# Patient Record
Sex: MALE | Race: BLACK OR AFRICAN AMERICAN | NOT HISPANIC OR LATINO | Employment: OTHER | URBAN - METROPOLITAN AREA
[De-identification: names, ages, dates, MRNs, and addresses within clinical notes are randomized per-mention and may not be internally consistent; named-entity substitution may affect disease eponyms.]

---

## 2020-10-08 ENCOUNTER — APPOINTMENT (OUTPATIENT)
Dept: GENERAL RADIOLOGY | Facility: CLINIC | Age: 65
End: 2020-10-08
Attending: EMERGENCY MEDICINE
Payer: COMMERCIAL

## 2020-10-08 ENCOUNTER — HOSPITAL ENCOUNTER (EMERGENCY)
Facility: CLINIC | Age: 65
Discharge: HOME | End: 2020-10-08
Attending: EMERGENCY MEDICINE | Admitting: EMERGENCY MEDICINE
Payer: COMMERCIAL

## 2020-10-08 VITALS
HEART RATE: 57 BPM | DIASTOLIC BLOOD PRESSURE: 96 MMHG | TEMPERATURE: 98.1 F | RESPIRATION RATE: 16 BRPM | SYSTOLIC BLOOD PRESSURE: 135 MMHG | OXYGEN SATURATION: 96 %

## 2020-10-08 DIAGNOSIS — R07.89 OTHER CHEST PAIN: ICD-10-CM

## 2020-10-08 DIAGNOSIS — M54.12 CERVICAL RADICULOPATHY: ICD-10-CM

## 2020-10-08 LAB
ALBUMIN SERPL-MCNC: 4.1 G/DL (ref 3.4–5)
ALBUMIN UR-MCNC: NEGATIVE MG/DL
ALP SERPL-CCNC: 91 U/L (ref 40–150)
ALT SERPL W P-5'-P-CCNC: 32 U/L (ref 0–70)
ANION GAP SERPL CALCULATED.3IONS-SCNC: <1 MMOL/L (ref 3–14)
APPEARANCE UR: CLEAR
AST SERPL W P-5'-P-CCNC: 25 U/L (ref 0–45)
BASOPHILS # BLD AUTO: 0 10E9/L (ref 0–0.2)
BASOPHILS NFR BLD AUTO: 0.5 %
BILIRUB SERPL-MCNC: 0.5 MG/DL (ref 0.2–1.3)
BILIRUB UR QL STRIP: NEGATIVE
BUN SERPL-MCNC: 23 MG/DL (ref 7–30)
CALCIUM SERPL-MCNC: 9 MG/DL (ref 8.5–10.1)
CHLORIDE SERPL-SCNC: 107 MMOL/L (ref 94–109)
CO2 SERPL-SCNC: 32 MMOL/L (ref 20–32)
COLOR UR AUTO: YELLOW
CREAT SERPL-MCNC: 1.14 MG/DL (ref 0.66–1.25)
D DIMER PPP FEU-MCNC: 0.4 UG/ML FEU (ref 0–0.5)
DIFFERENTIAL METHOD BLD: NORMAL
EOSINOPHIL # BLD AUTO: 0.2 10E9/L (ref 0–0.7)
EOSINOPHIL NFR BLD AUTO: 2.2 %
ERYTHROCYTE [DISTWIDTH] IN BLOOD BY AUTOMATED COUNT: 14.4 % (ref 10–15)
GFR SERPL CREATININE-BSD FRML MDRD: 68 ML/MIN/{1.73_M2}
GLUCOSE SERPL-MCNC: 98 MG/DL (ref 70–99)
GLUCOSE UR STRIP-MCNC: NEGATIVE MG/DL
HCT VFR BLD AUTO: 50.1 % (ref 40–53)
HGB BLD-MCNC: 16.1 G/DL (ref 13.3–17.7)
HGB UR QL STRIP: NEGATIVE
IMM GRANULOCYTES # BLD: 0 10E9/L (ref 0–0.4)
IMM GRANULOCYTES NFR BLD: 0.2 %
INTERPRETATION ECG - MUSE: NORMAL
KETONES UR STRIP-MCNC: NEGATIVE MG/DL
LEUKOCYTE ESTERASE UR QL STRIP: NEGATIVE
LYMPHOCYTES # BLD AUTO: 2.5 10E9/L (ref 0.8–5.3)
LYMPHOCYTES NFR BLD AUTO: 29.9 %
MCH RBC QN AUTO: 27.6 PG (ref 26.5–33)
MCHC RBC AUTO-ENTMCNC: 32.1 G/DL (ref 31.5–36.5)
MCV RBC AUTO: 86 FL (ref 78–100)
MONOCYTES # BLD AUTO: 0.6 10E9/L (ref 0–1.3)
MONOCYTES NFR BLD AUTO: 7.3 %
NEUTROPHILS # BLD AUTO: 5 10E9/L (ref 1.6–8.3)
NEUTROPHILS NFR BLD AUTO: 59.9 %
NITRATE UR QL: NEGATIVE
NRBC # BLD AUTO: 0 10*3/UL
NRBC BLD AUTO-RTO: 0 /100
PH UR STRIP: 6.5 PH (ref 5–7)
PLATELET # BLD AUTO: 181 10E9/L (ref 150–450)
POTASSIUM SERPL-SCNC: 3.4 MMOL/L (ref 3.4–5.3)
PROT SERPL-MCNC: 7.7 G/DL (ref 6.8–8.8)
RBC # BLD AUTO: 5.84 10E12/L (ref 4.4–5.9)
SODIUM SERPL-SCNC: 139 MMOL/L (ref 133–144)
SOURCE: ABNORMAL
SP GR UR STRIP: 1.03 (ref 1–1.03)
TROPONIN I BLD-MCNC: 0.01 UG/L (ref 0–0.08)
UROBILINOGEN UR STRIP-MCNC: 4 MG/DL (ref 0–2)
WBC # BLD AUTO: 8.4 10E9/L (ref 4–11)

## 2020-10-08 PROCEDURE — 85025 COMPLETE CBC W/AUTO DIFF WBC: CPT | Performed by: EMERGENCY MEDICINE

## 2020-10-08 PROCEDURE — 81003 URINALYSIS AUTO W/O SCOPE: CPT | Performed by: EMERGENCY MEDICINE

## 2020-10-08 PROCEDURE — 80053 COMPREHEN METABOLIC PANEL: CPT | Performed by: EMERGENCY MEDICINE

## 2020-10-08 PROCEDURE — 99285 EMERGENCY DEPT VISIT HI MDM: CPT | Mod: 25 | Performed by: EMERGENCY MEDICINE

## 2020-10-08 PROCEDURE — 93010 ELECTROCARDIOGRAM REPORT: CPT | Performed by: EMERGENCY MEDICINE

## 2020-10-08 PROCEDURE — 85379 FIBRIN DEGRADATION QUANT: CPT | Performed by: EMERGENCY MEDICINE

## 2020-10-08 PROCEDURE — 71046 X-RAY EXAM CHEST 2 VIEWS: CPT

## 2020-10-08 PROCEDURE — 93005 ELECTROCARDIOGRAM TRACING: CPT | Performed by: EMERGENCY MEDICINE

## 2020-10-08 PROCEDURE — 99284 EMERGENCY DEPT VISIT MOD MDM: CPT | Mod: 25 | Performed by: EMERGENCY MEDICINE

## 2020-10-08 PROCEDURE — 84484 ASSAY OF TROPONIN QUANT: CPT

## 2020-10-08 RX ORDER — ATORVASTATIN CALCIUM 20 MG/1
20 TABLET, FILM COATED ORAL DAILY
COMMUNITY
End: 2022-12-06

## 2020-10-08 RX ORDER — METHYLPREDNISOLONE 4 MG
TABLET, DOSE PACK ORAL
Qty: 21 TABLET | Refills: 0 | Status: SHIPPED | OUTPATIENT
Start: 2020-10-08 | End: 2022-08-01

## 2020-10-08 RX ORDER — ASPIRIN 81 MG/1
81 TABLET ORAL PRN
Status: ON HOLD | COMMUNITY
End: 2023-03-08

## 2020-10-08 RX ORDER — NAPROXEN 25 MG/ML
SUSPENSION ORAL 2 TIMES DAILY
Status: ON HOLD | COMMUNITY
End: 2023-03-09

## 2020-10-08 ASSESSMENT — ENCOUNTER SYMPTOMS
SHORTNESS OF BREATH: 0
COLOR CHANGE: 0
NECK STIFFNESS: 0
DIFFICULTY URINATING: 0
ARTHRALGIAS: 0
CONFUSION: 0
HEADACHES: 0
ABDOMINAL PAIN: 0
EYE REDNESS: 0
FEVER: 0

## 2020-10-08 NOTE — ED TRIAGE NOTES
"Patient reports that he has been having pain in the right arm for over a month. Patient reports that he has been trying to get an \"online apt\" with no luck. The pain was finally good enough today so that he could make it in today. Patient reports taking advil daily to help with the pain.  "

## 2020-10-08 NOTE — ED AVS SNAPSHOT
[facility]  [address]  [place] [address]  Phone: [phone]  Fax: [phone]                                    Edin Robles   MRN: [id number]    Department: [facility]   Date of Visit: 10/8/2020           After Visit Summary Signature Page    I have received my discharge instructions, and my questions have been answered. I have discussed any challenges I see with this plan with the nurse or doctor.    ..........................................................................................................................................  Patient/Patient Representative Signature      ..........................................................................................................................................  Patient Representative Print Name and Relationship to Patient    ..................................................               ................................................  Date                                   Time    ..........................................................................................................................................  Reviewed by Signature/Title    ...................................................              ..............................................  Date                                               Time          22EPIC Rev 08/18

## 2022-08-01 ENCOUNTER — OFFICE VISIT (OUTPATIENT)
Dept: FAMILY MEDICINE | Facility: CLINIC | Age: 67
End: 2022-08-01
Payer: COMMERCIAL

## 2022-08-01 VITALS
HEIGHT: 72 IN | RESPIRATION RATE: 20 BRPM | HEART RATE: 65 BPM | OXYGEN SATURATION: 96 % | TEMPERATURE: 97.3 F | WEIGHT: 204.1 LBS | SYSTOLIC BLOOD PRESSURE: 106 MMHG | BODY MASS INDEX: 27.64 KG/M2 | DIASTOLIC BLOOD PRESSURE: 73 MMHG

## 2022-08-01 DIAGNOSIS — F33.1 MAJOR DEPRESSIVE DISORDER, RECURRENT EPISODE, MODERATE (H): ICD-10-CM

## 2022-08-01 DIAGNOSIS — E78.2 MIXED HYPERLIPIDEMIA: ICD-10-CM

## 2022-08-01 DIAGNOSIS — B35.3 TINEA PEDIS OF LEFT FOOT: ICD-10-CM

## 2022-08-01 DIAGNOSIS — I10 PRIMARY HYPERTENSION: ICD-10-CM

## 2022-08-01 DIAGNOSIS — R79.89 LOW TSH LEVEL: ICD-10-CM

## 2022-08-01 DIAGNOSIS — L73.9 ACUTE FOLLICULITIS: Primary | ICD-10-CM

## 2022-08-01 PROCEDURE — 99204 OFFICE O/P NEW MOD 45 MIN: CPT | Performed by: FAMILY MEDICINE

## 2022-08-01 RX ORDER — CITALOPRAM HYDROBROMIDE 20 MG/1
20 TABLET ORAL
COMMUNITY
End: 2022-08-18

## 2022-08-01 RX ORDER — KETOCONAZOLE 20 MG/G
CREAM TOPICAL DAILY
Qty: 30 G | Refills: 1 | Status: ON HOLD | OUTPATIENT
Start: 2022-08-01 | End: 2023-03-08

## 2022-08-01 RX ORDER — HYDROCHLOROTHIAZIDE 25 MG/1
25 TABLET ORAL
COMMUNITY
End: 2022-12-06

## 2022-08-01 RX ORDER — AMLODIPINE BESYLATE 10 MG/1
10 TABLET ORAL DAILY
COMMUNITY
Start: 2021-09-14 | End: 2022-12-06

## 2022-08-01 ASSESSMENT — ANXIETY QUESTIONNAIRES
2. NOT BEING ABLE TO STOP OR CONTROL WORRYING: SEVERAL DAYS
GAD7 TOTAL SCORE: 6
7. FEELING AFRAID AS IF SOMETHING AWFUL MIGHT HAPPEN: NOT AT ALL
1. FEELING NERVOUS, ANXIOUS, OR ON EDGE: NOT AT ALL
5. BEING SO RESTLESS THAT IT IS HARD TO SIT STILL: SEVERAL DAYS
IF YOU CHECKED OFF ANY PROBLEMS ON THIS QUESTIONNAIRE, HOW DIFFICULT HAVE THESE PROBLEMS MADE IT FOR YOU TO DO YOUR WORK, TAKE CARE OF THINGS AT HOME, OR GET ALONG WITH OTHER PEOPLE: SOMEWHAT DIFFICULT
GAD7 TOTAL SCORE: 6
6. BECOMING EASILY ANNOYED OR IRRITABLE: SEVERAL DAYS
3. WORRYING TOO MUCH ABOUT DIFFERENT THINGS: SEVERAL DAYS

## 2022-08-01 ASSESSMENT — PATIENT HEALTH QUESTIONNAIRE - PHQ9
5. POOR APPETITE OR OVEREATING: MORE THAN HALF THE DAYS
SUM OF ALL RESPONSES TO PHQ QUESTIONS 1-9: 12

## 2022-08-01 ASSESSMENT — PAIN SCALES - GENERAL: PAINLEVEL: EXTREME PAIN (9)

## 2022-08-01 NOTE — COMMUNITY RESOURCES LIST (ENGLISH)
08/01/2022   [facility] Outpatient Clinics  N/A  For questions about this resource list or additional care needs, please contact your primary care clinic or care manager.  Phone: [phone]   Email: N/A   Address: [address] [place] [address]   Hours: N/A        Hotlines and Helplines       Hotline - Crisis help  1  National Human Trafficking Hotline Distance: 2.3 miles      COVID-19 Status: Phone/Virtual   [address] [place] [address]  Language: English  Hours: Mon - Sun Open 24 Hours   Phone: (211) 533-8814 Website: https://[employer]/     2  Pregnancy and Postpartum Support [place] - Pregnancy and Postpartum Distance: 2.3 miles      COVID-19 Status: Phone/Virtual   [address] [place] [address]  Language: English  Hours: Mon - Fri 9:00 AM - 5:00 PM   Phone: (629) 369-7997 Email: winston@"[employer].com Website: http://www.[facility] Center[facility]mn.org          Mental Health       Individual counseling  3  [facility] Distance: 0.74 miles      COVID-19 Status: Phone/Virtual   [address] [place] [address]  Language: English, Hong Konger  Hours: Mon 8:00 AM - 5:00 PM , Tue 8:00 AM - 7:00 PM , Wed - Fri 8:00 AM - 5:00 PM  Fees: Insurance, Self Pay, Sliding Fee   Phone: (143) 716-6180 Email: [email or web address] Website: https://www.[facility].org/index.php/[facility] [place] [facility]     4  [facility] Distance: 0.99 miles      COVID-19 Status: Regular Operations   [address] [place] [address]  Language: English  Hours: Mon - Thu 8:00 AM - 5:00 PM , Fri 8:00 AM - 4:00 PM  Fees: Insurance, Self Pay, Sliding Fee   Phone: (502) 443-5413 Email: info@[employer] Website: http://www.[employer]/index.php     Mental health crisis care  5  [place] - Children's Crisis Response Services Distance: 2.53 miles      COVID-19 Status: Regular Operations,  COVID-19 Status: Phone/Virtual   [address] [place] [address]  Language: English, Hmong, Burmese, Lithuanian  Hours: Mon - Sun Open 24 Hours  Fees: Free, Insurance   Phone: (467) 274-3385 Website: http://www.[facility]/health-medical/childrens-mental-health-services#child-crisis-services     6  Community Outreach for Psychiatric Emergencies (COPE) Distance: 2.53 miles      COVID-19 Status: Regular Operations, COVID-19 Status: Phone/Virtual   [address] [place] [address]  Language: English, Burmese, Lithuanian  Hours: Mon - Sun Open 24 Hours  Fees: Free   Phone: (617) 425-4566 Email: [facility].[email or web address] Website: http://www.[facility]/emergencies/mental-health-emergencies     Mental health support group  7  [facility] Distance: 1.84 miles      COVID-19 Status: Regular Operations, COVID-19 Status: Phone/Virtual   [address] [place] [address]  Language: English, Tim, Serbian  Hours: Mon - Fri 9:00 AM - 5:00 PM  Fees: Free   Phone: (937) 928-7594 Email: mecca@[employer] Website: https://www.[employer]/     8  Pregnancy & Postpartum Support [facility] Distance: 2.3 miles      COVID-19 Status: Regular Operations   [address] [place] [address]  Language: English  Hours: Tue 7:30 PM - 9:30 PM  Fees: Free   Phone: (619) 341-7625 Email: kristy@[employer] Website: http://www.[facility] Hospital[facility]ortmn.org/multiples          Important Numbers & Websites       Emergency Services   911  City Services   311  Poison Control   (355) 596-6133  Suicide Prevention Lifeline   (305) 712-5488 (TALK)  Child Abuse Hotline   (989) 334-7724 (4-A-Child)  Sexual Assault Hotline   (390) 201-1835 (HOPE)  National Runaway Safeline   (714) 111-9267 (RUNAWAY)  All-Options Talkline   (696) 333-9542  Substance Abuse Referral   (737) 485-9807 (HELP)

## 2022-08-01 NOTE — PROGRESS NOTES
{PROVIDER CHARTING PREFERENCE:640946}    Subjective   Edin is a 66 year old{ACCOMPANIED BY STATEMENT (Optional):789982}, presenting for the following health issues:  Establish Care      History of Present Illness       Reason for visit:  Lump under throat, chronic fatigue, back pain  Symptom onset:  1-2 weeks ago  Symptom intensity:  Moderate  Symptom progression:  Worsening  Had these symptoms before:  No  What makes it worse:  Heat  What makes it better:  Cool temps    He eats 2-3 servings of fruits and vegetables daily.He consumes 2 sweetened beverage(s) daily.He exercises with enough effort to increase his heart rate 60 or more minutes per day.  He exercises with enough effort to increase his heart rate 7 days per week.   He is taking medications regularly.       {SUPERLIST (Optional):239617}  {additonal problems for provider to add (Optional):054208}    Review of Systems   {ROS COMP (Optional):435069}      Objective    /73   Pulse 65   Temp 97.3  F (36.3  C) (Tympanic)   Resp 20   Ht 1.829 m (6')   Wt 92.6 kg (204 lb 1.6 oz)   SpO2 96%   BMI 27.68 kg/m    Body mass index is 27.68 kg/m .  Physical Exam   {Exam List (Optional):244998}    {Diagnostic Test Results (Optional):652341}    {AMBULATORY ATTESTATION (Optional):496633}            .  ..

## 2022-08-01 NOTE — PATIENT INSTRUCTIONS
For the lump under chin - use warm compresses twice a day to three times a day   Begin Augmentin 875 mg twice a day for 10 days.    For the shoulder/back pain symptoms. I will review the [facility] chart regarding the previous evaluation and the area that you need recheck of the growth in lung.      Begin nizoral cream to the left toe area for fungal infection.  If not effective, notify me.

## 2022-08-01 NOTE — PROGRESS NOTES
Assessment & Plan     Acute folliculitis  Submandibular area - not abscess like at this time.  Warm compresses, antibiotics.  Consider I&D if worsening.  Begin antibiotics as noted.  Follow up if fails to resolve.    - amoxicillin-clavulanate (AUGMENTIN) 875-125 MG tablet; Take 1 tablet by mouth 2 times daily    Tinea pedis of left foot  Topical treatment for athletes foot as noted.    - ketoconazole (NIZORAL) 2 % external cream; Apply topically daily    Major depressive disorder, recurrent episode, moderate (H)  Taking citalopram as previous.      Mixed hyperlipidemia  Taking lipitor as previous.   Labs due.  Orders placed for future labs.    - Lipid panel reflex to direct LDL Non-fasting; Future    Primary hypertension  BP well controlled with combo of amlodipine and hydrochlorothiazide.  Labs planned.    - Comprehensive metabolic panel (BMP + Alb, Alk Phos, ALT, AST, Total. Bili, TP); Future  - Albumin Random Urine Quantitative with Creat Ratio; Future    Low TSH level  On previous testing in October 2020.  Follow up planned   - TSH with free T4 reflex; Future    Review of prior external note(s) from - CareEverywhere information from [facility] [employer]  reviewed  Review of the result(s) of each unique test - TSH, Lipid, CMP,   Ordering of each unique test  Prescription drug management         BMI:   Estimated body mass index is 27.68 kg/m  as calculated from the following:    Height as of this encounter: 1.829 m (6').    Weight as of this encounter: 92.6 kg (204 lb 1.6 oz).   Weight management plan: Discussed healthy diet and exercise guidelines    Depression Screening Follow Up    PHQ 8/1/2022   PHQ-9 Total Score 12   Q9: Thoughts of better off dead/self-harm past 2 weeks Not at all         Follow Up Actions Taken  Crisis resource information provided in After Visit Summary     Patient Instructions   For the lump under chin - use warm compresses twice a day to three times a day   Begin Augmentin 875 mg twice a  day for 10 days.    For the shoulder/back pain symptoms. I will review the [facility] chart regarding the previous evaluation and the area that you need recheck of the growth in lung.      Begin nizoral cream to the left toe area for fungal infection.  If not effective, notify me.            Return in about 3 months (around 11/1/2022).    Jessica Small MD  [facility] KRYSTIAN Jesus is a 66 year old presenting for the following health issues:  Establish Care      History of Present Illness       Reason for visit:  Lump under throat, chronic fatigue, back pain  Symptom onset:  1-2 weeks ago  Symptom intensity:  Moderate  Symptom progression:  Worsening  Had these symptoms before:  No  What makes it worse:  Heat  What makes it better:  Cool temps    He eats 2-3 servings of fruits and vegetables daily.He consumes 2 sweetened beverage(s) daily.He exercises with enough effort to increase his heart rate 60 or more minutes per day.  He exercises with enough effort to increase his heart rate 7 days per week.   He is taking medications regularly.     Taking herbal supplement for pain symptoms.  Aspirin, aleve, ibuprofen without benefit.  Had 3rd booster recently.    Lump under chin  - tender to touch.  No drainage.  No sore throat or diffiuclty swallowing.  No fever or systemic symptoms.    Works 12 hour days.  Back pain - Back pain below shoulder blade - cramping, stabbing pain - pinched nerve like.      Hyperhidrosis face.      Fluid intake ok         Hyperlipidemia Follow-Up      Are you regularly taking any medication or supplement to lower your cholesterol?   Yes- Lipitor    Are you having muscle aches or other side effects that you think could be caused by your cholesterol lowering medication?  No    Hypertension Follow-up      Do you check your blood pressure regularly outside of the clinic? No     Are you following a low salt diet? No    Are your blood pressures ever more than 140 on  the top number (systolic) OR more   than 90 on the bottom number (diastolic), for example 140/90? No      Depression Followup    How are you doing with your depression since your last visit? No change    Are you having other symptoms that might be associated with depression? No    Have you had a significant life event?  No     Are you feeling anxious or having panic attacks?   No    Do you have any concerns with your use of alcohol or other drugs? No    Social History     Tobacco Use     Smoking status: Former Smoker     Quit date: 2021     Years since quittin.0     Smokeless tobacco: Never Used   Vaping Use     Vaping Use: Never used   Substance Use Topics     Alcohol use: Yes     Comment: beer per day, on average     Drug use: Not Currently     PHQ 2022   PHQ-9 Total Score 12   Q9: Thoughts of better off dead/self-harm past 2 weeks Not at all     LUTHER-7 SCORE 2022   Total Score 6         Suicide Assessment Five-step Evaluation and Treatment (SAFE-T)        Review of Systems   Constitutional, HEENT, cardiovascular, pulmonary, gi and gu systems are negative, except as otherwise noted.      Objective    /73   Pulse 65   Temp 97.3  F (36.3  C) (Tympanic)   Resp 20   Ht 1.829 m (6')   Wt 92.6 kg (204 lb 1.6 oz)   SpO2 96%   BMI 27.68 kg/m    Body mass index is 27.68 kg/m .  Physical Exam   GENERAL: alert and no distress  HENT: normal cephalic/atraumatic, ear canals and TM's normal, nose and mouth without ulcers or lesions, oropharynx clear and oral mucous membranes moist.  Submandibular area  in beard with 3 cm raised erythematous indurated area.  No fluctuance, no drainage or open areas noted.  No warmth touch.  Mildly tender.    NECK: no adenopathy, no asymmetry, masses, or scars and thyroid normal to palpation  RESP: lungs clear to auscultation - no rales, rhonchi or wheezes  CV: regular rate and rhythm, normal S1 S2, no S3 or S4, no murmur, click or rub, no peripheral edema and  peripheral pulses strong  MS: tenderness to palpation along the medial aspect of the right scapula.  FROM of BUE.    SKIN:   Left foot between the 4th and 5th toes with macerated area.  Mild erythema noted at site wtihout extension to surrounding tissues.    NEURO: Normal strength and tone, mentation intact and speech normal  PSYCH: mentation appears normal, affect normal/bright                    .  ..

## 2022-08-02 PROBLEM — F33.1 MAJOR DEPRESSIVE DISORDER, RECURRENT EPISODE, MODERATE (H): Status: ACTIVE | Noted: 2022-08-02

## 2022-08-07 ENCOUNTER — TELEPHONE (OUTPATIENT)
Dept: FAMILY MEDICINE | Facility: CLINIC | Age: 67
End: 2022-08-07

## 2022-08-08 NOTE — TELEPHONE ENCOUNTER
Called home phone-unable to LVM. Called pts cell phone and LVM. Let him know that Dr. Small was able to get records from [facility] and that she is requesting he make a f/u visit in the next 2 weeks to discuss.

## 2022-08-18 ENCOUNTER — TELEPHONE (OUTPATIENT)
Dept: FAMILY MEDICINE | Facility: CLINIC | Age: 67
End: 2022-08-18

## 2022-08-18 ENCOUNTER — VIRTUAL VISIT (OUTPATIENT)
Dept: FAMILY MEDICINE | Facility: CLINIC | Age: 67
End: 2022-08-18
Payer: COMMERCIAL

## 2022-08-18 DIAGNOSIS — E78.5 HYPERLIPIDEMIA, UNSPECIFIED HYPERLIPIDEMIA TYPE: ICD-10-CM

## 2022-08-18 DIAGNOSIS — G89.29 CHRONIC THORACIC BACK PAIN, UNSPECIFIED BACK PAIN LATERALITY: Primary | ICD-10-CM

## 2022-08-18 DIAGNOSIS — M54.6 CHRONIC THORACIC BACK PAIN, UNSPECIFIED BACK PAIN LATERALITY: Primary | ICD-10-CM

## 2022-08-18 DIAGNOSIS — F33.1 MAJOR DEPRESSIVE DISORDER, RECURRENT EPISODE, MODERATE (H): ICD-10-CM

## 2022-08-18 DIAGNOSIS — R79.89 LOW TSH LEVEL: ICD-10-CM

## 2022-08-18 DIAGNOSIS — I10 PRIMARY HYPERTENSION: ICD-10-CM

## 2022-08-18 DIAGNOSIS — L73.9 ACUTE FOLLICULITIS: ICD-10-CM

## 2022-08-18 PROCEDURE — 99214 OFFICE O/P EST MOD 30 MIN: CPT | Mod: 95 | Performed by: FAMILY MEDICINE

## 2022-08-18 RX ORDER — LIDOCAINE 50 MG/G
1 PATCH TOPICAL EVERY 24 HOURS
Qty: 30 PATCH | Refills: 0 | Status: ON HOLD | OUTPATIENT
Start: 2022-08-18 | End: 2023-03-08

## 2022-08-18 RX ORDER — CITALOPRAM HYDROBROMIDE 20 MG/1
20 TABLET ORAL DAILY
Qty: 30 TABLET | Refills: 3 | Status: ON HOLD | OUTPATIENT
Start: 2022-08-18 | End: 2023-03-08

## 2022-08-18 NOTE — PROGRESS NOTES
Edin is a 66 year old who is being evaluated via a billable video visit.      How would you like to obtain your AVS? MyChart  If the video visit is dropped, the invitation should be resent by: Text to cell phone: [phone]  Will anyone else be joining your video visit? No        Assessment & Plan     Chronic thoracic back pain, unspecified back pain laterality  Ongoing upper back pain.  He has used topical IcyHot or lidocaine in the past.  He is never used it preventatively before going to work and work is where his symptoms will occur.  I am sending a prescription for lidocaine patches in for him now and he should put it on in the morning prior to going to work it would be good for 12 hours and he can remove it at bedtime.  He will follow-up if this fails to control the symptoms.  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  - lidocaine (LIDODERM) 5 % patch; Place 1 patch onto the skin every 24 hours To prevent lidocaine toxicity, patient should be patch free for 12 hrs daily.    Major depressive disorder, recurrent episode, moderate (H)  Ongoing use of citalopram refills given.  - citalopram (CELEXA) 20 MG tablet; Take 1 tablet (20 mg) by mouth daily    Acute folliculitis  Complete antibiotic course and follow-up if fails to completely resolve    Primary hypertension  Continue amlodipine as previous for blood pressure control    Low TSH level  Return to clinic for lab including TSH evaluation    Hyperlipidemia, unspecified hyperlipidemia type  Return to lipid clinic for lipids and continue atorvastatin in the interim      Review of prior external note(s) from - CareEverywhere information from [facility]  reviewed  Review of the result(s) of each unique test - TSH, lipid  Prescription drug management         Patient Instructions   Return to clinic for labs.    Resume the citalopram - follow up in 4-6 weeks for recheck mood if symptoms fail to resolve.    Begin trial of  Lidocaine  patches once daily prior to work to see if that prevents the back pain symptoms.  Let me know if the medication is not covered by insurance.            Return in about 1 week (around 8/25/2022) for Lab Work.    Jessica Small MD  [facility] KRYSTIAN Jesus is a 66 year old presenting for the following health issues:    Hypertension and Lipids      History of Present Illness       Back Pain:  He presents for follow up of back pain. Patient's back pain is a chronic problem.  Location of back pain:  Left upper back  Description of back pain: sharp  Back pain spreads: left shoulder    Since patient first noticed back pain, pain is: always present, but gets better and worse  Does back pain interfere with his job:  Yes      He eats 2-3 servings of fruits and vegetables daily.He consumes 2 sweetened beverage(s) daily.He exercises with enough effort to increase his heart rate 60 or more minutes per day.  He exercises with enough effort to increase his heart rate 3 or less days per week.   He is taking medications regularly.   thoracic back pain:  Severe pain can happen at times.  Icy hot can help symptoms.    Sharp pain - stabby sensation.      Hyperlipidemia Follow-Up      Are you regularly taking any medication or supplement to lower your cholesterol?   Yes- Atorvastatin    Are you having muscle aches or other side effects that you think could be caused by your cholesterol lowering medication?  No    Hypertension Follow-up      Do you check your blood pressure regularly outside of the clinic? Yes     Are you following a low salt diet? Yes    Are your blood pressures ever more than 140 on the top number (systolic) OR more   than 90 on the bottom number (diastolic), for example 140/90? No      Depression and Anxiety Follow-Up    How are you doing with your depression since your last visit? No change    How are you doing with your anxiety since your last visit?  No change    Are  you having other symptoms that might be associated with depression or anxiety? No    Have you had a significant life event? No     Do you have any concerns with your use of alcohol or other drugs? No    Social History     Tobacco Use     Smoking status: Former Smoker     Quit date: 2021     Years since quittin.0     Smokeless tobacco: Never Used   Vaping Use     Vaping Use: Never used   Substance Use Topics     Alcohol use: Yes     Comment: beer per day, on average     Drug use: Not Currently     PHQ 2022   PHQ-9 Total Score 12   Q9: Thoughts of better off dead/self-harm past 2 weeks Not at all     LUTHER-7 SCORE 2022   Total Score 6         Suicide Assessment Five-step Evaluation and Treatment (SAFE-T)      Low TSH: Review of his past records reveals a low TSH with normal free T4 in 2020.  He does have future lab orders available but has not yet completed those to reassess thyroid function.    Acute folliculitis: The submandibular area is much improved although there continues to be a small apparent cystic or follicular area that is viewable on the video.  Reports he has not yet finished his antibiotics but will in a few days.  He says it is continuing to improve and is no longer causing pain.          Review of Systems   Constitutional, HEENT, cardiovascular, pulmonary, gi and gu systems are negative, except as otherwise noted.      Objective           Vitals:  No vitals were obtained today due to virtual visit.    Physical Exam   GENERAL: alert and no distress  EYES: Eyes grossly normal to inspection.  No discharge or erythema, or obvious scleral/conjunctival abnormalities.  HENT: Normal cephalic/atraumatic.  External ears, nose and mouth without ulcers or lesions.  No nasal drainage visible.  RESP: No audible wheeze, cough, or visible cyanosis.  No visible retractions or increased work of breathing.    SKIN: Visible skin clear. No significant rash, abnormal pigmentation or lesions.  SKIN: no  suspicious lesions or rashes and apparent cystic or follicular area in the submandibular space.  No drainage is noted no significant erythema is appreciated on the video.  Is much improved compared with his previous visit.  NEURO: Cranial nerves grossly intact.  Mentation and speech appropriate for age.  PSYCH: Mentation appears normal, affect normal/bright, judgement and insight intact, normal speech and appearance well-groomed.                Video-Visit Details    Video Start Time: 3:08 PM    Type of service:  Video Visit    Video End Time:3:25 PM    Originating Location (pt. Location): Home    Distant Location (provider location):  [facility]     Platform used for Video Visit: Fyber    .  ..       This chart was documented by provider using a voice activated software called Dragon in addition to manual typing. There may be vocabulary errors or other grammatical errors due to this.

## 2022-08-18 NOTE — PATIENT INSTRUCTIONS
Return to clinic for labs.    Resume the citalopram - follow up in 4-6 weeks for recheck mood if symptoms fail to resolve.    Begin trial of Lidocaine  patches once daily prior to work to see if that prevents the back pain symptoms.  Let me know if the medication is not covered by insurance.

## 2022-08-18 NOTE — TELEPHONE ENCOUNTER
Attempted to call patient to check in prior to appointment this afternoon.     No answer. LVM at 10:43.     Amee Whittington RN  08/18/22

## 2022-08-18 NOTE — TELEPHONE ENCOUNTER
Second attempt to contact patient prior to appointment this afternoon.    No answer. LVM at 2:30.    Amee Whittington RN  08/18/22

## 2022-08-21 PROBLEM — E78.5 HYPERLIPIDEMIA, UNSPECIFIED HYPERLIPIDEMIA TYPE: Status: ACTIVE | Noted: 2022-08-21

## 2022-08-21 PROBLEM — R79.89 LOW TSH LEVEL: Status: ACTIVE | Noted: 2022-08-21

## 2022-08-21 PROBLEM — G89.29 CHRONIC THORACIC BACK PAIN, UNSPECIFIED BACK PAIN LATERALITY: Status: ACTIVE | Noted: 2022-08-21

## 2022-08-21 PROBLEM — I10 PRIMARY HYPERTENSION: Status: ACTIVE | Noted: 2022-08-21

## 2022-08-21 PROBLEM — M54.6 CHRONIC THORACIC BACK PAIN, UNSPECIFIED BACK PAIN LATERALITY: Status: ACTIVE | Noted: 2022-08-21

## 2022-08-25 ENCOUNTER — LAB (OUTPATIENT)
Dept: LAB | Facility: CLINIC | Age: 67
End: 2022-08-25
Payer: COMMERCIAL

## 2022-08-25 DIAGNOSIS — I10 PRIMARY HYPERTENSION: ICD-10-CM

## 2022-08-25 DIAGNOSIS — E78.2 MIXED HYPERLIPIDEMIA: ICD-10-CM

## 2022-08-25 DIAGNOSIS — L73.9 ACUTE FOLLICULITIS: ICD-10-CM

## 2022-08-25 DIAGNOSIS — R79.89 LOW TSH LEVEL: ICD-10-CM

## 2022-08-25 LAB
ALBUMIN SERPL-MCNC: 4 G/DL (ref 3.4–5)
ALP SERPL-CCNC: 72 U/L (ref 40–150)
ALT SERPL W P-5'-P-CCNC: 28 U/L (ref 0–70)
ANION GAP SERPL CALCULATED.3IONS-SCNC: 4 MMOL/L (ref 3–14)
AST SERPL W P-5'-P-CCNC: 21 U/L (ref 0–45)
BILIRUB SERPL-MCNC: 0.8 MG/DL (ref 0.2–1.3)
BUN SERPL-MCNC: 19 MG/DL (ref 7–30)
CALCIUM SERPL-MCNC: 9.5 MG/DL (ref 8.5–10.1)
CHLORIDE BLD-SCNC: 111 MMOL/L (ref 94–109)
CHOLEST SERPL-MCNC: 134 MG/DL
CO2 SERPL-SCNC: 27 MMOL/L (ref 20–32)
CREAT SERPL-MCNC: 1.1 MG/DL (ref 0.66–1.25)
CREAT UR-MCNC: 371 MG/DL
ERYTHROCYTE [DISTWIDTH] IN BLOOD BY AUTOMATED COUNT: 14.3 % (ref 10–15)
FASTING STATUS PATIENT QL REPORTED: YES
GFR SERPL CREATININE-BSD FRML MDRD: 75 ML/MIN/1.73M2
GLUCOSE BLD-MCNC: 101 MG/DL (ref 70–99)
HCT VFR BLD AUTO: 46.6 % (ref 40–53)
HDLC SERPL-MCNC: 66 MG/DL
HGB BLD-MCNC: 15 G/DL (ref 13.3–17.7)
LDLC SERPL CALC-MCNC: 55 MG/DL
MCH RBC QN AUTO: 28.1 PG (ref 26.5–33)
MCHC RBC AUTO-ENTMCNC: 32.2 G/DL (ref 31.5–36.5)
MCV RBC AUTO: 87 FL (ref 78–100)
MICROALBUMIN UR-MCNC: 15 MG/L
MICROALBUMIN/CREAT UR: 4.04 MG/G CR (ref 0–17)
NONHDLC SERPL-MCNC: 68 MG/DL
PLATELET # BLD AUTO: 210 10E3/UL (ref 150–450)
POTASSIUM BLD-SCNC: 3.9 MMOL/L (ref 3.4–5.3)
PROT SERPL-MCNC: 7.3 G/DL (ref 6.8–8.8)
RBC # BLD AUTO: 5.34 10E6/UL (ref 4.4–5.9)
SODIUM SERPL-SCNC: 142 MMOL/L (ref 133–144)
T4 FREE SERPL-MCNC: 1.04 NG/DL (ref 0.76–1.46)
TRIGL SERPL-MCNC: 63 MG/DL
TSH SERPL DL<=0.005 MIU/L-ACNC: 0.36 MU/L (ref 0.4–4)
WBC # BLD AUTO: 7.7 10E3/UL (ref 4–11)

## 2022-08-25 PROCEDURE — 80061 LIPID PANEL: CPT

## 2022-08-25 PROCEDURE — 84439 ASSAY OF FREE THYROXINE: CPT

## 2022-08-25 PROCEDURE — 82043 UR ALBUMIN QUANTITATIVE: CPT

## 2022-08-25 PROCEDURE — 85027 COMPLETE CBC AUTOMATED: CPT

## 2022-08-25 PROCEDURE — 36415 COLL VENOUS BLD VENIPUNCTURE: CPT

## 2022-08-25 PROCEDURE — 80053 COMPREHEN METABOLIC PANEL: CPT

## 2022-08-25 PROCEDURE — 84443 ASSAY THYROID STIM HORMONE: CPT

## 2022-08-28 NOTE — RESULT ENCOUNTER NOTE
"Your urine testing is normal.  There is not elevated protein present.  The TSH is borderline low which could indicate you have too much thyroid hormone but the active thyroid hormone is normal.  This is closer to normal than previous testing.   Monitoring of this is recommended yearly.  Your cholesterol levels are well controlled on the current Lipitor doses.  Your blood sugar is borderline elevated.  This is in the \"prediabetic\" range.  Exercise and limiting carbohydrate and sugars in diet can be helpful at preventing progression to diabetes.  Your liver and kidney testing are normal.  Your blood cell counts are normal.  Please call or MyChart message me if you have any questions.      PSK   "

## 2022-09-01 DIAGNOSIS — G89.29 CHRONIC THORACIC BACK PAIN, UNSPECIFIED BACK PAIN LATERALITY: ICD-10-CM

## 2022-09-01 DIAGNOSIS — M54.6 CHRONIC THORACIC BACK PAIN, UNSPECIFIED BACK PAIN LATERALITY: ICD-10-CM

## 2022-09-10 DIAGNOSIS — F33.1 MAJOR DEPRESSIVE DISORDER, RECURRENT EPISODE, MODERATE (H): ICD-10-CM

## 2022-09-12 RX ORDER — CITALOPRAM HYDROBROMIDE 20 MG/1
TABLET ORAL
Qty: 30 TABLET | Refills: 3 | OUTPATIENT
Start: 2022-09-12

## 2022-10-22 ENCOUNTER — HEALTH MAINTENANCE LETTER (OUTPATIENT)
Age: 67
End: 2022-10-22

## 2022-12-05 ENCOUNTER — OFFICE VISIT (OUTPATIENT)
Dept: FAMILY MEDICINE | Facility: CLINIC | Age: 67
End: 2022-12-05
Payer: COMMERCIAL

## 2022-12-05 VITALS
TEMPERATURE: 97.9 F | HEART RATE: 71 BPM | HEIGHT: 72 IN | DIASTOLIC BLOOD PRESSURE: 81 MMHG | RESPIRATION RATE: 18 BRPM | SYSTOLIC BLOOD PRESSURE: 114 MMHG | BODY MASS INDEX: 29.04 KG/M2 | OXYGEN SATURATION: 97 % | WEIGHT: 214.4 LBS

## 2022-12-05 DIAGNOSIS — F33.1 MAJOR DEPRESSIVE DISORDER, RECURRENT EPISODE, MODERATE (H): ICD-10-CM

## 2022-12-05 DIAGNOSIS — E78.5 HYPERLIPIDEMIA, UNSPECIFIED HYPERLIPIDEMIA TYPE: ICD-10-CM

## 2022-12-05 DIAGNOSIS — F14.11 COCAINE ABUSE IN REMISSION (H): ICD-10-CM

## 2022-12-05 DIAGNOSIS — Z12.11 SCREEN FOR COLON CANCER: ICD-10-CM

## 2022-12-05 DIAGNOSIS — Z12.5 SCREENING FOR PROSTATE CANCER: ICD-10-CM

## 2022-12-05 DIAGNOSIS — R79.89 LOW TSH LEVEL: ICD-10-CM

## 2022-12-05 DIAGNOSIS — Z23 NEED FOR PROPHYLACTIC VACCINATION AND INOCULATION AGAINST INFLUENZA: ICD-10-CM

## 2022-12-05 DIAGNOSIS — R53.82 CHRONIC FATIGUE: ICD-10-CM

## 2022-12-05 DIAGNOSIS — R31.29 MICROSCOPIC HEMATURIA: ICD-10-CM

## 2022-12-05 DIAGNOSIS — M77.11 LATERAL EPICONDYLITIS OF RIGHT ELBOW: ICD-10-CM

## 2022-12-05 DIAGNOSIS — Z23 HIGH PRIORITY FOR 2019-NCOV VACCINE: ICD-10-CM

## 2022-12-05 DIAGNOSIS — Z13.6 ENCOUNTER FOR ABDOMINAL AORTIC ANEURYSM (AAA) SCREENING: ICD-10-CM

## 2022-12-05 DIAGNOSIS — Z00.00 ENCOUNTER FOR MEDICARE ANNUAL WELLNESS EXAM: Primary | ICD-10-CM

## 2022-12-05 DIAGNOSIS — R35.0 URINARY FREQUENCY: ICD-10-CM

## 2022-12-05 DIAGNOSIS — I73.00 RAYNAUD'S DISEASE WITHOUT GANGRENE: ICD-10-CM

## 2022-12-05 DIAGNOSIS — B35.3 TINEA PEDIS OF LEFT FOOT: ICD-10-CM

## 2022-12-05 DIAGNOSIS — I10 PRIMARY HYPERTENSION: ICD-10-CM

## 2022-12-05 LAB
ALBUMIN UR-MCNC: NEGATIVE MG/DL
APPEARANCE UR: CLEAR
BACTERIA #/AREA URNS HPF: ABNORMAL /HPF
BILIRUB UR QL STRIP: NEGATIVE
COLOR UR AUTO: YELLOW
ERYTHROCYTE [DISTWIDTH] IN BLOOD BY AUTOMATED COUNT: 14.5 % (ref 10–15)
FERRITIN SERPL-MCNC: 191 NG/ML (ref 26–388)
GLUCOSE UR STRIP-MCNC: NEGATIVE MG/DL
HCT VFR BLD AUTO: 46.7 % (ref 40–53)
HGB BLD-MCNC: 14.9 G/DL (ref 13.3–17.7)
HGB UR QL STRIP: ABNORMAL
KETONES UR STRIP-MCNC: NEGATIVE MG/DL
LEUKOCYTE ESTERASE UR QL STRIP: NEGATIVE
MCH RBC QN AUTO: 28.3 PG (ref 26.5–33)
MCHC RBC AUTO-ENTMCNC: 31.9 G/DL (ref 31.5–36.5)
MCV RBC AUTO: 89 FL (ref 78–100)
NITRATE UR QL: NEGATIVE
PH UR STRIP: 6.5 [PH] (ref 5–7)
PLATELET # BLD AUTO: 224 10E3/UL (ref 150–450)
PSA SERPL-MCNC: 2.35 UG/L (ref 0–4)
RBC # BLD AUTO: 5.27 10E6/UL (ref 4.4–5.9)
RBC #/AREA URNS AUTO: ABNORMAL /HPF
SP GR UR STRIP: 1.02 (ref 1–1.03)
SQUAMOUS #/AREA URNS AUTO: ABNORMAL /LPF
T4 FREE SERPL-MCNC: 0.85 NG/DL (ref 0.76–1.46)
TSH SERPL DL<=0.005 MIU/L-ACNC: 0.24 MU/L (ref 0.4–4)
UROBILINOGEN UR STRIP-ACNC: 1 E.U./DL
WBC # BLD AUTO: 8.6 10E3/UL (ref 4–11)
WBC #/AREA URNS AUTO: ABNORMAL /HPF

## 2022-12-05 PROCEDURE — 90677 PCV20 VACCINE IM: CPT | Performed by: FAMILY MEDICINE

## 2022-12-05 PROCEDURE — 90472 IMMUNIZATION ADMIN EACH ADD: CPT | Performed by: FAMILY MEDICINE

## 2022-12-05 PROCEDURE — 90471 IMMUNIZATION ADMIN: CPT | Performed by: FAMILY MEDICINE

## 2022-12-05 PROCEDURE — 90662 IIV NO PRSV INCREASED AG IM: CPT | Performed by: FAMILY MEDICINE

## 2022-12-05 PROCEDURE — 82306 VITAMIN D 25 HYDROXY: CPT | Performed by: FAMILY MEDICINE

## 2022-12-05 PROCEDURE — 99214 OFFICE O/P EST MOD 30 MIN: CPT | Mod: 25 | Performed by: FAMILY MEDICINE

## 2022-12-05 PROCEDURE — G0103 PSA SCREENING: HCPCS | Performed by: FAMILY MEDICINE

## 2022-12-05 PROCEDURE — G0402 INITIAL PREVENTIVE EXAM: HCPCS | Performed by: FAMILY MEDICINE

## 2022-12-05 PROCEDURE — 36415 COLL VENOUS BLD VENIPUNCTURE: CPT | Performed by: FAMILY MEDICINE

## 2022-12-05 PROCEDURE — 81001 URINALYSIS AUTO W/SCOPE: CPT | Performed by: FAMILY MEDICINE

## 2022-12-05 PROCEDURE — 85027 COMPLETE CBC AUTOMATED: CPT | Performed by: FAMILY MEDICINE

## 2022-12-05 PROCEDURE — 82728 ASSAY OF FERRITIN: CPT | Performed by: FAMILY MEDICINE

## 2022-12-05 PROCEDURE — 84439 ASSAY OF FREE THYROXINE: CPT | Performed by: FAMILY MEDICINE

## 2022-12-05 PROCEDURE — 84443 ASSAY THYROID STIM HORMONE: CPT | Performed by: FAMILY MEDICINE

## 2022-12-05 PROCEDURE — 91313 COVID-19 VACCINE BIVALENT BOOSTER 18+ (MODERNA): CPT | Performed by: FAMILY MEDICINE

## 2022-12-05 PROCEDURE — 0134A COVID-19 VACCINE BIVALENT BOOSTER 18+ (MODERNA): CPT | Performed by: FAMILY MEDICINE

## 2022-12-05 RX ORDER — NYSTATIN 100000 [USP'U]/G
POWDER TOPICAL 2 TIMES DAILY
Qty: 60 G | Refills: 0 | Status: ON HOLD | OUTPATIENT
Start: 2022-12-05 | End: 2023-03-08

## 2022-12-05 ASSESSMENT — ENCOUNTER SYMPTOMS
EYE PAIN: 0
SORE THROAT: 0
PARESTHESIAS: 0
JOINT SWELLING: 1
ARTHRALGIAS: 1
FREQUENCY: 1
HEADACHES: 0
ABDOMINAL PAIN: 1
DIARRHEA: 0
COUGH: 0
DYSURIA: 0
SHORTNESS OF BREATH: 1
WEAKNESS: 1
MYALGIAS: 1
NAUSEA: 0
PALPITATIONS: 0
CHILLS: 1
FEVER: 0
DIZZINESS: 0
HEMATURIA: 0
HEMATOCHEZIA: 0
NERVOUS/ANXIOUS: 0
CONSTIPATION: 0
HEARTBURN: 0

## 2022-12-05 ASSESSMENT — PATIENT HEALTH QUESTIONNAIRE - PHQ9
SUM OF ALL RESPONSES TO PHQ QUESTIONS 1-9: 8
SUM OF ALL RESPONSES TO PHQ QUESTIONS 1-9: 8
10. IF YOU CHECKED OFF ANY PROBLEMS, HOW DIFFICULT HAVE THESE PROBLEMS MADE IT FOR YOU TO DO YOUR WORK, TAKE CARE OF THINGS AT HOME, OR GET ALONG WITH OTHER PEOPLE: SOMEWHAT DIFFICULT

## 2022-12-05 ASSESSMENT — PAIN SCALES - GENERAL: PAINLEVEL: NO PAIN (0)

## 2022-12-05 ASSESSMENT — ACTIVITIES OF DAILY LIVING (ADL): CURRENT_FUNCTION: NO ASSISTANCE NEEDED

## 2022-12-05 NOTE — NURSING NOTE
Prior to immunization administration, verified patients identity using patient s name and date of birth. Please see Immunization Activity for additional information.     Screening Questionnaire for Adult Immunization    Are you sick today?   No   Do you have allergies to medications, food, a vaccine component or latex?   No   Have you ever had a serious reaction after receiving a vaccination?   No   Do you have a long-term health problem with heart, lung, kidney, or metabolic disease (e.g., diabetes), asthma, a blood disorder, no spleen, complement component deficiency, a cochlear implant, or a spinal fluid leak?  Are you on long-term aspirin therapy?   No   Do you have cancer, leukemia, HIV/AIDS, or any other immune system problem?   No   Do you have a parent, brother, or sister with an immune system problem?   No   In the past 3 months, have you taken medications that affect  your immune system, such as prednisone, other steroids, or anticancer drugs; drugs for the treatment of rheumatoid arthritis, Crohn s disease, or psoriasis; or have you had radiation treatments?   No   Have you had a seizure, or a brain or other nervous system problem?   No   During the past year, have you received a transfusion of blood or blood    products, or been given immune (gamma) globulin or antiviral drug?   No   For women: Are you pregnant or is there a chance you could become       pregnant during the next month?   No   Have you received any vaccinations in the past 4 weeks?   No     Immunization questionnaire answers were all negative.        Per orders of Dr. Small, injection of Pnemo 20, flu and Covid moderna given by Sera Stubbs CMA. Patient instructed to remain in clinic for 15 minutes afterwards, and to report any adverse reaction to me immediately.       Screening performed by Sera Stubbs CMA on 12/5/2022 at 9:37 AM.

## 2022-12-05 NOTE — LETTER
[address] [address]  Phone: [phone]    December 5, 2022        Edin Robles  [address] [place] [id number]          To whom it may concern:    RE: Edin Robles    Patient was seen and treated today at our clinic.    Due to medical condition, patient should avoid prolonged (>10 min) exposure to cold temperatures less than 20 degrees     Please contact me for questions or concerns.      Sincerely,        Jessica Small MD

## 2022-12-05 NOTE — PATIENT INSTRUCTIONS
Labs today.    I will update prescriptions when results return.      Vaccines:  flu, COVID booster and pneumonia vaccine.  Shingles vaccine recommended at the pharmacy.    Ultrasound for aneurysm screening -  You can call [phone] to schedule this at the [facility] building in [place]     Referral for colonoscopy - someone will contact you for scheduling.    Trial of powder for foot recommended.  Script sent to the pharmacy.          Patient Education   Personalized Prevention Plan  You are due for the preventive services outlined below.  Your care team is available to assist you in scheduling these services.  If you have already completed any of these items, please share that information with your care team to update in your medical record.  Health Maintenance Due   Topic Date Due    Depression Action Plan  Never done    Colorectal Cancer Screening  Never done    Pneumococcal Vaccine (2 - PCV) 03/03/2015    COVID-19 Vaccine (5 - Booster for Moderna series) 08/15/2022    Flu Vaccine (1) 09/01/2022    Annual Wellness Visit  Never done    AORTIC ANEURYSM SCREENING (SYSTEM ASSIGNED)  Never done    Zoster (Shingles) Vaccine (2 of 2) 01/17/2022       Depression and Suicide in Older Adults    Nearly 2 million older Americans have some type of depression. Some of them even take their own lives. Yet depression among older adults is often ignored. Learn the warning signs. You may help spare a loved one needless pain. You may also save a life.   What is depression?  Depression is a common and serious illness that affects the way you think and feel. It is not a normal part of aging, nor is it a sign of weakness, a character flaw, or something you can snap out of. Most people with depression need treatment to get better. The most common symptom is a feeling of deep sadness. People who are depressed also may seem tired and listless. And nothing seems to give them pleasure. It s normal to grieve or be sad sometimes. But  "sadness lessens or passes with time. Depression rarely goes away or improves on its own. A person with clinical depression can't \"snap out of it.\" Other symptoms of depression are:   Sleeping more or less than normal  Eating more or less than normal  Having headaches, stomachaches, or other pains that don t go away  Feeling nervous,  empty,  or worthless  Crying a great deal  Thinking or talking about suicide or death  Loss of interest in activities previously enjoyed  Social isolation  Feeling confused or forgetful  What causes it?  The causes of depression aren t fully known. But it is thought to result from a complex blend of these factors:   Biochemistry. Certain chemicals in the brain play a role.  Genes. Depression does run in families.  Life stress. Life stresses can also trigger depression in some people. Older adults often face many stressors, such as death of friends or a spouse, health problems, and financial concerns.  Chronic conditions. This includes conditions such as diabetes, heart disease, or cancer. These can cause symptoms of depression. Medicine side effects can cause changes in thoughts and behaviors.  How you can help  Often, depressed people may not want to ask for help. When they do, they may be ignored. Or, they may receive the wrong treatment. You can help by showing parents and older friends love and support. If they seem depressed, don t lecture the person, ignore the symptoms, or discount the symptoms as a  normal  part of aging -which they are not. Get involved, listen, and show interest and support.   Help them understand that depression is a treatable illness. Tell them you can help them find the right treatment. Offer to go to their healthcare provider's appointment with them for support when the symptoms are discussed. With their approval, contact a local mental health center, social service agency, or hospital about services.   You can be an advocate for him or her at healthcare " appointments. Many older adults have chronic illnesses that can cause symptoms of depression. Medicine side effects can change thoughts and behaviors. You can help make sure that the healthcare provider looks at all of these factors. He or she should refer your family member or friend to a mental healthcare provider when needed. in some cases, untreated depression can lead to a misdiagnosis. A person may be diagnosed with a brain disorder such as dementia. If the healthcare provider does not take the issue of depression seriously, help your family member or friend to find another provider.   Don't be afraid to ask  If you think an older person you care about could be suicidal, ask,  Have you thought about suicide?  Most people will tell you the truth. If they say  yes,  they may already have a plan for how and when they will attempt it. Find out as much as you can. The more detailed the plan, and the easier it is to carry out, the more danger the person is in right now. Tell the person you are there for them and do not want them to harm him or herself. Don't wait to get help for the person. Call the person's healthcare provider, local hospital, or emergency services.   To learn more  National Suicide Prevention Lifeline (crisis hotline) 550-726-RJQK (308-086-3667)  National [place] of Mental Gpjovl374-524-1943dzz.nimh.nih.gov  National [place] on Mental Yavdtly769-010-7819zrv.kike.org  Mental Health Qnjltoa624-418-3904lmf.[facility].org  National Suicide Ygnqqlj944-VWRNCKP (724-500-2417)    Call 911  Never leave the person alone. A person who is actively suicidal needs psychiatric care right away. They will need constant supervision. Never leave the person out of sight. Call 911 or the national 24-hour suicide crisis hotline at 254-483-WUXO (149-405-6223). You can also take the person to the closest emergency room.   [employer] last reviewed this educational content on 5/1/2020 2000-2021 [employer]. All  rights reserved. This information is not intended as a substitute for professional medical care. Always follow your healthcare professional's instructions.

## 2022-12-05 NOTE — PROGRESS NOTES
"Mac    The patient's PHQ-9 score is consistent with mild depression. He was provided with information regarding depression and was advised to schedule a follow up appointment in 24 weeks to further address this issue.  Continue current citalopram  SUBJECTIVE:   Edin is a 67 year old who presents for Preventive Visit.  Patient has been advised of split billing requirements and indicates understanding: Yes  Are you in the first 12 months of your Medicare coverage?  Yes,  Visual Acuity:  Right Eye: unable to read   Left Eye: 20/30  Both Eyes: 20/30    Healthy Habits:     In general, how would you rate your overall health?  Fair    Frequency of exercise:  6-7 days/week    Duration of exercise:  Less than 15 minutes    Do you usually eat at least 4 servings of fruit and vegetables a day, include whole grains    & fiber and avoid regularly eating high fat or \"junk\" foods?  No    Taking medications regularly:  Yes    Medication side effects:  None    Ability to successfully perform activities of daily living:  No assistance needed    Home Safety:  Lighting is poor    Hearing Impairment:  No hearing concerns    In the past 6 months, have you been bothered by leaking of urine? Yes    In general, how would you rate your overall mental or emotional health?  Good      PHQ-2 Total Score: 2    Additional concerns today:  Yes      Have you ever done Advance Care Planning? (For example, a Health Directive, POLST, or a discussion with a medical provider or your loved ones about your wishes): No, advance care planning information given to patient to review.  Patient plans to discuss their wishes with loved ones or provider.        Fall risk  Fallen 2 or more times in the past year?: No  Any fall with injury in the past year?: No  click delete button to remove this line now  Cognitive Screening   1) Repeat 3 items (Leader, Season, Table)    2) Clock draw: NORMAL  3) 3 item recall: Recalls 2 objects   Results: NORMAL clock, 1-2 " items recalled: COGNITIVE IMPAIRMENT LESS LIKELY    Mini-CogTM Copyright SEAMUS Prasad. Licensed by the author for use in [facility]; reprinted with permission (nadine@[facility]). All rights reserved.      Do you have sleep apnea, excessive snoring or daytime drowsiness?: yes    Reviewed and updated as needed this visit by clinical staff   Tobacco  Allergies  Meds   Med Hx  Surg Hx  Fam Hx          Reviewed and updated as needed this visit by Provider   Tobacco  Allergies  Meds   Med Hx  Surg Hx  Fam Hx         Social History     Tobacco Use     Smoking status: Former     Types: Cigarettes     Quit date: 2021     Years since quittin.3     Smokeless tobacco: Never   Substance Use Topics     Alcohol use: Yes     Comment: beer per day, on average     If you drink alcohol do you typically have >3 drinks per day or >7 drinks per week? No    Alcohol Use 2022   Prescreen: >3 drinks/day or >7 drinks/week? No   Prescreen: >3 drinks/day or >7 drinks/week? -   No flowsheet data found.        Hyperlipidemia Follow-Up    Are you regularly taking any medication or supplement to lower your cholesterol?   Yes- Lipitor  Are you having muscle aches or other side effects that you think could be caused by your cholesterol lowering medication?  No    Hypertension Follow-up    Do you check your blood pressure regularly outside of the clinic? No   Are you following a low salt diet? Yes  Are your blood pressures ever more than 140 on the top number (systolic) OR more   than 90 on the bottom number (diastolic), for example 140/90? No    Depression Followup  How are you doing with your depression since your last visit? Improved   Are you having other symptoms that might be associated with depression? No  Have you had a significant life event?  No   Are you feeling anxious or having panic attacks?   No  Do you have any concerns with your use of alcohol or other drugs? 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    Urinary frequency -denies dysuria or visible hematuria.  Denies difficulty emptying his bladder.    Raynaud's syndrome  -painfulness in his fingers when exposed to extreme cold.  After being out in 20 degree below weather for more than 10 minutes even with heated gloves he will experience extreme pain in the fingers which requires half hour to 45 minutes to resolve once he returns anxiety.  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    Tinea pedis of left foot -ongoing itchy irritation at the base of the fifth toe.  Has tried the ketoconazole cream that was given without improvement.    Cocaine abuse in remission (H) -discussed past history regarding this reports it is no longer an issue he stopped using this many years ago and has not had any recurrence.        Current providers sharing in care for this patient include:   Patient Care Team:  No Ref-Primary, Physician as PCP - Jessica Ramos MD as Assigned PCP    The following health maintenance items are reviewed in Epic and correct as of today:  Health Maintenance   Topic Date Due     DEPRESSION ACTION PLAN  Never done     COLORECTAL CANCER SCREENING  Never done      MEDICARE ANNUAL WELLNESS VISIT  Never done     AORTIC ANEURYSM SCREENING (SYSTEM ASSIGNED)  Never done     ZOSTER IMMUNIZATION (2 of 2) 01/17/2022     PHQ-9  06/05/2023     ANNUAL REVIEW OF HM ORDERS  08/18/2023     FALL RISK ASSESSMENT  12/05/2023     LIPID  08/25/2027     ADVANCE CARE PLANNING  12/05/2027     DTAP/TDAP/TD IMMUNIZATION (4 - Td or Tdap) 02/01/2032     HEPATITIS C SCREENING  Completed     HIV SCREENING  Completed     INFLUENZA VACCINE  Completed     Pneumococcal Vaccine: 65+ Years  Completed     COVID-19 Vaccine  Completed     IPV IMMUNIZATION  Aged Out     MENINGITIS IMMUNIZATION  Aged Out     LUNG CANCER SCREENING  Discontinued     BP Readings from Last 3 Encounters:   12/05/22 114/81   08/01/22 106/73   10/08/20 (!) 135/96    Wt Readings from Last 3 Encounters:   12/05/22 97.3 kg (214 lb 6.4 oz)   08/01/22 92.6 kg (204 lb 1.6 oz)             Pneumonia Vaccine:P20 today        Review of Systems   Constitutional: Positive for chills. Negative for fever.   HENT: Negative for congestion, ear pain, hearing loss and sore throat.    Eyes: Negative for pain and visual disturbance.   Respiratory: Positive for shortness of breath. Negative for cough.    Cardiovascular: Negative for chest pain, palpitations and peripheral edema.   Gastrointestinal: Positive for abdominal pain. Negative for constipation, diarrhea, heartburn, hematochezia and nausea.   Genitourinary: Positive for frequency and urgency. Negative for dysuria, genital sores, hematuria, impotence and penile discharge.   Musculoskeletal: Positive for arthralgias, joint swelling and myalgias.   Skin: Negative for rash.   Neurological: Positive for weakness. Negative for dizziness, headaches and paresthesias.   Psychiatric/Behavioral: Positive for mood changes. The patient is not nervous/anxious.    back pain. at work - walking.    Chills:  A couple weeks ago with nasal congestion, cold symptoms.    SOB with stairs only.  No swelling in ankles, no  "PND    Urinary symptoms - taking saw palmento 2 twice a day  - ? If took too many - cramping then resolved after a few hours.    Wore out, fatigued.    Back pain - arm pain with activity - using icyhot.        OBJECTIVE:   /81 (BP Location: Right arm, Patient Position: Sitting, Cuff Size: Adult Large)   Pulse 71   Temp 97.9  F (36.6  C) (Oral)   Resp 18   Ht 1.816 m (5' 11.5\")   Wt 97.3 kg (214 lb 6.4 oz)   SpO2 97%   BMI 29.49 kg/m   Estimated body mass index is 29.49 kg/m  as calculated from the following:    Height as of this encounter: 1.816 m (5' 11.5\").    Weight as of this encounter: 97.3 kg (214 lb 6.4 oz).  Physical Exam  GENERAL: healthy, alert and no distress  EYES: Eyes grossly normal to inspection, PERRL and conjunctivae and sclerae normal  HENT: ear canals and TM's normal, nose and mouth without ulcers or lesions  NECK: no adenopathy, no asymmetry, masses, or scars and thyroid normal to palpation  RESP: lungs clear to auscultation - no rales, rhonchi or wheezes  CV: regular rate and rhythm, normal S1 S2, no S3 or S4, no murmur, click or rub, no peripheral edema and peripheral pulses strong  ABDOMEN: soft, nontender, no hepatosplenomegaly, no masses and bowel sounds normal  MS: Full range of motion all extremities.  Tenderness to palpation over the right lateral epicondyle     SKIN: no suspicious lesions or rashes and plantar surface left foot at the base of the fifth toe is noted to have mild maceration and whitened appearance.  This is tender to maneuver the toe but no erythema or purulent drainage noted.  NEURO: Normal strength and tone, mentation intact and speech normal  PSYCH: mentation appears normal, affect normal/bright    Diagnostic Test Results:  Labs reviewed in Epic  Results for orders placed or performed in visit on 12/05/22   TSH with free T4 reflex     Status: Abnormal   Result Value Ref Range    TSH 0.24 (L) 0.40 - 4.00 mU/L   CBC with platelets     Status: Normal   Result " Value Ref Range    WBC Count 8.6 4.0 - 11.0 10e3/uL    RBC Count 5.27 4.40 - 5.90 10e6/uL    Hemoglobin 14.9 13.3 - 17.7 g/dL    Hematocrit 46.7 40.0 - 53.0 %    MCV 89 78 - 100 fL    MCH 28.3 26.5 - 33.0 pg    MCHC 31.9 31.5 - 36.5 g/dL    RDW 14.5 10.0 - 15.0 %    Platelet Count 224 150 - 450 10e3/uL   Ferritin     Status: Normal   Result Value Ref Range    Ferritin 191 26 - 388 ng/mL   PSA, screen     Status: Normal   Result Value Ref Range    Prostate Specific Antigen Screen 2.35 0.00 - 4.00 ug/L   UA Macro with Reflex to Micro and Culture - lab collect     Status: Abnormal    Specimen: Urine, Midstream   Result Value Ref Range    Color Urine Yellow Colorless, Straw, Light Yellow, Yellow    Appearance Urine Clear Clear    Glucose Urine Negative Negative mg/dL    Bilirubin Urine Negative Negative    Ketones Urine Negative Negative mg/dL    Specific Gravity Urine 1.025 1.003 - 1.035    Blood Urine Trace (A) Negative    pH Urine 6.5 5.0 - 7.0    Protein Albumin Urine Negative Negative mg/dL    Urobilinogen Urine 1.0 0.2, 1.0 E.U./dL    Nitrite Urine Negative Negative    Leukocyte Esterase Urine Negative Negative   Urine Microscopic     Status: Abnormal   Result Value Ref Range    Bacteria Urine Few (A) None Seen /HPF    RBC Urine 5-10 (A) 0-2 /HPF /HPF    WBC Urine 0-5 0-5 /HPF /HPF    Squamous Epithelials Urine None Seen None Seen /LPF    Narrative    Urine Culture not indicated   T4 free     Status: Normal   Result Value Ref Range    Free T4 0.85 0.76 - 1.46 ng/dL       ASSESSMENT / PLAN:   (Z00.00) Encounter for Medicare annual wellness exam  (primary encounter diagnosis)  Comment:   Plan: Screening and preventative care discussed    (R53.82) Chronic fatigue  Comment: Laboratory evaluation to assess for symptoms of fatigue  Plan: Vitamin D Deficiency, CBC with platelets,         Ferritin            (F33.1) Major depressive disorder, recurrent episode, moderate (H)  Comment: Improvement in mood noted  Plan:  Continue current citalopram    (I10) Primary hypertension  Comment: Blood pressure well controlled  Plan: Refills for amlodipine and hydrochlorothiazide sent to pharmacy.    (I73.00) Raynaud's disease without gangrene  Comment: Environmental modifications to avoid prolonged cold exposure.  A letter was given for work to assist with this hopefully.  Plan: Continue amlodipine and activity adjustments as noted above.    (E78.5) Hyperlipidemia, unspecified hyperlipidemia type  Comment: Good control of cholesterol and recent testing  Plan: Updated prescription for Lipitor sent to the pharmacy    (M77.11) Lateral epicondylitis of right elbow  Comment: Repetitive motion of the arm resulting in a tendinitis at the elbow.  Plan: Wrist/Arm/Hand Supplies Order for DME - ONLY         FOR DME        Tennis elbow band was given.  He should wear this while he is doing that activity and follow-up if symptoms are not resolved.    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)  Comment: No current or recent use  Plan: Congratulated him on his sobriety    (Z12.5) Screening for prostate cancer  Comment:   Plan: PSA, screen        Normal level    (Z13.6) Encounter for abdominal aortic aneurysm (AAA) screening  Comment: Discussed he is a candidate for evaluation for AAA  Plan: US Aorta Medicare AAA Screening        Order placed and he will call to schedule appointment    (Z12.11) Screen for colon  cancer  Comment: Due for colon cancer screening  Plan: Colonoscopy Screening  Referral        Someone will call him to set up the appointment    (Z23) High priority for 2019-nCoV vaccine  Comment:   Plan: COVID-19,PF,MODERNA BIVALENT 18+Yrs        By valent booster today    (Z23) Need for prophylactic vaccination and inoculation against influenza  Comment: Seasonal vaccine needed  Plan: INFLUENZA, QUAD, HIGH DOSE, PF, 65YR + (FLUZONE        HD)            (R31.29) Microscopic hematuria  Comment: Laboratory testing indicated microscopic hematuria.  Plan: CT Abdomen Pelvis w/o & w Contrast        Additional evaluation with CT urogram.  Probable urology referral once results return      Patient has been advised of split billing requirements and indicates understanding: Yes      COUNSELING:  Reviewed preventive health counseling, as reflected in patient instructions       Consider AAA screening for ages 65-75 and smoking history       Regular exercise       Healthy diet/nutrition       Vision screening       Hearing screening       Dental care       Bladder control       Fall risk prevention       Immunizations  Vaccinated for: Covid-19 and Influenza and Declined: Zoster due to Other   will consider in the future               Consider lung cancer screening for ages 55-80 years (77 for Medicare) and 20 pack-year smoking history        Colon cancer screening       Prostate cancer screening        He reports that he quit smoking about 16 months ago. His smoking use included cigarettes. He has never used smokeless tobacco.  And adequate nicotine exposure -2 packs/week over 58 years    Appropriate preventive services were discussed with this patient, including applicable screening as appropriate for cardiovascular disease, diabetes, osteopenia/osteoporosis, and glaucoma.  As appropriate for age/gender, discussed screening for colorectal cancer, prostate cancer, breast cancer, and cervical cancer. Checklist  reviewing preventive services available has been given to the patient.    Reviewed patients plan of care and provided an AVS. The Basic Care Plan (routine screening as documented in Health Maintenance) for Edin meets the Care Plan requirement. This Care Plan has been established and reviewed with the Patient.          Jessica Small MD  [facility]    Identified Health Risks:  Answers for HPI/ROS submitted by the patient on 12/5/2022  If you checked off any problems, how difficult have these problems made it for you to do your work, take care of things at home, or get along with other people?: Somewhat difficult  PHQ9 TOTAL SCORE: 8            Patient Instructions     Labs today.    I will update prescriptions when results return.      Vaccines:  flu, COVID booster and pneumonia vaccine.  Shingles vaccine recommended at the pharmacy.    Ultrasound for aneurysm screening -  You can call [phone] to schedule this at the [facility] building in [place]     Referral for colonoscopy - someone will contact you for scheduling.    Trial of powder for foot recommended.  Script sent to the pharmacy.          Patient Education   Personalized Prevention Plan  You are due for the preventive services outlined below.  Your care team is available to assist you in scheduling these services.  If you have already completed any of these items, please share that information with your care team to update in your medical record.  Health Maintenance Due   Topic Date Due     Depression Action Plan  Never done     Colorectal Cancer Screening  Never done     Pneumococcal Vaccine (2 - PCV) 03/03/2015     COVID-19 Vaccine (5 - Booster for Moderna series) 08/15/2022     Flu Vaccine (1) 09/01/2022     Annual Wellness Visit  Never done     AORTIC ANEURYSM SCREENING (SYSTEM ASSIGNED)  Never done     Zoster (Shingles) Vaccine (2 of 2) 01/17/2022                    This chart was documented by provider using a voice  activated software called Dragon in addition to manual typing. There may be vocabulary errors or other grammatical errors due to this.

## 2022-12-06 PROBLEM — F14.11 COCAINE ABUSE IN REMISSION (H): Status: ACTIVE | Noted: 2022-12-06

## 2022-12-06 LAB — DEPRECATED CALCIDIOL+CALCIFEROL SERPL-MC: 24 UG/L (ref 20–75)

## 2022-12-06 RX ORDER — HYDROCHLOROTHIAZIDE 25 MG/1
25 TABLET ORAL DAILY
Qty: 90 TABLET | Refills: 1 | Status: SHIPPED | OUTPATIENT
Start: 2022-12-06 | End: 2023-06-07

## 2022-12-06 RX ORDER — ATORVASTATIN CALCIUM 20 MG/1
20 TABLET, FILM COATED ORAL DAILY
Qty: 90 TABLET | Refills: 2 | Status: SHIPPED | OUTPATIENT
Start: 2022-12-06 | End: 2023-09-05

## 2022-12-06 RX ORDER — AMLODIPINE BESYLATE 10 MG/1
10 TABLET ORAL DAILY
Qty: 90 TABLET | Refills: 1 | Status: SHIPPED | OUTPATIENT
Start: 2022-12-06 | End: 2023-06-07

## 2022-12-06 NOTE — RESULT ENCOUNTER NOTE
Please call patient: See results below.    I would recommend a CT scan for evaluation of the blood in the urine that was noted on his testing.  Orders placed and he can schedule this at [place].      Your urine testing shows blood in the urine.  There is no sign of infection.  There are a variety of reasons why this can occur.  I would recommend some additional evaluation with a CT scan and potential referral to urology.  We will be in contact regarding this.  Your thyroid testing is normal with a normal active thyroid hormone (free T4).  Your blood cell counts and iron levels are normal.  Prostate cancer screening test is normal as well.  Please call or MyChart message me if you have any questions.      ESTRADA

## 2022-12-11 ENCOUNTER — TELEPHONE (OUTPATIENT)
Dept: FAMILY MEDICINE | Facility: CLINIC | Age: 67
End: 2022-12-11

## 2022-12-12 NOTE — TELEPHONE ENCOUNTER
Please call patient: See results below.     I would recommend a CT scan for evaluation of the blood in the urine that was noted on his testing.  Orders placed and he can schedule this at [place].        Your urine testing shows blood in the urine.  There is no sign of infection.  There are a variety of reasons why this can occur.  I would recommend some additional evaluation with a CT scan and potential referral to urology.  We will be in contact regarding this.  Your thyroid testing is normal with a normal active thyroid hormone (free T4).  Your blood cell counts and iron levels are normal.  Prostate cancer screening test is normal as well.  Please call or MyChart message me if you have any questions.        ESTRADA

## 2022-12-12 NOTE — RESULT ENCOUNTER NOTE
Your vitamin D level is normal but on the low side.  This is likely to worsen over the winter months with less sun exposure.   I would recommend you continue your current supplement for the summer months but an additional increase in vitamin D supplement to 1000 IU a day for the fall and winter is recommended.  This can be obtained over the counter or as a prescription.  If you desire a prescription, please notify the office and this will be sent to your pharmacy.    Please call or [employer] message me if you have any questions.      ESTRADA

## 2022-12-12 NOTE — TELEPHONE ENCOUNTER
This writer attempted to contact patient on 12/12/22    Reason for call provider's message and left message.    If patient calls back:   Relay message below, document that pt called, and close encounter.    VIRAJ Connolly  [facility]

## 2022-12-13 NOTE — TELEPHONE ENCOUNTER
RN called and LVM to call clinic at [phone].     If patient calls back please relay provider message below.    Dian CONSTANTINO, RN

## 2022-12-14 ENCOUNTER — ANCILLARY PROCEDURE (OUTPATIENT)
Dept: ULTRASOUND IMAGING | Facility: CLINIC | Age: 67
End: 2022-12-14
Attending: FAMILY MEDICINE
Payer: COMMERCIAL

## 2022-12-14 ENCOUNTER — ANCILLARY PROCEDURE (OUTPATIENT)
Dept: CT IMAGING | Facility: CLINIC | Age: 67
End: 2022-12-14
Attending: FAMILY MEDICINE
Payer: COMMERCIAL

## 2022-12-14 DIAGNOSIS — R31.29 MICROSCOPIC HEMATURIA: ICD-10-CM

## 2022-12-14 DIAGNOSIS — Z13.6 ENCOUNTER FOR ABDOMINAL AORTIC ANEURYSM (AAA) SCREENING: ICD-10-CM

## 2022-12-14 LAB
CREAT BLD-MCNC: 1.1 MG/DL (ref 0.7–1.3)
GFR SERPL CREATININE-BSD FRML MDRD: >60 ML/MIN/1.73M2

## 2022-12-14 PROCEDURE — 82565 ASSAY OF CREATININE: CPT | Performed by: PATHOLOGY

## 2022-12-14 PROCEDURE — 76706 US ABDL AORTA SCREEN AAA: CPT | Mod: GC | Performed by: RADIOLOGY

## 2022-12-14 PROCEDURE — 74178 CT ABD&PLV WO CNTR FLWD CNTR: CPT | Mod: GC | Performed by: RADIOLOGY

## 2022-12-14 RX ORDER — IOPAMIDOL 755 MG/ML
122 INJECTION, SOLUTION INTRAVASCULAR ONCE
Status: COMPLETED | OUTPATIENT
Start: 2022-12-14 | End: 2022-12-14

## 2022-12-14 RX ADMIN — IOPAMIDOL 122 ML: 755 INJECTION, SOLUTION INTRAVASCULAR at 07:55

## 2022-12-14 NOTE — TELEPHONE ENCOUNTER
RN called patient and read provider message below to him. Patient verbalized good understanding and states he completed the CT scan today 12/14/22.     No further questions or concerns at this time.     Ciara Kumar RN    [facility]

## 2022-12-18 PROBLEM — K86.2 PANCREATIC CYST: Status: ACTIVE | Noted: 2022-12-18

## 2022-12-27 ENCOUNTER — TELEPHONE (OUTPATIENT)
Dept: GASTROENTEROLOGY | Facility: CLINIC | Age: 67
End: 2022-12-27

## 2022-12-27 DIAGNOSIS — Z12.11 ENCOUNTER FOR SCREENING COLONOSCOPY: Primary | ICD-10-CM

## 2022-12-27 RX ORDER — BISACODYL 5 MG
TABLET, DELAYED RELEASE (ENTERIC COATED) ORAL
Qty: 4 TABLET | Refills: 0 | Status: ON HOLD | OUTPATIENT
Start: 2022-12-27 | End: 2023-03-08

## 2022-12-27 NOTE — TELEPHONE ENCOUNTER
Patient scheduled for colonoscopy  on 1/6/2023.     Discuss Covid policy.     Arrival time: 0800    [facility]; [address] [place] [address], [address] Floor, [place] [address]    Sedation type: MAC    Anticoagulations? No    Electronic implanted devices? No    Diabetic? No    Indication for procedure: Screening    Bowel prep recommendation: Standard Golytely     Prep instructions sent via [employer] Bowel prep script sent to [id number] - [facility], [place] - [address] [facility]    Pre visit planning completed.    Ladan Horton RN

## 2022-12-27 NOTE — TELEPHONE ENCOUNTER
Pre assessment questions completed for upcoming colonoscopy  procedure scheduled on 1/6/23    COVID policy reviewed.     Reviewed procedural arrival time 0800 and facility location [facility]; [address] [place] [address], [address] Floor, [place] [address]    Designated  policy reviewed. Instructed to have someone stay 24 hours post procedure.     Anticoagulation/blood thinners? No    Electronic implanted devices? No    Reviewed procedure prep instructions.     Patient verbalized understanding and had no questions or concerns at this time.    Ladan Horton RN

## 2022-12-27 NOTE — TELEPHONE ENCOUNTER
Screening Questions  BLUE  KIND OF PREP RED  LOCATION [review exclusion criteria] GREEN  SEDATION TYPE        Yes Are you active on mychart?       [place] Ordering/Referring Provider?        [facility] What type of coverage do you have?      N Have you had a positive covid test in the last 14 days?     27.7 1. BMI  [BMI 40+ - review exclusion criteria]    Yes  2. Are you able to give consent for your medical care? [IF NO,RN REVIEW]        N  3. Are you taking any prescription pain medications on a routine schedule?      NA  3a. EXTENDED PREP What kind of prescription?     Yes-in remission 4. Do you have any chemical dependencies such as alcohol, street drugs, or methadone?    Yes 5. Do you have any history of post-traumatic stress syndrome, severe anxiety or history of psychosis?      **If yes 3- 5 , please schedule with MAC sedation.**          IF YES TO ANY 6 - 10 - HOSPITAL SETTING ONLY.     N 6.   Do you need assistance transferring?     N 7.   Have you had a heart or lung transplant?    N 8.   Are you currently on dialysis?   N 9.   Do you use daily home oxygen?   N 10. Do you take nitroglycerin?   10a. NA If yes, how often?     11. [FEMALES]  NA Are you currently pregnant?    11a. NA If yes, how many weeks? [ Greater than 12 weeks, OR NEEDED]    N 12. Do you have Pulmonary Hypertension? *NEED PAC APPT AT [facility]*     N 13. [review exclusion criteria]  Do you have any implantable devices in your body (pacemaker, defib, LVAD)?    N 14. In the past 6 months, have you had any heart related issues including cardiomyopathy or heart attack?     14a. N If yes, did it require cardiac stenting if so when?     N 15. Have you had a stroke or Transient ischemic attack (TIA - aka  mini stroke ) within 6 months?      N 16. Do you have mod to severe Obstructive Sleep Apnea?  [Hospital only - Ok at [facility]]    N 17. Do you have SEVERE AND UNCONTROLLED asthma? *NEED PAC APPT AT [facility]*     N 18. Are you currently taking any blood  "thinners?     18a. If yes, inform patient to \"follow up w/ ordering provider for bridging instructions.\"    N 19. Do you take the medication Phentermine?    19a. If yes, \"Hold for 7 days before procedure.  Please consult your prescribing provider if you have questions about holding this medication.\"     N  20. Do you have chronic kidney disease?      N  21. Do you have a diagnosis of diabetes?     N  22. On a regular basis do you go 3-5 days between bowel movements?      23. Preferred LOCAL Pharmacy for Pre Prescription    [ LIST ONLY ONE PHARMACY]     [id number] [place] [facility] [address]        - CLOSING REMINDERS -    Informed patient they will need an adult    Cannot take any type of public or medical transportation alone    Conscious Sedation- Needs  for 6 hours after the procedure       MAC/General-Needs  for 24 hours after procedure    Pre-Procedure Covid test to be completed [[facility] PCR Testing Required]    Confirmed Nurse will call to complete assessment       - SCHEDULING DETAILS -  N Hospital Setting Required? If yes, what is the exclusion?: BRYANNA Tucker  Surgeon    1/6/23  Date of Procedure  Lower Endoscopy [Colonoscopy]  Type of Procedure Scheduled  [facility] [place] [facility]   [facility]-If you answer yes to questions #8, #20, #21Which Colonoscopy Prep was Sent?     MAC Sedation Type     NO PAC / Pre-op Required                 "

## 2023-01-05 ENCOUNTER — ANESTHESIA EVENT (OUTPATIENT)
Dept: SURGERY | Facility: AMBULATORY SURGERY CENTER | Age: 68
End: 2023-01-05
Payer: COMMERCIAL

## 2023-01-06 ENCOUNTER — ANESTHESIA (OUTPATIENT)
Dept: SURGERY | Facility: AMBULATORY SURGERY CENTER | Age: 68
End: 2023-01-06
Payer: COMMERCIAL

## 2023-01-06 ENCOUNTER — HOSPITAL ENCOUNTER (OUTPATIENT)
Facility: AMBULATORY SURGERY CENTER | Age: 68
Discharge: HOME | End: 2023-01-06
Attending: INTERNAL MEDICINE
Payer: COMMERCIAL

## 2023-01-06 VITALS
WEIGHT: 214 LBS | SYSTOLIC BLOOD PRESSURE: 128 MMHG | DIASTOLIC BLOOD PRESSURE: 94 MMHG | HEIGHT: 73 IN | OXYGEN SATURATION: 97 % | RESPIRATION RATE: 16 BRPM | TEMPERATURE: 97.4 F | BODY MASS INDEX: 28.36 KG/M2 | HEART RATE: 74 BPM

## 2023-01-06 VITALS — HEART RATE: 73 BPM

## 2023-01-06 DIAGNOSIS — C18.2 MALIGNANT NEOPLASM OF ASCENDING COLON (H): Primary | ICD-10-CM

## 2023-01-06 LAB — COLONOSCOPY: NORMAL

## 2023-01-06 PROCEDURE — 45380 COLONOSCOPY AND BIOPSY: CPT | Mod: 59,33

## 2023-01-06 PROCEDURE — 45385 COLONOSCOPY W/LESION REMOVAL: CPT | Mod: 33

## 2023-01-06 PROCEDURE — 45381 COLONOSCOPY SUBMUCOUS NJX: CPT | Mod: 33

## 2023-01-06 PROCEDURE — 88305 TISSUE EXAM BY PATHOLOGIST: CPT | Mod: TC | Performed by: INTERNAL MEDICINE

## 2023-01-06 PROCEDURE — 88305 TISSUE EXAM BY PATHOLOGIST: CPT | Mod: 26 | Performed by: PATHOLOGY

## 2023-01-06 RX ORDER — LIDOCAINE HYDROCHLORIDE 20 MG/ML
INJECTION, SOLUTION INFILTRATION; PERINEURAL PRN
Status: DISCONTINUED | OUTPATIENT
Start: 2023-01-06 | End: 2023-01-06

## 2023-01-06 RX ORDER — NALOXONE HYDROCHLORIDE 0.4 MG/ML
0.4 INJECTION, SOLUTION INTRAMUSCULAR; INTRAVENOUS; SUBCUTANEOUS
Status: DISCONTINUED | OUTPATIENT
Start: 2023-01-06 | End: 2023-01-07 | Stop reason: HOSPADM

## 2023-01-06 RX ORDER — SODIUM CHLORIDE, SODIUM LACTATE, POTASSIUM CHLORIDE, CALCIUM CHLORIDE 600; 310; 30; 20 MG/100ML; MG/100ML; MG/100ML; MG/100ML
INJECTION, SOLUTION INTRAVENOUS CONTINUOUS PRN
Status: DISCONTINUED | OUTPATIENT
Start: 2023-01-06 | End: 2023-01-06

## 2023-01-06 RX ORDER — NALOXONE HYDROCHLORIDE 0.4 MG/ML
0.2 INJECTION, SOLUTION INTRAMUSCULAR; INTRAVENOUS; SUBCUTANEOUS
Status: DISCONTINUED | OUTPATIENT
Start: 2023-01-06 | End: 2023-01-07 | Stop reason: HOSPADM

## 2023-01-06 RX ORDER — LIDOCAINE 40 MG/G
CREAM TOPICAL
Status: CANCELLED | OUTPATIENT
Start: 2023-01-06

## 2023-01-06 RX ORDER — PROCHLORPERAZINE MALEATE 5 MG
5 TABLET ORAL EVERY 6 HOURS PRN
Status: DISCONTINUED | OUTPATIENT
Start: 2023-01-06 | End: 2023-01-07 | Stop reason: HOSPADM

## 2023-01-06 RX ORDER — FLUMAZENIL 0.1 MG/ML
0.2 INJECTION, SOLUTION INTRAVENOUS
Status: ACTIVE | OUTPATIENT
Start: 2023-01-06 | End: 2023-01-06

## 2023-01-06 RX ORDER — PROPOFOL 10 MG/ML
INJECTION, EMULSION INTRAVENOUS CONTINUOUS PRN
Status: DISCONTINUED | OUTPATIENT
Start: 2023-01-06 | End: 2023-01-06

## 2023-01-06 RX ORDER — ONDANSETRON 2 MG/ML
4 INJECTION INTRAMUSCULAR; INTRAVENOUS
Status: CANCELLED | OUTPATIENT
Start: 2023-01-06

## 2023-01-06 RX ORDER — ONDANSETRON 2 MG/ML
4 INJECTION INTRAMUSCULAR; INTRAVENOUS EVERY 6 HOURS PRN
Status: DISCONTINUED | OUTPATIENT
Start: 2023-01-06 | End: 2023-01-07 | Stop reason: HOSPADM

## 2023-01-06 RX ORDER — PROPOFOL 10 MG/ML
INJECTION, EMULSION INTRAVENOUS PRN
Status: DISCONTINUED | OUTPATIENT
Start: 2023-01-06 | End: 2023-01-06

## 2023-01-06 RX ORDER — ONDANSETRON 4 MG/1
4 TABLET, ORALLY DISINTEGRATING ORAL EVERY 6 HOURS PRN
Status: DISCONTINUED | OUTPATIENT
Start: 2023-01-06 | End: 2023-01-07 | Stop reason: HOSPADM

## 2023-01-06 RX ADMIN — LIDOCAINE HYDROCHLORIDE 50 MG: 20 INJECTION, SOLUTION INFILTRATION; PERINEURAL at 08:49

## 2023-01-06 RX ADMIN — PROPOFOL 150 MCG/KG/MIN: 10 INJECTION, EMULSION INTRAVENOUS at 08:50

## 2023-01-06 RX ADMIN — PROPOFOL 100 MG: 10 INJECTION, EMULSION INTRAVENOUS at 08:50

## 2023-01-06 RX ADMIN — SODIUM CHLORIDE, SODIUM LACTATE, POTASSIUM CHLORIDE, CALCIUM CHLORIDE: 600; 310; 30; 20 INJECTION, SOLUTION INTRAVENOUS at 08:44

## 2023-01-06 NOTE — ANESTHESIA PREPROCEDURE EVALUATION
Anesthesia Pre-Procedure Evaluation    Patient: Edin Robles   MRN: [id number] : 1955        Procedure : Procedure(s):  COLONOSCOPY          Past Medical History:   Diagnosis Date     Depressive disorder      Hypertension       History reviewed. No pertinent surgical history.   Allergies   Allergen Reactions     Other Food Allergy      PN: LW Other1: -lactose intolerant      Social History     Tobacco Use     Smoking status: Former     Types: Cigarettes     Quit date: 2021     Years since quittin.4     Smokeless tobacco: Never   Substance Use Topics     Alcohol use: Yes     Comment: beer per day, on average      Wt Readings from Last 1 Encounters:   23 97.1 kg (214 lb)        Anesthesia Evaluation            ROS/MED HX  ENT/Pulmonary:  - neg pulmonary ROS     Neurologic:  - neg neurologic ROS     Cardiovascular:     (+) hypertension-----    METS/Exercise Tolerance:     Hematologic:  - neg hematologic  ROS     Musculoskeletal:       GI/Hepatic:     (+) bowel prep,     Renal/Genitourinary:  - neg Renal ROS     Endo:  - neg endo ROS     Psychiatric/Substance Use:  - neg psychiatric ROS     Infectious Disease:  - neg infectious disease ROS     Malignancy:  - neg malignancy ROS     Other:               OUTSIDE LABS:  CBC:   Lab Results   Component Value Date    WBC 8.6 2022    WBC 7.7 2022    HGB 14.9 2022    HGB 15.0 2022    HCT 46.7 2022    HCT 46.6 2022     2022     2022     BMP:   Lab Results   Component Value Date     2022     10/08/2020    POTASSIUM 3.9 2022    POTASSIUM 3.4 10/08/2020    CHLORIDE 111 (H) 2022    CHLORIDE 107 10/08/2020    CO2 27 2022    CO2 32 10/08/2020    BUN 19 2022    BUN 23 10/08/2020    CR 1.1 2022    CR 1.10 2022     (H) 2022    GLC 98 10/08/2020     COAGS: No results found for: PTT, INR, FIBR  POC: No results found for: BGM, HCG,  HCGS  HEPATIC:   Lab Results   Component Value Date    ALBUMIN 4.0 08/25/2022    PROTTOTAL 7.3 08/25/2022    ALT 28 08/25/2022    AST 21 08/25/2022    ALKPHOS 72 08/25/2022    BILITOTAL 0.8 08/25/2022     OTHER:   Lab Results   Component Value Date    LEA 9.5 08/25/2022    TSH 0.24 (L) 12/05/2022    T4 0.85 12/05/2022       Anesthesia Plan    ASA Status:  2      Anesthesia Type: MAC.     - Reason for MAC: immobility needed, straight local not clinically adequate              Consents    Anesthesia Plan(s) and associated risks, benefits, and realistic alternatives discussed. Questions answered and patient/representative(s) expressed understanding.     - Discussed: Risks, Benefits and Alternatives for BOTH SEDATION and the PROCEDURE were discussed     - Discussed with:  Patient      - Extended Intubation/Ventilatory Support Discussed: No.      - Patient is DNR/DNI Status: No    Use of blood products discussed: No .     Postoperative Care    Pain management: IV analgesics, Oral pain medications.        Comments:                Keaton Elias MD, MD

## 2023-01-06 NOTE — H&P
Edin Robles  [id number]  male  67 year old      Reason for procedure/surgery: Colon cancer screening    Patient Active Problem List   Diagnosis     Major depressive disorder, recurrent episode, moderate (H)     Chronic thoracic back pain, unspecified back pain laterality     Primary hypertension     Hyperlipidemia, unspecified hyperlipidemia type     Low TSH level     Cocaine abuse in remission (H)     Pancreatic cyst       Past Surgical History:  No past surgical history on file.    Past Medical History:   Past Medical History:   Diagnosis Date     Depressive disorder      Hypertension        Social History:   Social History     Tobacco Use     Smoking status: Former     Types: Cigarettes     Quit date: 2021     Years since quittin.4     Smokeless tobacco: Never   Substance Use Topics     Alcohol use: Yes     Comment: beer per day, on average       Family History:   Family History   Problem Relation Age of Onset     Hypertension Mother      Hypertension Father      Hypertension Maternal Grandmother      Hypertension Maternal Grandfather      Hypertension Paternal Grandmother      Hypertension Paternal Grandfather        Allergies:   Allergies   Allergen Reactions     Other Food Allergy      PN: LW Other1: -lactose intolerant       Active Medications:   Current Outpatient Medications   Medication Sig Dispense Refill     amLODIPine (NORVASC) 10 MG tablet Take 1 tablet (10 mg) by mouth daily 90 tablet 1     aspirin 81 MG EC tablet Take 81 mg by mouth daily       atorvastatin (LIPITOR) 20 MG tablet Take 1 tablet (20 mg) by mouth daily 90 tablet 2     bisacodyl (DULCOLAX) 5 MG EC tablet Take 2 tablets at 3 pm the day before your procedure. If your procedure is before 11 am, take 2 additional tablets at 11 pm. If your procedure is after 11 am, take 2 additional tablets at 6 am. For additional instructions refer to your colonoscopy prep instructions. 4 tablet 0     citalopram (CELEXA) 20 MG tablet Take 1  tablet (20 mg) by mouth daily 30 tablet 3     hydrochlorothiazide (HYDRODIURIL) 25 MG tablet Take 1 tablet (25 mg) by mouth daily 90 tablet 1     ibuprofen (ADVIL/MOTRIN) 100 MG tablet Take 100 mg by mouth every 4 hours as needed       ketoconazole (NIZORAL) 2 % external cream Apply topically daily 30 g 1     lidocaine (LIDODERM) 5 % patch Place 1 patch onto the skin every 24 hours To prevent lidocaine toxicity, patient should be patch free for 12 hrs daily. 30 patch 0     naproxen (NAPROSYN) 125 MG/5ML suspension Take by mouth 2 times daily       nystatin (MYCOSTATIN) 872730 UNIT/GM external powder Apply topically 2 times daily 60 g 0     polyethylene glycol (GOLYTELY) 236 g suspension The night before the exam at 6 pm drink an 8-ounce glass every 15 minutes until the jug is half empty. If you arrive before 11 AM: Drink the other half of the Golytely jug at 11 PM night before procedure. If you arrive after 11 AM: Drink the other half of the Golytely jug at 6 AM day of procedure. For additional instructions refer to your colonoscopy prep instructions. 4000 mL 0       Systemic Review:   ENT/MOUTH: NEGATIVE for ear, mouth and throat problems  RESP: NEGATIVE for significant cough or SOB  CV: NEGATIVE for chest pain, palpitations or peripheral edema    Physical Examination:   Vital Signs: There were no vitals taken for this visit.  NECK: no adenopathy, no asymmetry, masses, or scars  RESP: lungs clear to auscultation - no rales, rhonchi or wheezes  CV: regular rate and rhythm, normal S1 S2, no S3 or S4, no murmur, click or rub, no peripheral edema and peripheral pulses strong  ABDOMEN: soft, nontender, no hepatosplenomegaly, no masses and bowel sounds normal  MS: no gross musculoskeletal defects noted, no edema    Plan: Appropriate to proceed as scheduled.      Liborio Tucker MD  1/6/2023    PCP:  No Ref-Primary, Physician

## 2023-01-06 NOTE — ANESTHESIA POSTPROCEDURE EVALUATION
Patient: Edin Robles    Procedure: Procedure(s):  COLONOSCOPY, WITH POLYPECTOMY AND BIOPSY       Anesthesia Type:  MAC    Note:  Disposition: Outpatient   Postop Pain Control: Uneventful            Sign Out: Well controlled pain   PONV: No   Neuro/Psych: Uneventful            Sign Out: Acceptable/Baseline neuro status   Airway/Respiratory: Uneventful            Sign Out: Acceptable/Baseline resp. status   CV/Hemodynamics: Uneventful            Sign Out: Acceptable CV status; No obvious hypovolemia; No obvious fluid overload   Other NRE: NONE   DID A NON-ROUTINE EVENT OCCUR? No           Last vitals:  Vitals Value Taken Time   /94 01/06/23 0943   Temp 36.3  C (97.4  F) 01/06/23 0943   Pulse     Resp 16 01/06/23 0943   SpO2         Electronically Signed By: Keaton Elias MD, MD  January 6, 2023  3:39 PM

## 2023-01-06 NOTE — INTERVAL H&P NOTE
"I have reviewed the surgical (or preoperative) H&P that is linked to this encounter, and examined the patient. There are no significant changes    Clinical Conditions Present on Arrival:  Clinically Significant Risk Factors Present on Admission                    # Overweight: Estimated body mass index is 28.23 kg/m  as calculated from the following:    Height as of this encounter: 1.854 m (6' 1\").    Weight as of this encounter: 97.1 kg (214 lb).       "

## 2023-01-06 NOTE — ANESTHESIA CARE TRANSFER NOTE
Patient: Edin Robles    Procedure: Procedure(s):  COLONOSCOPY, WITH POLYPECTOMY AND BIOPSY       Diagnosis: Screen for colon cancer [Z12.11]  Diagnosis Additional Information: No value filed.    Anesthesia Type:   MAC     Note:    Oropharynx: oropharynx clear of all foreign objects  Level of Consciousness: awake  Oxygen Supplementation: nasal cannula  Level of Supplemental Oxygen (L/min / FiO2): 2  Independent Airway: airway patency satisfactory and stable  Dentition: dentition unchanged  Vital Signs Stable: post-procedure vital signs reviewed and stable  Report to RN Given: handoff report given  Patient transferred to: Phase II  Comments: VSS and WNL, comfortable, no PONV, report to Abimbola RN  Handoff Report: Identifed the Patient, Identified the Reponsible Provider, Reviewed the pertinent medical history, Discussed the surgical course, Reviewed Intra-OP anesthesia mangement and issues during anesthesia, Set expectations for post-procedure period and Allowed opportunity for questions and acknowledgement of understanding      Vitals:  Vitals Value Taken Time   BP     Temp     Pulse     Resp     SpO2         Electronically Signed By: JOSSELINE Romo CRNA  January 6, 2023  9:25 AM

## 2023-01-09 LAB
PATH REPORT.COMMENTS IMP SPEC: NORMAL
PATH REPORT.FINAL DX SPEC: NORMAL
PATH REPORT.GROSS SPEC: NORMAL
PATH REPORT.MICROSCOPIC SPEC OTHER STN: NORMAL
PATH REPORT.RELEVANT HX SPEC: NORMAL
PHOTO IMAGE: NORMAL

## 2023-01-10 ENCOUNTER — PATIENT OUTREACH (OUTPATIENT)
Dept: SURGERY | Facility: CLINIC | Age: 68
End: 2023-01-10

## 2023-01-10 NOTE — TELEPHONE ENCOUNTER
Discussed appointment on 1/19 with Dr. Escobar to discuss results from his colonoscopy and further polyp resection    He will have labs and CT scan on 1/14. Patient denies any further questions .

## 2023-01-10 NOTE — TELEPHONE ENCOUNTER
Diagnosis, Referred by & from: Colon Cancer   Appt date: 1/19/2023   NOTES STATUS DETAILS   OFFICE NOTE from referring provider N/A    OFFICE NOTE from other specialist Care Everywhere / Internal [facility]:  12/5/22 - PCC OV with Dr. Small    [facility]:  8/24/20, 8/18/20 - PCC OV with Dr. Bone  12/31/15 - PCC OV with Dr. Gutenkauf [facility]:  1/25/16 - URO OV with Dr. Rivera   DISCHARGE SUMMARY from hospital N/A    DISCHARGE REPORT from the ER N/A    OPERATIVE REPORT N/A    MEDICATION LIST Internal    LABS     ANAL PAP/CEA Internal [facility]:  ([facility]) 1/14/23 - CEA   BIOPSIES/PATHOLOGY RELATED TO DIAGNOSIS Internal [facility]:  1/6/23 - Colon Biopsy (Case: [id number])   DIAGNOSTIC PROCEDURES     COLONOSCOPY Internal [facility]:  1/6/23 - Colonoscopy   IMAGING (DISC & REPORT)      CT Received / Internal [facility]:  ([facility]) 1/14/23 - CT Chest/Abd/Pelvis  12/14/22 - CT Abd/Pelvis    [facility]:  12/31/15 - CT Renal  12/29/15 - CT Abd/Pelvis   MRI Received [facility]:  9/4/20 - MRI Abdomen     Records Requested  01/10/23    Facility  [facility]  Fax: [phone]  [facility]  Fax: [phone]   Outcome * 1/10/23 1:06 PM Faxed urg req to  and  for images to be pushed to [place] PACs. - Lana    * 1/11/23 11:10 AM Images received from PN and attached to the patient in PACs. - Lana    * 1/12/23 10:43 AM Images received from  and attached to the patient in PACs. - Lana

## 2023-01-10 NOTE — PROGRESS NOTES
Colon and Rectal Surgery Clinic Note    RE: Edin Robles.  : 1955.  PAT: 2023.    Reason for visit: Discuss removal of likely malignant colon polyp.    HPI: Edin Layne is a 67 year old male who had his first screening colonoscopy with Dr. Tucker on 23. Biopsy results of polyp showed high grade dysplasia, but the polyp had central indentation and had indistinct borders so he was referred to discuss resection that is unlikely to be able to be done endoscopically. Other medical history includes hypertension, hyperlipidemia, chronic back pain, and cocaine abuse in remission.     Colonoscopy 23  Findings:        A 7 mm polyp was found in the ascending colon. The polyp was sessile. The polyp was removed with a cold snare. Resection and retrieval were        complete. A sessile non-obstructing small mass was found in the ascending colon.        The mass was non-circumferential. The mass measured one cm in length. In addition, its diameter measured twenty mm. No bleeding was present. This was biopsied with a cold jumbo forceps for histology. Area was successfully injected with 1 mL Spot (carbon black) for tattooing (proximal and adjacent to the lesion).                                                                                     Impression:     - Preparation of the colon was fair.                          - One 7 mm polyp in the ascending colon, removed with a cold snare. Resected and retrieved.                          - Likely malignant tumor in the ascending colon.                          Biopsied. Injected.           Cea (23): 3.1      Medical history:  Past Medical History:   Diagnosis Date     Depressive disorder      Hypertension        Surgical history:  Past Surgical History:   Procedure Laterality Date     COLONOSCOPY N/A 2023    Procedure: COLONOSCOPY, WITH POLYPECTOMY AND BIOPSY;  Surgeon: Liborio Tucker MD;  Location: [facility] OR       Family history:  Family  History   Problem Relation Age of Onset     Hypertension Mother      Hypertension Father      Hypertension Maternal Grandmother      Hypertension Maternal Grandfather      Hypertension Paternal Grandmother      Hypertension Paternal Grandfather      No Hx of IBD or GI malignancy    Medications:  Current Outpatient Medications   Medication Sig Dispense Refill     amLODIPine (NORVASC) 10 MG tablet Take 1 tablet (10 mg) by mouth daily 90 tablet 1     aspirin 81 MG EC tablet Take 81 mg by mouth daily       atorvastatin (LIPITOR) 20 MG tablet Take 1 tablet (20 mg) by mouth daily 90 tablet 2     bisacodyl (DULCOLAX) 5 MG EC tablet Take 2 tablets at 3 pm the day before your procedure. If your procedure is before 11 am, take 2 additional tablets at 11 pm. If your procedure is after 11 am, take 2 additional tablets at 6 am. For additional instructions refer to your colonoscopy prep instructions. 4 tablet 0     citalopram (CELEXA) 20 MG tablet Take 1 tablet (20 mg) by mouth daily 30 tablet 3     hydrochlorothiazide (HYDRODIURIL) 25 MG tablet Take 1 tablet (25 mg) by mouth daily 90 tablet 1     ibuprofen (ADVIL/MOTRIN) 100 MG tablet Take 100 mg by mouth every 4 hours as needed       ketoconazole (NIZORAL) 2 % external cream Apply topically daily 30 g 1     lidocaine (LIDODERM) 5 % patch Place 1 patch onto the skin every 24 hours To prevent lidocaine toxicity, patient should be patch free for 12 hrs daily. 30 patch 0     naproxen (NAPROSYN) 125 MG/5ML suspension Take by mouth 2 times daily       nystatin (MYCOSTATIN) 589355 UNIT/GM external powder Apply topically 2 times daily 60 g 0     polyethylene glycol (GOLYTELY) 236 g suspension The night before the exam at 6 pm drink an 8-ounce glass every 15 minutes until the jug is half empty. If you arrive before 11 AM: Drink the other half of the Golytely jug at 11 PM night before procedure. If you arrive after 11 AM: Drink the other half of the Golytely jug at 6 AM day of procedure.  For additional instructions refer to your colonoscopy prep instructions. 4000 mL 0       Allergies:  Allergies   Allergen Reactions     Other Food Allergy      PN: LW Other1: -lactose intolerant       Social history:   Social History     Tobacco Use     Smoking status: Former     Types: Cigarettes     Quit date: 2021     Years since quittin.4     Smokeless tobacco: Never   Substance Use Topics     Alcohol use: Yes     Comment: beer per day, on average     Marital status: single.    ROS:  A complete review of systems was performed with the patient and all systems negative except as per HPI.    Physical Examination:  Exam was chaperoned by Karen Fletcher CMA  /80 (BP Location: Left arm, Patient Position: Sitting, Cuff Size: Adult Regular)   Pulse 59   Ht 1.829 m (6')   Wt 96.6 kg (213 lb)   SpO2 97%   BMI 28.89 kg/m    General: Well hydrated. No acute distress.  Abdomen: Soft, NT, nondistended. No inguinal adenopathy palpated. Small umbilical hernia.   Perianal external examination:  deferred    Laboratory values reviewed:  Recent Labs   Lab Test 22  0742 22  0958 22  0756   WBC  --  8.6 7.7   HGB  --  14.9 15.0   PLT  --  224 210   CR 1.1  --  1.10   ALBUMIN  --   --  4.0   BILITOTAL  --   --  0.8   ALKPHOS  --   --  72   ALT  --   --  28   AST  --   --  21   Hgb 15/7    Imaging personally reviewed by me:  CT CAP 23:   IMPRESSION: In this patient with history of mass of the ascending colon:  1. No appreciable colonic mass. No evidence of metastatic disease in the chest, abdomen, or pelvis.  2. Dilation of the ascending aorta measuring up to 4.5 cm.  3. Other chronic and incidental findings as detailed in the body of the report:      ASSESSMENT  1. Colon polyp, likely malignant based on colonoscopy report, at least unresectable endoscopically.  2. HTN (hydrochlorothiazide, norvasc)  3. HLD (lipitor)  4. History of smoking, quit 16 months ago  5. Cocaine abuse in remission  6.  81mg aspirin daily    PLAN  1.  Laparoscopic right hemicolectomy    1. Preoperative labs: CBC, CMP, PTT/INR, Prealbumin.  2. Mechanical bowel prep with oral antibiotics.  3. Ostomy marking with WOCN.  4. Hold these medications prior to surgery: none  5. Advised patient to begin protein shakes: Premier or Pure protein given high protein, low carb ratio for pre-operative rehab.  6. Prehabilitation referral to Dr. Vizcaino.    Risks, benefits, and alternatives of operative treatment were thoroughly discussed with the patient, he understands these well and agrees to proceed.    Time spent: 45 minutes, >50% spent in discussing, counseling and coordinating care.    Nir Escobar M.D    Division of Colon and Rectal Surgery  [facility]    Referring Provider:  No referring provider defined for this encounter.     Primary Care Provider:  No Ref-Primary, Physician

## 2023-01-16 ENCOUNTER — ANCILLARY PROCEDURE (OUTPATIENT)
Dept: CT IMAGING | Facility: CLINIC | Age: 68
End: 2023-01-16
Attending: INTERNAL MEDICINE
Payer: COMMERCIAL

## 2023-01-16 PROCEDURE — 74177 CT ABD & PELVIS W/CONTRAST: CPT | Mod: GC | Performed by: RADIOLOGY

## 2023-01-16 PROCEDURE — 71260 CT THORAX DX C+: CPT | Mod: GC | Performed by: RADIOLOGY

## 2023-01-16 RX ORDER — IOPAMIDOL 755 MG/ML
125 INJECTION, SOLUTION INTRAVASCULAR ONCE
Status: COMPLETED | OUTPATIENT
Start: 2023-01-16 | End: 2023-01-16

## 2023-01-16 RX ADMIN — IOPAMIDOL 125 ML: 755 INJECTION, SOLUTION INTRAVASCULAR at 07:45

## 2023-01-19 ENCOUNTER — PRE VISIT (OUTPATIENT)
Dept: SURGERY | Facility: CLINIC | Age: 68
End: 2023-01-19

## 2023-01-19 ENCOUNTER — OFFICE VISIT (OUTPATIENT)
Dept: SURGERY | Facility: CLINIC | Age: 68
End: 2023-01-19
Payer: COMMERCIAL

## 2023-01-19 VITALS
SYSTOLIC BLOOD PRESSURE: 111 MMHG | WEIGHT: 213 LBS | HEART RATE: 59 BPM | DIASTOLIC BLOOD PRESSURE: 80 MMHG | BODY MASS INDEX: 28.85 KG/M2 | OXYGEN SATURATION: 97 % | HEIGHT: 72 IN

## 2023-01-19 DIAGNOSIS — C18.2 MALIGNANT NEOPLASM OF ASCENDING COLON (H): Primary | ICD-10-CM

## 2023-01-19 DIAGNOSIS — C18.2 MALIGNANT NEOPLASM OF ASCENDING COLON (H): ICD-10-CM

## 2023-01-19 PROCEDURE — 99204 OFFICE O/P NEW MOD 45 MIN: CPT | Performed by: SURGERY

## 2023-01-19 RX ORDER — POLYETHYLENE GLYCOL 3350 17 G/17G
238 POWDER, FOR SOLUTION ORAL SEE ADMIN INSTRUCTIONS
Qty: 21 PACKET | Refills: 0 | Status: ON HOLD | OUTPATIENT
Start: 2023-01-19 | End: 2023-03-09

## 2023-01-19 RX ORDER — METRONIDAZOLE 500 MG/1
500 TABLET ORAL EVERY 6 HOURS
Qty: 3 TABLET | Refills: 0 | Status: ON HOLD | OUTPATIENT
Start: 2023-01-19 | End: 2023-03-09

## 2023-01-19 RX ORDER — ONDANSETRON 4 MG/1
4 TABLET, FILM COATED ORAL EVERY 6 HOURS
Qty: 3 TABLET | Refills: 0 | Status: ON HOLD | OUTPATIENT
Start: 2023-01-19 | End: 2023-03-09

## 2023-01-19 RX ORDER — NEOMYCIN SULFATE 500 MG/1
1000 TABLET ORAL EVERY 6 HOURS
Qty: 6 TABLET | Refills: 0 | Status: ON HOLD | OUTPATIENT
Start: 2023-01-19 | End: 2023-03-09

## 2023-01-19 ASSESSMENT — PAIN SCALES - GENERAL: PAINLEVEL: NO PAIN (0)

## 2023-01-19 NOTE — LETTER
Date:January 19, 2023      Provider requested that no letter be sent. Do not send.       [facility]

## 2023-01-19 NOTE — NURSING NOTE
Chief Complaint   Patient presents with     Cancer     New colon cancer       Vitals:    01/19/23 0816   BP: 111/80   BP Location: Left arm   Patient Position: Sitting   Cuff Size: Adult Regular   Pulse: 59   SpO2: 97%   Weight: 213 lb   Height: 6'       Body mass index is 28.89 kg/m .    Karen Fletcher CMA

## 2023-01-19 NOTE — LETTER
2023       RE: Edin Robles  [address] [facility] [address]     Dear Colleague,    Thank you for referring your patient, Edin Robles, to the [facility] COLON AND RECTAL SURGERY CLINIC [place] at [facility]. Please see a copy of my visit note below.    Colon and Rectal Surgery Clinic Note    RE: Edin Robles.  : 1955.  PAT: 2023.    Reason for visit: Discuss removal of likely malignant colon polyp.    HPI: Edin Layne is a 67 year old male who had his first screening colonoscopy with Dr. Tucker on 23. Biopsy results of polyp showed high grade dysplasia, but the polyp had central indentation and had indistinct borders so he was referred to discuss resection that is unlikely to be able to be done endoscopically. Other medical history includes hypertension, hyperlipidemia, chronic back pain, and cocaine abuse in remission.     Colonoscopy 23  Findings:        A 7 mm polyp was found in the ascending colon. The polyp was sessile. The polyp was removed with a cold snare. Resection and retrieval were        complete. A sessile non-obstructing small mass was found in the ascending colon.        The mass was non-circumferential. The mass measured one cm in length. In addition, its diameter measured twenty mm. No bleeding was present. This was biopsied with a cold jumbo forceps for histology. Area was successfully injected with 1 mL Spot (carbon black) for tattooing (proximal and adjacent to the lesion).                                                                                     Impression:     - Preparation of the colon was fair.                          - One 7 mm polyp in the ascending colon, removed with a cold snare. Resected and retrieved.                          - Likely malignant tumor in the ascending colon.                          Biopsied. Injected.           Cea (23): 3.1      Medical  history:  Past Medical History:   Diagnosis Date     Depressive disorder 1995     Hypertension        Surgical history:  Past Surgical History:   Procedure Laterality Date     COLONOSCOPY N/A 1/6/2023    Procedure: COLONOSCOPY, WITH POLYPECTOMY AND BIOPSY;  Surgeon: Liborio Tucker MD;  Location: [facility] OR       Family history:  Family History   Problem Relation Age of Onset     Hypertension Mother      Hypertension Father      Hypertension Maternal Grandmother      Hypertension Maternal Grandfather      Hypertension Paternal Grandmother      Hypertension Paternal Grandfather      No Hx of IBD or GI malignancy    Medications:  Current Outpatient Medications   Medication Sig Dispense Refill     amLODIPine (NORVASC) 10 MG tablet Take 1 tablet (10 mg) by mouth daily 90 tablet 1     aspirin 81 MG EC tablet Take 81 mg by mouth daily       atorvastatin (LIPITOR) 20 MG tablet Take 1 tablet (20 mg) by mouth daily 90 tablet 2     bisacodyl (DULCOLAX) 5 MG EC tablet Take 2 tablets at 3 pm the day before your procedure. If your procedure is before 11 am, take 2 additional tablets at 11 pm. If your procedure is after 11 am, take 2 additional tablets at 6 am. For additional instructions refer to your colonoscopy prep instructions. 4 tablet 0     citalopram (CELEXA) 20 MG tablet Take 1 tablet (20 mg) by mouth daily 30 tablet 3     hydrochlorothiazide (HYDRODIURIL) 25 MG tablet Take 1 tablet (25 mg) by mouth daily 90 tablet 1     ibuprofen (ADVIL/MOTRIN) 100 MG tablet Take 100 mg by mouth every 4 hours as needed       ketoconazole (NIZORAL) 2 % external cream Apply topically daily 30 g 1     lidocaine (LIDODERM) 5 % patch Place 1 patch onto the skin every 24 hours To prevent lidocaine toxicity, patient should be patch free for 12 hrs daily. 30 patch 0     naproxen (NAPROSYN) 125 MG/5ML suspension Take by mouth 2 times daily       nystatin (MYCOSTATIN) 025786 UNIT/GM external powder Apply topically 2 times daily 60 g 0      polyethylene glycol (GOLYTELY) 236 g suspension The night before the exam at 6 pm drink an 8-ounce glass every 15 minutes until the jug is half empty. If you arrive before 11 AM: Drink the other half of the Golytely jug at 11 PM night before procedure. If you arrive after 11 AM: Drink the other half of the Golytely jug at 6 AM day of procedure. For additional instructions refer to your colonoscopy prep instructions. 4000 mL 0       Allergies:  Allergies   Allergen Reactions     Other Food Allergy      PN: LW Other1: -lactose intolerant       Social history:   Social History     Tobacco Use     Smoking status: Former     Types: Cigarettes     Quit date: 2021     Years since quittin.4     Smokeless tobacco: Never   Substance Use Topics     Alcohol use: Yes     Comment: beer per day, on average     Marital status: single.    ROS:  A complete review of systems was performed with the patient and all systems negative except as per HPI.    Physical Examination:  Exam was chaperoned by Karen Fletcher CMA  /80 (BP Location: Left arm, Patient Position: Sitting, Cuff Size: Adult Regular)   Pulse 59   Ht 1.829 m (6')   Wt 96.6 kg (213 lb)   SpO2 97%   BMI 28.89 kg/m    General: Well hydrated. No acute distress.  Abdomen: Soft, NT, nondistended. No inguinal adenopathy palpated. Small umbilical hernia.   Perianal external examination:  deferred    Laboratory values reviewed:  Recent Labs   Lab Test 22  0742 22  0958 22  0756   WBC  --  8.6 7.7   HGB  --  14.9 15.0   PLT  --  224 210   CR 1.1  --  1.10   ALBUMIN  --   --  4.0   BILITOTAL  --   --  0.8   ALKPHOS  --   --  72   ALT  --   --  28   AST  --   --  21   Hgb 15/7    Imaging personally reviewed by me:  CT CAP 23:   IMPRESSION: In this patient with history of mass of the ascending colon:  1. No appreciable colonic mass. No evidence of metastatic disease in the chest, abdomen, or pelvis.  2. Dilation of the ascending aorta measuring  up to 4.5 cm.  3. Other chronic and incidental findings as detailed in the body of the report:      ASSESSMENT  1. Colon polyp, likely malignant based on colonoscopy report, at least unresectable endoscopically.  2. HTN (hydrochlorothiazide, norvasc)  3. HLD (lipitor)  4. History of smoking, quit 16 months ago  5. Cocaine abuse in remission  6. 81mg aspirin daily    PLAN  1.  Laparoscopic right hemicolectomy    1. Preoperative labs: CBC, CMP, PTT/INR, Prealbumin.  2. Mechanical bowel prep with oral antibiotics.  3. Ostomy marking with WOCN.  4. Hold these medications prior to surgery: none  5. Advised patient to begin protein shakes: Premier or Pure protein given high protein, low carb ratio for pre-operative rehab.  6. Prehabilitation referral to Dr. Vizcaino.    Risks, benefits, and alternatives of operative treatment were thoroughly discussed with the patient, he understands these well and agrees to proceed.    Time spent: 45 minutes, >50% spent in discussing, counseling and coordinating care.    Nir Escobar M.D    Division of Colon and Rectal Surgery  [facility]    Referring Provider:  No referring provider defined for this encounter.     Primary Care Provider:  No Ref-Primary, Physician      Again, thank you for allowing me to participate in the care of your patient.      Sincerely,    Nir Escobar MD, MD

## 2023-01-19 NOTE — PATIENT INSTRUCTIONS
"Follow up:  Schedule surgery with Jazmin at [phone]  Pre op physical, full bowel prep with antibiotics, wound ostomy nurse visit for marking  \"Pre-hab\" with Dr. Vizcaino      Please call with any questions or concerns regarding your clinic visit today.    Schedule Clinic Appointments [phone] # 1   M-F 7:30 - 5 pm    VIRAJ Salgado [phone]    Clinic Fax Number [phone]    Surgery Scheduling [phone]      My Chart is available 24 hours a day and is a secure way to access your records and communicate with your care team.  I strongly recommend signing up if you haven't already done so, if you are comfortable with computers.  If you would like to inquire about this or are having problems with My Chart access, you may call [phone] or go online at [email or web address].  Please allow at least 24 hours for a response and extra time on weekends and Holidays.    "

## 2023-01-26 ENCOUNTER — TELEPHONE (OUTPATIENT)
Dept: SURGERY | Facility: CLINIC | Age: 68
End: 2023-01-26
Payer: COMMERCIAL

## 2023-02-23 ENCOUNTER — TELEPHONE (OUTPATIENT)
Dept: FAMILY MEDICINE | Facility: CLINIC | Age: 68
End: 2023-02-23

## 2023-02-23 NOTE — TELEPHONE ENCOUNTER
Pt called into clinic asking to schedule an appt with Dr. Small for a Pre-Op. His DOS is 3/8 and pt is having HEMICOLECTOMY, RIGHT, LAPAROSCOPY at  [facility]    Since it was for a pre-op and pt surgery is soon I scheduled pt on 3/2 for 40 minutes in a same day slot. Please let me know if this is ok or if you would prefer I schedule patient on a diff day/time.    Josette David, CMA

## 2023-02-24 ENCOUNTER — TEAM CONFERENCE (OUTPATIENT)
Dept: SURGERY | Facility: CLINIC | Age: 68
End: 2023-02-24

## 2023-02-24 NOTE — TELEPHONE ENCOUNTER
FUTURE VISIT INFORMATION      SURGERY INFORMATION:    Date: 3/8/23    Location: [facility] or    Surgeon:  Nir Escobar MD    Anesthesia Type:  general    Procedure: HEMICOLECTOMY, RIGHT, LAPAROSCOPY-ASSISTED    RECORDS REQUESTED FROM:       Primary Care Provider:    Jessica Small MD- [facility]       Pertinent Medical History: hypertension    Most recent EKG+ Tracing: 10/8/20    Most recent Cardiac Stress Test: 10/6/14- [facility]

## 2023-02-24 NOTE — CONFIDENTIAL NOTE
COLON AND RECTAL SURGERY HUDDLE:    Patient was reviewed in preporation for their surgery the following was reviewed and has been completed:    Surgeon: Dr. Nir Escobar    Surgery & Date: 3/8 HEMICOLECTOMY, RIGHT, LAPAROSCOPY-ASSISTED     Last MD Note: reviewed    Anesthesia Type: General    Other Providers: No    PAC: Yes 3/3    WOC:NEED    Labs: Yes -NEED    Bowel Prep: Yes MiraLAX / Gatorade  and Antibiotic    Packet: Yes NEED    Imaging: No    Post-Op Appointments: Yes    COVID: N/A    Pre op soap: Yes Questions about shower: no    Is patient on TPN?: No   If yes, I contacted the TPN pharmacist by paging the  pharmacy at [phone] or calling [phone]. I also contacted Rena SANDOVAL with inpatient colon and rectal team.     Pre op call complete: Yes

## 2023-02-24 NOTE — TELEPHONE ENCOUNTER
"Spoke with patient via phone, completed/ confirmed the following scheduling, then sent Surgery Packet to patient via MyChart and Mail including related scheduling information and prep instructions:    \"ALEXA Atkinson- Dr. Escobar said that you do not need to see the Ostomy Nurse, so I have canceled that appointment.     Thank-you,    Jazmin   Justyna-op Coordinator  [place]  Direct Phone: [phone]  ---------------------------------------    BEFORE YOUR SURGERY     Required: Yes, you will need a  18 years or older to drive you home from your procedure as well as stay with you for 24 hours after your procedure, if you are discharged the same day as your procedure.    Surgery: HEMICOLECTOMY, RIGHT, LAPAROSCOPY-ASSISTED     Date: Wednesday March 8, 2023  Surgeon: Dr. Nir Escobar    Time: You will receive a call 1-3 days prior to your surgery with your finalized surgery and arrival time.     Location:     [address])      [place] [address]      [phone]      [email or web address]     Upcoming Related Appointments:     Mar 01, 2023  4:45 PM  (Arrive by 4:30 PM)  PAC EVALUATION with Bren Nicole PA-C  [facility] )   [phone]    VIDEO VISIT     Mar 07, 2023 11:45 AM  LAB with [facility]  [facility] [place] ([facility] )   [phone]    [address] [facility] [address]     Mar 29, 2023 10:30 AM  (Arrive by 10:15 AM)  Post Op with Kari Cordova PA-C  [facility] )   [phone]    [address] [facility] [address]         May 04, 2023  3:30 PM  (Arrive by 3:15 PM)  Post Op with Nir Escobar, " "MD  [facility] Colon and Rectal Surgery Clinic [place] ([facility] ) [phone]    [address] [place] [address]     Pre-surgical Preparation:    MiraLAX/Gatorade, Antibiotics, Zofran  - see \"Day Before Surgery\"   - obtain medications and ingredients in advance    Please go to your local pharmacy or store and purchase:   - TWO 8.3oz (238g) bottles of Miralax (Powder)   - 96 oz of Gatorade (no red or purple)\"    Jazmin Galvan  Justyna-op Coordinator  [place]  Direct Phone: [phone]        "

## 2023-03-01 ENCOUNTER — PRE VISIT (OUTPATIENT)
Dept: SURGERY | Facility: CLINIC | Age: 68
End: 2023-03-01

## 2023-03-01 ENCOUNTER — ANESTHESIA EVENT (OUTPATIENT)
Dept: SURGERY | Facility: CLINIC | Age: 68
DRG: 331 | End: 2023-03-01
Payer: COMMERCIAL

## 2023-03-01 ENCOUNTER — VIRTUAL VISIT (OUTPATIENT)
Dept: SURGERY | Facility: CLINIC | Age: 68
End: 2023-03-01
Payer: COMMERCIAL

## 2023-03-01 DIAGNOSIS — Z01.818 PRE-OP EVALUATION: Primary | ICD-10-CM

## 2023-03-01 PROCEDURE — 99203 OFFICE O/P NEW LOW 30 MIN: CPT | Mod: VID | Performed by: PHYSICIAN ASSISTANT

## 2023-03-01 RX ORDER — AMOXICILLIN 500 MG
1200 CAPSULE ORAL EVERY MORNING
COMMUNITY

## 2023-03-01 ASSESSMENT — LIFESTYLE VARIABLES: TOBACCO_USE: 1

## 2023-03-01 ASSESSMENT — ENCOUNTER SYMPTOMS: SEIZURES: 0

## 2023-03-01 NOTE — H&P (VIEW-ONLY)
Pre-Operative H & P     CC:  Preoperative exam to assess for increased cardiopulmonary risk while undergoing surgery and anesthesia.    Date of Encounter: 3/1/2023  Primary Care Physician:  No Ref-Primary, Physician     Reason for visit:   Encounter Diagnosis   Name Primary?     Pre-op evaluation Yes       HPI  Edin Robles is a 67 year old male who presents for pre-operative H & P in preparation for  Procedure Information     Case: [id number] Date/Time: 03/08/23 0730    Procedure: HEMICOLECTOMY, RIGHT, LAPAROSCOPY-ASSISTED (Abdomen)    Anesthesia type: General    Diagnosis: Malignant neoplasm of ascending colon (H) [C18.2]    Pre-op diagnosis: Malignant neoplasm of ascending colon (H) [C18.2]    Location:  OR  /  OR    Providers: Nir Escobar MD          Patient is being evaluated for comorbid conditions of hypertension, dyslipidemia, former tobacco use, depression    Mr. Robles recently underwent colonoscopy and was found to have a likely malignant colon polyp. He was seen by Dr. Escobar and is now scheduled for the above procedure.     History is obtained from the patient and chart review    Hx of abnormal bleeding or anti-platelet use: denies      Past Medical History  Past Medical History:   Diagnosis Date     Depressive disorder 1995     Hypertension        Past Surgical History  Past Surgical History:   Procedure Laterality Date     COLONOSCOPY N/A 01/06/2023    Procedure: COLONOSCOPY, WITH POLYPECTOMY AND BIOPSY;  Surgeon: Liborio Tucker MD;  [place] [facility] [place]       EYE SURGERY         Prior to Admission Medications  Current Outpatient Medications   Medication Sig Dispense Refill     amLODIPine (NORVASC) 10 MG tablet Take 1 tablet (10 mg) by mouth daily (Patient taking differently: Take 10 mg by mouth every morning) 90 tablet 1     atorvastatin (LIPITOR) 20 MG tablet Take 1 tablet (20 mg) by mouth daily (Patient taking differently: Take 20 mg by mouth every morning) 90  tablet 2     Bioflavonoid Products (VITAMIN C) CHEW Take by mouth every morning       calcium carbonate-vitamin D (OSCAL) 250-3.125 MG-MCG TABS per tablet Take 1 tablet by mouth every morning       citalopram (CELEXA) 20 MG tablet Take 1 tablet (20 mg) by mouth daily (Patient taking differently: Take 20 mg by mouth every morning) 30 tablet 3     hydrochlorothiazide (HYDRODIURIL) 25 MG tablet Take 1 tablet (25 mg) by mouth daily (Patient taking differently: Take 25 mg by mouth every morning) 90 tablet 1     ibuprofen (ADVIL/MOTRIN) 100 MG tablet Take 100 mg by mouth every 4 hours as needed       lidocaine (LIDODERM) 5 % patch Place 1 patch onto the skin every 24 hours To prevent lidocaine toxicity, patient should be patch free for 12 hrs daily. (Patient taking differently: Place 1 patch onto the skin as needed To prevent lidocaine toxicity, patient should be patch free for 12 hrs daily.) 30 patch 0     Moringa Oleifera (MORINGA PO) Take by mouth every morning       Omega-3 Fatty Acids (FISH OIL) 1200 MG capsule Take 1,200 mg by mouth every morning       TURMERIC PO Take by mouth every morning       UNABLE TO FIND every morning MEDICATION NAME: Neem       vitamin B complex with vitamin C (VITAMIN  B COMPLEX) tablet Take 1 tablet by mouth every morning       aspirin 81 MG EC tablet Take 81 mg by mouth as needed       bisacodyl (DULCOLAX) 5 MG EC tablet Take 2 tablets at 3 pm the day before your procedure. If your procedure is before 11 am, take 2 additional tablets at 11 pm. If your procedure is after 11 am, take 2 additional tablets at 6 am. For additional instructions refer to your colonoscopy prep instructions. 4 tablet 0     ketoconazole (NIZORAL) 2 % external cream Apply topically daily 30 g 1     metroNIDAZOLE (FLAGYL) 500 MG tablet Take 1 tablet (500 mg) by mouth every 6 hours At 8:00 am, 2:00 pm, 8:00 pm the day prior to your surgery with neomycin and zofran. 3 tablet 0     naproxen (NAPROSYN) 125 MG/5ML  "suspension Take by mouth 2 times daily (Patient not taking: Reported on 3/1/2023)       neomycin (MYCIFRADIN) 500 MG tablet Take 2 tablets (1,000 mg) by mouth every 6 hours At 8:00 am, 2:00 pm, 8:00 pm the day prior to your surgery with flagyl and zofran. 6 tablet 0     nystatin (MYCOSTATIN) 118743 UNIT/GM external powder Apply topically 2 times daily 60 g 0     ondansetron (ZOFRAN) 4 MG tablet Take 1 tablet (4 mg) by mouth every 6 hours At 8:00 am, 2:00 pm, 8:00 pm the day prior to your surgery with neomycin and flagyl. 3 tablet 0     polyethylene glycol (GOLYTELY) 236 g suspension The night before the exam at 6 pm drink an 8-ounce glass every 15 minutes until the jug is half empty. If you arrive before 11 AM: Drink the other half of the Golytely jug at 11 PM night before procedure. If you arrive after 11 AM: Drink the other half of the Golytely jug at 6 AM day of procedure. For additional instructions refer to your colonoscopy prep instructions. 4000 mL 0     polyethylene glycol (MIRALAX) 17 g packet Take 238 g by mouth See Admin Instructions Starting at 4 pm night prior to surgery. Refer to \"Getting Ready for Surgery\" instructions. 21 packet 0       Allergies  Allergies   Allergen Reactions     Other Food Allergy      PN: LW Other1: -lactose intolerant       Social History  Social History     Socioeconomic History     Marital status: Single     Spouse name: Not on file     Number of children: Not on file     Years of education: Not on file     Highest education level: Not on file   Occupational History     Not on file   Tobacco Use     Smoking status: Former     Types: Cigarettes     Quit date: 2021     Years since quittin.5     Smokeless tobacco: Never   Vaping Use     Vaping Use: Never used   Substance and Sexual Activity     Alcohol use: Yes     Comment: beer per day, on average     Drug use: Yes     Types: Marijuana     Comment: smoking prn     Sexual activity: Not Currently     Partners: Female "   Other Topics Concern     Parent/sibling w/ CABG, MI or angioplasty before 65F 55M? No   Social History Narrative     Not on file     Social Determinants of Health     Financial Resource Strain: Not on file   Food Insecurity: Not on file   Transportation Needs: Not on file   Physical Activity: Not on file   Stress: Not on file   Social Connections: Not on file   Intimate Partner Violence: Not on file   Housing Stability: Not on file       Family History  Family History   Problem Relation Age of Onset     Hypertension Mother      Hypertension Father      Hypertension Maternal Grandmother      Hypertension Maternal Grandfather      Hypertension Paternal Grandmother      Hypertension Paternal Grandfather      Anesthesia Reaction No family hx of      Deep Vein Thrombosis (DVT) No family hx of        Review of Systems  The complete review of systems is negative other than noted in the HPI or here.   Anesthesia Evaluation   Pt has had prior anesthetic.     No history of anesthetic complications       ROS/MED HX  ENT/Pulmonary:     (+) sleep apnea, doesn't use CPAP, tobacco use, Past use,     Neurologic:  - neg neurologic ROS  (-) no seizures and no CVA   Cardiovascular:     (+) Dyslipidemia hypertension-----Previous cardiac testing   Echo: Date: Results:    Stress Test: Date: 2014 Results:  Normal exercise stress test  ECG Reviewed: Date: 10/2020 Results:  Sinus bradycardia, non specific T abn  Cath: Date: Results:   (-) taking anticoagulants/antiplatelets   METS/Exercise Tolerance: >4 METS Comment: Reports 15,000-16,000 steps daily. He states he does have longstanding history of SOLOMON with ascending stairs.    Hematologic:  - neg hematologic  ROS  (-) history of blood clots and history of blood transfusion   Musculoskeletal:  - neg musculoskeletal ROS     GI/Hepatic: Comment: Likely malignant colon polyp   (-) GERD   Renal/Genitourinary:  - neg Renal ROS     Endo:  - neg endo ROS  (-) chronic steroid usage    Psychiatric/Substance Use:     (+) psychiatric history depression     Infectious Disease:  - neg infectious disease ROS     Malignancy:       Other:            Virtual visit -  No vitals were obtained    Physical Exam  Constitutional: Awake, alert, cooperative, no apparent distress, and appears stated age.  HENT: Normocephalic  Respiratory: non labored breathing   Neurologic: Awake, alert, oriented to name, place and time.   Neuropsychiatric: Calm, cooperative. Normal affect.      Prior Labs/Diagnostic Studies   All labs and imaging personally reviewed     EKG/ stress test - if available please see in ROS above   No results found.  No flowsheet data found.      The patient's records and results personally reviewed by this provider.     Outside records reviewed from: Care Everywhere      Assessment      Edin Robles is a 67 year old male seen as a PAC referral for risk assessment and optimization for anesthesia.    Plan/Recommendations  Pt will be optimized for the proposed procedure.  See below for details on the assessment, risk, and preoperative recommendations    NEUROLOGY  - No history of TIA, CVA or seizure  -Post Op delirium risk factors:  No risk identified    ENT  - No current airway concerns.  Will need to be reassessed day of surgery.  Mallampati: Unable to assess  TM: Unable to assess    CARDIAC  -hypertension using norvasc and hctz  -dyslipidemia using lipitor  -Patient reports he is active and walks 16,000 steps most days. He endorses longstanding SOLOMON with ascending stairs.   - METS (Metabolic Equivalents)  Patient performs 4 or more METS exercise without symptoms            Total Score: 0      RCRI-Low risk: Class 2 0.9% complication rate            Total Score: 1    RCRI: High Risk Surgery        PULMONARY  - Obstructive Sleep Apnea  CHELITA without home CPAP use.  - Denies asthma or inhaler use   - Tobacco History      History   Smoking Status     Former     Types: Cigarettes     Quit date:  8/1/2021   Smokeless Tobacco     Never       GI  -possible colon malignancy with the above procedure planned   PONV Medium Risk  Total Score: 2           1 AN PONV: Patient is not a current smoker    1 AN PONV: Intended Post Op Opioids        /RENAL  - Baseline Creatinine WNL    ENDOCRINE    - BMI: Estimated body mass index is 28.89 kg/m  as calculated from the following:    Height as of 1/19/23: 1.829 m (6').    Weight as of 1/19/23: 96.6 kg (213 lb).  Overweight (BMI 25.0-29.9)  - No history of Diabetes Mellitus    HEME  VTE Low Risk 0.5%            Total Score: 3    VTE: Greater than 59 yrs old    VTE: Male      - Platelet disfunction second to Aspirin (Jose De Jesus, many others)  -will update T&S prior to surgery    Different anesthesia methods/types have been discussed with the patient, but they are aware that the final plan will be decided by the assigned anesthesia provider on the date of service.  Patient was discussed with Dr. Schumacher    The patient is optimized for their procedure. AVS with information on surgery time/arrival time, meds and NPO status given by nursing staff. No further diagnostic testing indicated.    Please refer to the physical examination documented by the anesthesiologist in the anesthesia record on the day of surgery.    Video-Visit Details    Type of service:  Video Visit    Provider received verbal consent for a Video Visit from the patient? Yes     Originating Location (pt. Location): Home    Distant Location (provider location):  Off-site  Mode of Communication:  Video Conference via [employer]  On the day of service:     Prep time: 7 minutes  Visit time: 10 minutes  Documentation time: 9 minutes  ------------------------------------------  Total time: 26 minutes      Bren Nicole PA-C  Preoperative Assessment Center  [facility]  Cl[facility] and [facility] Center  Phone: [phone]  Fax: [phone]

## 2023-03-01 NOTE — PROGRESS NOTES
Edin is a 67 year old who is being evaluated via a billable video visit.      How would you like to obtain your AVS? Stephaniehart            Subjective   Edin is a 67 year old; presenting for the following health issues:  Pre-Op Exam      HPI         Review of Systems       Physical Exam       JARROD Santiago LPN

## 2023-03-01 NOTE — PATIENT INSTRUCTIONS
Preparing for Your Surgery      Name:  Edin Robles   MRN:  [id number]   :  1955   Today's Date:  3/1/2023       Arriving for surgery:  Surgery date:  3/8/23  Arrival time:  05:30 am     Surgeries and procedures: Adult patients can have 2 visitors all through the surgery process.     Visiting hours: 8 a.m. to 8:30 p.m.     Hospital: Adult patients and children under age 18 can have 4 visitor at a time     No visitors under the age of 5 are allowed for hospital patients.  Double occupancy rooms: Patients can have only two visitors at a time.     Patients with disabilities: Can have a support person with them (family member, service provider     Or someone well informed about their needs) plus the allowed number of visitors     Patients confirmed or suspected to have symptoms of COVID 19 or flu:     No visitors allowed for adult patients.   Children (under age 18) can have 1 named visitor.     People who are sick or showing symptoms of COVID 19 or flu:    Are not allowed to visit patients--we can only make exceptions in special situations.       Please follow these guidelines for your visit:   Arrive wearing a mask over your mouth and nose; we will give you a medical mask to wear    If you arrive wearing a cloth mask.   Keep it on during your entire visit, even when in patient's room.   If you don't wear a mask we'll ask you to leave.     Clean your hands with alcohol hand . Do this when you arrive at and leave the building and patient room,    And again after you touch your mask or anything in the room.     You can t visit if you have a fever, cough, shortness of breath, muscle aches, headaches, sore throat    Or diarrhea      Stay 6 feet away from others during your visit and between visits     Go directly to and from the room you are visiting.     Stay in the patient s room during your visit. Limit going to other places in the hospital as much as possible     Leave bags and jackets at home or  in the car.     For everyone s health, please don t come and go during your visit. That includes for smoking   during your visit.     Please come to:     [facility] [address]  [address] [place]  [address]    - ? parking is available in front of the hospital      -    Please proceed to [address] on the 3rd floor. [phone]?     - ?If you are in need of directions, wheelchair or escort please stop at the Information Desk in the lobby.  Inform the information person that you are here for surgery; a wheelchair and escort to [address] will be provided.?     What can I eat or drink?  -  You may eat per bowel prep instructions from the surgeon.  -  You may have clear liquids until 2 hours prior to arrival time.     Examples of clear liquids:  Water  Clear broth  Juices (apple, white grape, white cranberry  and cider) without pulp  Noncarbonated, powder based beverages  (lemonade and Juvenal-Aid)  Sodas (Sprite, 7-Up, ginger ale and seltzer)  Coffee or tea (without milk or cream)  Gatorade    -  No Alcohol for at least 24 hours before surgery.     Which medicines can I take?  Hold Aspirin for 7 days before surgery.   Hold Multivitamins for 7 days before surgery.  Hold Supplements for 7 days before surgery.  Hold Ibuprofen (Advil, Motrin) for 1 day(s) before surgery--unless otherwise directed by surgeon.  Hold Naproxen (Aleve) for 4 days before surgery.    -  DO NOT take these medications the day of surgery:  Hydrodiuril (hydrochlorothiazide).  -  PLEASE TAKE these medications the day of surgery:  Tylenol if needed; take other morning medications.    How do I prepare myself?  - Please take 2 showers (one the night prior to surgery and one the morning of surgery) using Scrubcare or Hibiclens soap.    Use this soap only from the neck to your toes.     Leave the soap on your skin for one minute--then rinse thoroughly.      You may use your own shampoo and  conditioner. No other hair products.   - Please remove all jewelry and body piercings.  - No lotions, deodorants or fragrance.  - No makeup or fingernail polish.   - Bring your ID and insurance card.    -If you have a Deep Brain Stimulator, Spinal Cord Stimulator, or any Neuro Stimulator device---you must bring the remote control to the hospital.      ALL PATIENTS GOING HOME THE SAME DAY OF SURGERY ARE REQUIRED TO HAVE A RESPONSIBLE ADULT TO DRIVE AND BE IN ATTENDANCE WITH THEM FOR 24 HOURS FOLLOWING SURGERY.    Covid testing policy as of 12/06/2022  Your surgeon will notify and schedule you for a COVID test if one is needed before surgery--please direct any questions or COVID symptoms to your surgeon      Questions or Concerns:    - For any questions regarding the day of surgery or your hospital stay, please contact the Pre Admission Nursing Office at [phone].       - If you have health changes between today and your surgery, please call your surgeon.       - For questions after surgery, please call your surgeons office.     Enhanced Recovery After Surgery     This is a team effort, including you, to get you back on your feet, eating and drinking      normally and out of the hospital as quickly as possible.  The goals are: 1) NO INFECTIONS and   2) RETURN TO NORMAL DIET    How can we achieve these goals?  1) STAY ACTIVE: Walk every day before your surgery; try to increase the amount every day.  Walk after surgery as much as you can-the nurses will help you.  Walking speeds healing and gets you home quicker, you heal better at home and have less risk of infection.   2) STAY HYDRATED: Drink clear liquids up until 2 hours prior to arrival. We would like you to purchase a drink such as Gatorade or Ensure Clear (not the milkshake type).  Drink this before bedtime and the morning of surgery, drink between 8-10 ounces or until you feel hydrated.  Keeping well hydrated leads to your veins being plump, you wake up  faster, and you are less likely to be nauseated. Start drinking water as soon as you can after surgery and advance to clear liquids and food as tolerated.  IV fluids contain salt, drinking fluids will minimize the amount of IV fluids you need and decrease the amount of salt you get.    The most common reason for the patient to be readmitted is dehydration. Staying hydrated after you go home from the hospital is very important.  Ensure or Ensure Clear are good options to keep you hydrated.     3) PAIN MANAGEMENT: If we minimize the amount of opioids and narcotics, and use regional blocks (which numb the area where your surgery is) along with oral pain medications; you will have less side effects of nausea and constipation. Narcotics can slow down your bowels and cause you to stay in the hospital longer.     Our goal is to keep you comfortable; eating and drinking normally and back home safely.

## 2023-03-01 NOTE — H&P
Pre-Operative H & P     CC:  Preoperative exam to assess for increased cardiopulmonary risk while undergoing surgery and anesthesia.    Date of Encounter: 3/1/2023  Primary Care Physician:  No Ref-Primary, Physician     Reason for visit:   Encounter Diagnosis   Name Primary?     Pre-op evaluation Yes       HPI  Edin Robles is a 67 year old male who presents for pre-operative H & P in preparation for  Procedure Information     Case: [id number] Date/Time: 03/08/23 0730    Procedure: HEMICOLECTOMY, RIGHT, LAPAROSCOPY-ASSISTED (Abdomen)    Anesthesia type: General    Diagnosis: Malignant neoplasm of ascending colon (H) [C18.2]    Pre-op diagnosis: Malignant neoplasm of ascending colon (H) [C18.2]    Location:  OR  /  OR    Providers: Nir Escobar MD          Patient is being evaluated for comorbid conditions of hypertension, dyslipidemia, former tobacco use, depression    Mr. Robles recently underwent colonoscopy and was found to have a likely malignant colon polyp. He was seen by Dr. Escobar and is now scheduled for the above procedure.     History is obtained from the patient and chart review    Hx of abnormal bleeding or anti-platelet use: denies      Past Medical History  Past Medical History:   Diagnosis Date     Depressive disorder 1995     Hypertension        Past Surgical History  Past Surgical History:   Procedure Laterality Date     COLONOSCOPY N/A 01/06/2023    Procedure: COLONOSCOPY, WITH POLYPECTOMY AND BIOPSY;  Surgeon: Liborio Tukcer MD;  Location: [facility] [place]     COLONOSCOPY       EYE SURGERY         Prior to Admission Medications  Current Outpatient Medications   Medication Sig Dispense Refill     amLODIPine (NORVASC) 10 MG tablet Take 1 tablet (10 mg) by mouth daily (Patient taking differently: Take 10 mg by mouth every morning) 90 tablet 1     atorvastatin (LIPITOR) 20 MG tablet Take 1 tablet (20 mg) by mouth daily (Patient taking differently: Take 20 mg by mouth every morning) 90  tablet 2     Bioflavonoid Products (VITAMIN C) CHEW Take by mouth every morning       calcium carbonate-vitamin D (OSCAL) 250-3.125 MG-MCG TABS per tablet Take 1 tablet by mouth every morning       citalopram (CELEXA) 20 MG tablet Take 1 tablet (20 mg) by mouth daily (Patient taking differently: Take 20 mg by mouth every morning) 30 tablet 3     hydrochlorothiazide (HYDRODIURIL) 25 MG tablet Take 1 tablet (25 mg) by mouth daily (Patient taking differently: Take 25 mg by mouth every morning) 90 tablet 1     ibuprofen (ADVIL/MOTRIN) 100 MG tablet Take 100 mg by mouth every 4 hours as needed       lidocaine (LIDODERM) 5 % patch Place 1 patch onto the skin every 24 hours To prevent lidocaine toxicity, patient should be patch free for 12 hrs daily. (Patient taking differently: Place 1 patch onto the skin as needed To prevent lidocaine toxicity, patient should be patch free for 12 hrs daily.) 30 patch 0     Moringa Oleifera (MORINGA PO) Take by mouth every morning       Omega-3 Fatty Acids (FISH OIL) 1200 MG capsule Take 1,200 mg by mouth every morning       TURMERIC PO Take by mouth every morning       UNABLE TO FIND every morning MEDICATION NAME: Neem       vitamin B complex with vitamin C (VITAMIN  B COMPLEX) tablet Take 1 tablet by mouth every morning       aspirin 81 MG EC tablet Take 81 mg by mouth as needed       bisacodyl (DULCOLAX) 5 MG EC tablet Take 2 tablets at 3 pm the day before your procedure. If your procedure is before 11 am, take 2 additional tablets at 11 pm. If your procedure is after 11 am, take 2 additional tablets at 6 am. For additional instructions refer to your colonoscopy prep instructions. 4 tablet 0     ketoconazole (NIZORAL) 2 % external cream Apply topically daily 30 g 1     metroNIDAZOLE (FLAGYL) 500 MG tablet Take 1 tablet (500 mg) by mouth every 6 hours At 8:00 am, 2:00 pm, 8:00 pm the day prior to your surgery with neomycin and zofran. 3 tablet 0     naproxen (NAPROSYN) 125 MG/5ML  "suspension Take by mouth 2 times daily (Patient not taking: Reported on 3/1/2023)       neomycin (MYCIFRADIN) 500 MG tablet Take 2 tablets (1,000 mg) by mouth every 6 hours At 8:00 am, 2:00 pm, 8:00 pm the day prior to your surgery with flagyl and zofran. 6 tablet 0     nystatin (MYCOSTATIN) 444460 UNIT/GM external powder Apply topically 2 times daily 60 g 0     ondansetron (ZOFRAN) 4 MG tablet Take 1 tablet (4 mg) by mouth every 6 hours At 8:00 am, 2:00 pm, 8:00 pm the day prior to your surgery with neomycin and flagyl. 3 tablet 0     polyethylene glycol (GOLYTELY) 236 g suspension The night before the exam at 6 pm drink an 8-ounce glass every 15 minutes until the jug is half empty. If you arrive before 11 AM: Drink the other half of the Golytely jug at 11 PM night before procedure. If you arrive after 11 AM: Drink the other half of the Golytely jug at 6 AM day of procedure. For additional instructions refer to your colonoscopy prep instructions. 4000 mL 0     polyethylene glycol (MIRALAX) 17 g packet Take 238 g by mouth See Admin Instructions Starting at 4 pm night prior to surgery. Refer to \"Getting Ready for Surgery\" instructions. 21 packet 0       Allergies  Allergies   Allergen Reactions     Other Food Allergy      PN: LW Other1: -lactose intolerant       Social History  Social History     Socioeconomic History     Marital status: Single     Spouse name: Not on file     Number of children: Not on file     Years of education: Not on file     Highest education level: Not on file   Occupational History     Not on file   Tobacco Use     Smoking status: Former     Types: Cigarettes     Quit date: 2021     Years since quittin.5     Smokeless tobacco: Never   Vaping Use     Vaping Use: Never used   Substance and Sexual Activity     Alcohol use: Yes     Comment: beer per day, on average     Drug use: Yes     Types: Marijuana     Comment: smoking prn     Sexual activity: Not Currently     Partners: Female "   Other Topics Concern     Parent/sibling w/ CABG, MI or angioplasty before 65F 55M? No   Social History Narrative     Not on file     Social Determinants of Health     Financial Resource Strain: Not on file   Food Insecurity: Not on file   Transportation Needs: Not on file   Physical Activity: Not on file   Stress: Not on file   Social Connections: Not on file   Intimate Partner Violence: Not on file   Housing Stability: Not on file       Family History  Family History   Problem Relation Age of Onset     Hypertension Mother      Hypertension Father      Hypertension Maternal Grandmother      Hypertension Maternal Grandfather      Hypertension Paternal Grandmother      Hypertension Paternal Grandfather      Anesthesia Reaction No family hx of      Deep Vein Thrombosis (DVT) No family hx of        Review of Systems  The complete review of systems is negative other than noted in the HPI or here.   Anesthesia Evaluation   Pt has had prior anesthetic.     No history of anesthetic complications       ROS/MED HX  ENT/Pulmonary:     (+) sleep apnea, doesn't use CPAP, tobacco use, Past use,     Neurologic:  - neg neurologic ROS  (-) no seizures and no CVA   Cardiovascular:     (+) Dyslipidemia hypertension-----Previous cardiac testing   Echo: Date: Results:    Stress Test: Date: 2014 Results:  Normal exercise stress test  ECG Reviewed: Date: 10/2020 Results:  Sinus bradycardia, non specific T abn  Cath: Date: Results:   (-) taking anticoagulants/antiplatelets   METS/Exercise Tolerance: >4 METS Comment: Reports 15,000-16,000 steps daily. He states he does have longstanding history of SOLOMON with ascending stairs.    Hematologic:  - neg hematologic  ROS  (-) history of blood clots and history of blood transfusion   Musculoskeletal:  - neg musculoskeletal ROS     GI/Hepatic: Comment: Likely malignant colon polyp   (-) GERD   Renal/Genitourinary:  - neg Renal ROS     Endo:  - neg endo ROS  (-) chronic steroid usage    Psychiatric/Substance Use:     (+) psychiatric history depression     Infectious Disease:  - neg infectious disease ROS     Malignancy:       Other:            Virtual visit -  No vitals were obtained    Physical Exam  Constitutional: Awake, alert, cooperative, no apparent distress, and appears stated age.  HENT: Normocephalic  Respiratory: non labored breathing   Neurologic: Awake, alert, oriented to name, place and time.   Neuropsychiatric: Calm, cooperative. Normal affect.      Prior Labs/Diagnostic Studies   All labs and imaging personally reviewed     EKG/ stress test - if available please see in ROS above   No results found.  No flowsheet data found.      The patient's records and results personally reviewed by this provider.     Outside records reviewed from: Care Everywhere      Assessment      Edin Robles is a 67 year old male seen as a PAC referral for risk assessment and optimization for anesthesia.    Plan/Recommendations  Pt will be optimized for the proposed procedure.  See below for details on the assessment, risk, and preoperative recommendations    NEUROLOGY  - No history of TIA, CVA or seizure  -Post Op delirium risk factors:  No risk identified    ENT  - No current airway concerns.  Will need to be reassessed day of surgery.  Mallampati: Unable to assess  TM: Unable to assess    CARDIAC  -hypertension using norvasc and hctz  -dyslipidemia using lipitor  -Patient reports he is active and walks 16,000 steps most days. He endorses longstanding SOLOMON with ascending stairs.   - METS (Metabolic Equivalents)  Patient performs 4 or more METS exercise without symptoms            Total Score: 0      RCRI-Low risk: Class 2 0.9% complication rate            Total Score: 1    RCRI: High Risk Surgery        PULMONARY  - Obstructive Sleep Apnea  CHELITA without home CPAP use.  - Denies asthma or inhaler use   - Tobacco History      History   Smoking Status     Former     Types: Cigarettes     Quit date:  8/1/2021   Smokeless Tobacco     Never       GI  -possible colon malignancy with the above procedure planned   PONV Medium Risk  Total Score: 2           1 AN PONV: Patient is not a current smoker    1 AN PONV: Intended Post Op Opioids        /RENAL  - Baseline Creatinine WNL    ENDOCRINE    - BMI: Estimated body mass index is 28.89 kg/m  as calculated from the following:    Height as of 1/19/23: 1.829 m (6').    Weight as of 1/19/23: 96.6 kg (213 lb).  Overweight (BMI 25.0-29.9)  - No history of Diabetes Mellitus    HEME  VTE Low Risk 0.5%            Total Score: 3    VTE: Greater than 59 yrs old    VTE: Male      - Platelet disfunction second to Aspirin (Jose De Jesus, many others)  -will update T&S prior to surgery    Different anesthesia methods/types have been discussed with the patient, but they are aware that the final plan will be decided by the assigned anesthesia provider on the date of service.  Patient was discussed with Dr. Schumacher    The patient is optimized for their procedure. AVS with information on surgery time/arrival time, meds and NPO status given by nursing staff. No further diagnostic testing indicated.    Please refer to the physical examination documented by the anesthesiologist in the anesthesia record on the day of surgery.    Video-Visit Details    Type of service:  Video Visit    Provider received verbal consent for a Video Visit from the patient? Yes     Originating Location (pt. Location): Home    Distant Location (provider location):  Off-site  Mode of Communication:  Video Conference via [employer]  On the day of service:     Prep time: 7 minutes  Visit time: 10 minutes  Documentation time: 9 minutes  ------------------------------------------  Total time: 26 minutes      Bren Nicole PA-C  Preoperative Assessment Center  [facility]  [facility]  Phone: [phone]  Fax: [phone]

## 2023-03-06 LAB
ABO/RH(D): NORMAL
ANTIBODY SCREEN: NEGATIVE
SPECIMEN EXPIRATION DATE: NORMAL

## 2023-03-07 ENCOUNTER — LAB (OUTPATIENT)
Dept: LAB | Facility: CLINIC | Age: 68
End: 2023-03-07
Payer: COMMERCIAL

## 2023-03-07 DIAGNOSIS — C18.2 MALIGNANT NEOPLASM OF ASCENDING COLON (H): ICD-10-CM

## 2023-03-07 DIAGNOSIS — Z01.818 PRE-OP EVALUATION: ICD-10-CM

## 2023-03-07 LAB
ALBUMIN SERPL BCG-MCNC: 4 G/DL (ref 3.5–5.2)
ALP SERPL-CCNC: 67 U/L (ref 40–129)
ALT SERPL W P-5'-P-CCNC: 24 U/L (ref 10–50)
ANION GAP SERPL CALCULATED.3IONS-SCNC: 10 MMOL/L (ref 7–15)
APTT PPP: 27 SECONDS (ref 22–38)
AST SERPL W P-5'-P-CCNC: 25 U/L (ref 10–50)
BASOPHILS # BLD AUTO: 0.1 10E3/UL (ref 0–0.2)
BASOPHILS NFR BLD AUTO: 1 %
BILIRUB SERPL-MCNC: 0.4 MG/DL
BUN SERPL-MCNC: 18.9 MG/DL (ref 8–23)
CALCIUM SERPL-MCNC: 9.3 MG/DL (ref 8.8–10.2)
CHLORIDE SERPL-SCNC: 104 MMOL/L (ref 98–107)
CREAT SERPL-MCNC: 0.94 MG/DL (ref 0.67–1.17)
DEPRECATED HCO3 PLAS-SCNC: 25 MMOL/L (ref 22–29)
EOSINOPHIL # BLD AUTO: 0.2 10E3/UL (ref 0–0.7)
EOSINOPHIL NFR BLD AUTO: 2 %
ERYTHROCYTE [DISTWIDTH] IN BLOOD BY AUTOMATED COUNT: 14.2 % (ref 10–15)
GFR SERPL CREATININE-BSD FRML MDRD: 90 ML/MIN/1.73M2
GLUCOSE SERPL-MCNC: 86 MG/DL (ref 70–99)
HCT VFR BLD AUTO: 44.7 % (ref 40–53)
HGB BLD-MCNC: 14.7 G/DL (ref 13.3–17.7)
IMM GRANULOCYTES # BLD: 0.1 10E3/UL
IMM GRANULOCYTES NFR BLD: 1 %
INR PPP: 1.06 (ref 0.85–1.15)
LYMPHOCYTES # BLD AUTO: 2.4 10E3/UL (ref 0.8–5.3)
LYMPHOCYTES NFR BLD AUTO: 25 %
MCH RBC QN AUTO: 28.3 PG (ref 26.5–33)
MCHC RBC AUTO-ENTMCNC: 32.9 G/DL (ref 31.5–36.5)
MCV RBC AUTO: 86 FL (ref 78–100)
MONOCYTES # BLD AUTO: 1 10E3/UL (ref 0–1.3)
MONOCYTES NFR BLD AUTO: 10 %
NEUTROPHILS # BLD AUTO: 6 10E3/UL (ref 1.6–8.3)
NEUTROPHILS NFR BLD AUTO: 61 %
NRBC # BLD AUTO: 0 10E3/UL
NRBC BLD AUTO-RTO: 0 /100
PLATELET # BLD AUTO: 209 10E3/UL (ref 150–450)
POTASSIUM SERPL-SCNC: 4.2 MMOL/L (ref 3.4–5.3)
PROT SERPL-MCNC: 6.8 G/DL (ref 6.4–8.3)
RBC # BLD AUTO: 5.19 10E6/UL (ref 4.4–5.9)
SODIUM SERPL-SCNC: 139 MMOL/L (ref 136–145)
WBC # BLD AUTO: 9.7 10E3/UL (ref 4–11)

## 2023-03-07 PROCEDURE — 84134 ASSAY OF PREALBUMIN: CPT | Performed by: SURGERY

## 2023-03-07 PROCEDURE — 85025 COMPLETE CBC W/AUTO DIFF WBC: CPT | Performed by: PATHOLOGY

## 2023-03-07 PROCEDURE — 85610 PROTHROMBIN TIME: CPT | Mod: GA | Performed by: PATHOLOGY

## 2023-03-07 PROCEDURE — 80053 COMPREHEN METABOLIC PANEL: CPT | Performed by: PATHOLOGY

## 2023-03-07 PROCEDURE — 86850 RBC ANTIBODY SCREEN: CPT | Performed by: PHYSICIAN ASSISTANT

## 2023-03-07 PROCEDURE — 36415 COLL VENOUS BLD VENIPUNCTURE: CPT | Performed by: PATHOLOGY

## 2023-03-07 PROCEDURE — 85730 THROMBOPLASTIN TIME PARTIAL: CPT | Mod: GA | Performed by: PATHOLOGY

## 2023-03-07 ASSESSMENT — ENCOUNTER SYMPTOMS: SEIZURES: 0

## 2023-03-07 ASSESSMENT — LIFESTYLE VARIABLES: TOBACCO_USE: 1

## 2023-03-07 NOTE — ANESTHESIA PREPROCEDURE EVALUATION
Anesthesia Pre-Procedure Evaluation    Patient: Edin Robles   MRN: [id number] : 1955        Procedure : Procedure(s):  HEMICOLECTOMY, RIGHT, LAPAROSCOPY-ASSISTED          Past Medical History:   Diagnosis Date     Depressive disorder      Hypertension       Past Surgical History:   Procedure Laterality Date     COLONOSCOPY N/A 2023    Procedure: COLONOSCOPY, WITH POLYPECTOMY AND BIOPSY;  Surgeon: Liborio Tucker MD;  Location: [facility] OR     COLONOSCOPY       EYE SURGERY        Allergies   Allergen Reactions     Other Food Allergy      PN: LW Other1: -lactose intolerant      Social History     Tobacco Use     Smoking status: Former     Types: Cigarettes     Quit date: 2021     Years since quittin.5     Smokeless tobacco: Never   Substance Use Topics     Alcohol use: Yes     Comment: beer per day, on average      Wt Readings from Last 1 Encounters:   23 96.6 kg (213 lb)        Anesthesia Evaluation   Pt has had prior anesthetic.     No history of anesthetic complications       ROS/MED HX  ENT/Pulmonary:     (+) sleep apnea, doesn't use CPAP, tobacco use, Past use,     Neurologic:  - neg neurologic ROS  (-) no seizures and no CVA   Cardiovascular:     (+) Dyslipidemia hypertension-----Previous cardiac testing   Echo: Date: Results:    Stress Test: Date:  Results:  Normal exercise stress test  ECG Reviewed: Date: 10/2020 Results:  Sinus bradycardia, non specific T abn  Cath: Date: Results:   (-) taking anticoagulants/antiplatelets   METS/Exercise Tolerance: >4 METS Comment: Reports 15,000-16,000 steps daily. He states he does have longstanding history of SOLOMON with ascending stairs.    Hematologic:  - neg hematologic  ROS  (-) history of blood clots and history of blood transfusion   Musculoskeletal:  - neg musculoskeletal ROS     GI/Hepatic: Comment: Likely malignant colon polyp   (-) GERD   Renal/Genitourinary:  - neg Renal ROS     Endo:  - neg endo ROS  (-) chronic steroid  usage   Psychiatric/Substance Use:     (+) psychiatric history depression     Infectious Disease:  - neg infectious disease ROS     Malignancy:       Other:               OUTSIDE LABS:  CBC:   Lab Results   Component Value Date    WBC 8.8 01/14/2023    WBC 8.6 12/05/2022    HGB 15.7 01/14/2023    HGB 14.9 12/05/2022    HCT 48.6 01/14/2023    HCT 46.7 12/05/2022     01/14/2023     12/05/2022     BMP:   Lab Results   Component Value Date     01/14/2023     08/25/2022    POTASSIUM 4.0 01/14/2023    POTASSIUM 3.9 08/25/2022    CHLORIDE 102 01/14/2023    CHLORIDE 111 (H) 08/25/2022    CO2 29 01/14/2023    CO2 27 08/25/2022    BUN 18.0 01/14/2023    BUN 19 08/25/2022    CR 1.14 01/14/2023    CR 1.1 12/14/2022     (H) 01/14/2023     (H) 08/25/2022     COAGS: No results found for: PTT, INR, FIBR  POC: No results found for: BGM, HCG, HCGS  HEPATIC:   Lab Results   Component Value Date    ALBUMIN 4.5 01/14/2023    PROTTOTAL 7.6 01/14/2023    ALT 18 01/14/2023    AST 24 01/14/2023    ALKPHOS 83 01/14/2023    BILITOTAL 0.7 01/14/2023     OTHER:   Lab Results   Component Value Date    LEA 9.8 01/14/2023    TSH 0.24 (L) 12/05/2022    T4 0.85 12/05/2022       Anesthesia Plan    ASA Status:  3      Anesthesia Type: General.     - Airway: ETT   Induction: Intravenous, Propofol.   Maintenance: Balanced.   Techniques and Equipment:     - Lines/Monitors: 2nd IV     Consents            Postoperative Care            Comments:                Tarun Santos MD

## 2023-03-08 ENCOUNTER — ANESTHESIA (OUTPATIENT)
Dept: SURGERY | Facility: CLINIC | Age: 68
DRG: 331 | End: 2023-03-08
Payer: COMMERCIAL

## 2023-03-08 ENCOUNTER — HOSPITAL ENCOUNTER (INPATIENT)
Facility: CLINIC | Age: 68
LOS: 3 days | Discharge: HOME | DRG: 331 | End: 2023-03-11
Attending: SURGERY | Admitting: SURGERY
Payer: COMMERCIAL

## 2023-03-08 DIAGNOSIS — G89.18 ACUTE POST-OPERATIVE PAIN: Primary | ICD-10-CM

## 2023-03-08 PROBLEM — C18.9 COLON CANCER (H): Status: ACTIVE | Noted: 2023-03-08

## 2023-03-08 LAB
CREAT SERPL-MCNC: 0.92 MG/DL (ref 0.67–1.17)
GFR SERPL CREATININE-BSD FRML MDRD: >90 ML/MIN/1.73M2
GLUCOSE BLDC GLUCOMTR-MCNC: 113 MG/DL (ref 70–99)
PREALB SERPL IA-MCNC: 27 MG/DL (ref 15–45)
SARS-COV-2 RNA RESP QL NAA+PROBE: NEGATIVE

## 2023-03-08 PROCEDURE — 88309 TISSUE EXAM BY PATHOLOGIST: CPT | Mod: 26 | Performed by: PATHOLOGY

## 2023-03-08 PROCEDURE — 88329 PATH CONSLTJ DRG SURG: CPT | Performed by: PATHOLOGY

## 2023-03-08 PROCEDURE — 88305 TISSUE EXAM BY PATHOLOGIST: CPT | Mod: 26 | Performed by: PATHOLOGY

## 2023-03-08 PROCEDURE — 250N000011 HC RX IP 250 OP 636: Performed by: SURGERY

## 2023-03-08 PROCEDURE — 0DTF4ZZ RESECTION OF RIGHT LARGE INTESTINE, PERCUTANEOUS ENDOSCOPIC APPROACH: ICD-10-PCS | Performed by: SURGERY

## 2023-03-08 PROCEDURE — 258N000003 HC RX IP 258 OP 636: Performed by: SURGERY

## 2023-03-08 PROCEDURE — 250N000011 HC RX IP 250 OP 636: Performed by: NURSE ANESTHETIST, CERTIFIED REGISTERED

## 2023-03-08 PROCEDURE — 88342 IMHCHEM/IMCYTCHM 1ST ANTB: CPT | Mod: 26 | Performed by: PATHOLOGY

## 2023-03-08 PROCEDURE — 250N000025 HC SEVOFLURANE, PER MIN: Performed by: SURGERY

## 2023-03-08 PROCEDURE — 370N000017 HC ANESTHESIA TECHNICAL FEE, PER MIN: Performed by: SURGERY

## 2023-03-08 PROCEDURE — 999N000141 HC STATISTIC PRE-PROCEDURE NURSING ASSESSMENT: Performed by: SURGERY

## 2023-03-08 PROCEDURE — 710N000010 HC RECOVERY PHASE 1, LEVEL 2, PER MIN: Performed by: SURGERY

## 2023-03-08 PROCEDURE — 250N000013 HC RX MED GY IP 250 OP 250 PS 637: Performed by: SURGERY

## 2023-03-08 PROCEDURE — 88341 IMHCHEM/IMCYTCHM EA ADD ANTB: CPT | Mod: 26 | Performed by: PATHOLOGY

## 2023-03-08 PROCEDURE — 272N000001 HC OR GENERAL SUPPLY STERILE: Performed by: SURGERY

## 2023-03-08 PROCEDURE — 82565 ASSAY OF CREATININE: CPT | Performed by: SURGERY

## 2023-03-08 PROCEDURE — 120N000002 HC R&B MED SURG/OB UMMC

## 2023-03-08 PROCEDURE — 44205 LAP COLECTOMY PART W/ILEUM: CPT | Mod: GC | Performed by: SURGERY

## 2023-03-08 PROCEDURE — U0005 INFEC AGEN DETEC AMPLI PROBE: HCPCS | Performed by: ANESTHESIOLOGY

## 2023-03-08 PROCEDURE — 258N000003 HC RX IP 258 OP 636: Performed by: NURSE ANESTHETIST, CERTIFIED REGISTERED

## 2023-03-08 PROCEDURE — 88305 TISSUE EXAM BY PATHOLOGIST: CPT | Mod: TC | Performed by: SURGERY

## 2023-03-08 PROCEDURE — C9290 INJ, BUPIVACAINE LIPOSOME: HCPCS

## 2023-03-08 PROCEDURE — 250N000011 HC RX IP 250 OP 636: Performed by: ANESTHESIOLOGY

## 2023-03-08 PROCEDURE — 250N000011 HC RX IP 250 OP 636

## 2023-03-08 PROCEDURE — 0WQF4ZZ REPAIR ABDOMINAL WALL, PERCUTANEOUS ENDOSCOPIC APPROACH: ICD-10-PCS | Performed by: SURGERY

## 2023-03-08 PROCEDURE — 360N000077 HC SURGERY LEVEL 4, PER MIN: Performed by: SURGERY

## 2023-03-08 PROCEDURE — 36415 COLL VENOUS BLD VENIPUNCTURE: CPT | Performed by: SURGERY

## 2023-03-08 PROCEDURE — 250N000009 HC RX 250: Performed by: NURSE ANESTHETIST, CERTIFIED REGISTERED

## 2023-03-08 RX ORDER — GABAPENTIN 100 MG/1
100 CAPSULE ORAL AT BEDTIME
Status: DISCONTINUED | OUTPATIENT
Start: 2023-03-08 | End: 2023-03-11 | Stop reason: HOSPADM

## 2023-03-08 RX ORDER — ONDANSETRON 4 MG/1
4 TABLET, ORALLY DISINTEGRATING ORAL EVERY 6 HOURS PRN
Status: DISCONTINUED | OUTPATIENT
Start: 2023-03-08 | End: 2023-03-11 | Stop reason: HOSPADM

## 2023-03-08 RX ORDER — LIDOCAINE HYDROCHLORIDE 20 MG/ML
INJECTION, SOLUTION INFILTRATION; PERINEURAL PRN
Status: DISCONTINUED | OUTPATIENT
Start: 2023-03-08 | End: 2023-03-08

## 2023-03-08 RX ORDER — MEPERIDINE HYDROCHLORIDE 25 MG/ML
12.5 INJECTION INTRAMUSCULAR; INTRAVENOUS; SUBCUTANEOUS EVERY 5 MIN PRN
Status: DISCONTINUED | OUTPATIENT
Start: 2023-03-08 | End: 2023-03-08 | Stop reason: HOSPADM

## 2023-03-08 RX ORDER — BUPIVACAINE HYDROCHLORIDE 2.5 MG/ML
INJECTION, SOLUTION EPIDURAL; INFILTRATION; INTRACAUDAL
Status: COMPLETED | OUTPATIENT
Start: 2023-03-08 | End: 2023-03-08

## 2023-03-08 RX ORDER — HYDROMORPHONE HYDROCHLORIDE 1 MG/ML
0.4 INJECTION, SOLUTION INTRAMUSCULAR; INTRAVENOUS; SUBCUTANEOUS EVERY 5 MIN PRN
Status: DISCONTINUED | OUTPATIENT
Start: 2023-03-08 | End: 2023-03-08 | Stop reason: HOSPADM

## 2023-03-08 RX ORDER — ENOXAPARIN SODIUM 100 MG/ML
40 INJECTION SUBCUTANEOUS EVERY 24 HOURS
Status: DISCONTINUED | OUTPATIENT
Start: 2023-03-09 | End: 2023-03-11 | Stop reason: HOSPADM

## 2023-03-08 RX ORDER — METRONIDAZOLE 500 MG/100ML
500 INJECTION, SOLUTION INTRAVENOUS
Status: COMPLETED | OUTPATIENT
Start: 2023-03-08 | End: 2023-03-08

## 2023-03-08 RX ORDER — ONDANSETRON 2 MG/ML
4 INJECTION INTRAMUSCULAR; INTRAVENOUS EVERY 6 HOURS PRN
Status: DISCONTINUED | OUTPATIENT
Start: 2023-03-08 | End: 2023-03-11 | Stop reason: HOSPADM

## 2023-03-08 RX ORDER — HYDROMORPHONE HCL IN WATER/PF 6 MG/30 ML
0.2 PATIENT CONTROLLED ANALGESIA SYRINGE INTRAVENOUS
Status: DISCONTINUED | OUTPATIENT
Start: 2023-03-08 | End: 2023-03-11 | Stop reason: HOSPADM

## 2023-03-08 RX ORDER — FENTANYL CITRATE 50 UG/ML
25-50 INJECTION, SOLUTION INTRAMUSCULAR; INTRAVENOUS
Status: DISCONTINUED | OUTPATIENT
Start: 2023-03-08 | End: 2023-03-08

## 2023-03-08 RX ORDER — SODIUM CHLORIDE, SODIUM LACTATE, POTASSIUM CHLORIDE, CALCIUM CHLORIDE 600; 310; 30; 20 MG/100ML; MG/100ML; MG/100ML; MG/100ML
INJECTION, SOLUTION INTRAVENOUS CONTINUOUS
Status: DISCONTINUED | OUTPATIENT
Start: 2023-03-08 | End: 2023-03-09

## 2023-03-08 RX ORDER — ONDANSETRON 2 MG/ML
4 INJECTION INTRAMUSCULAR; INTRAVENOUS ONCE
Status: DISCONTINUED | OUTPATIENT
Start: 2023-03-08 | End: 2023-03-08

## 2023-03-08 RX ORDER — SODIUM CHLORIDE, SODIUM LACTATE, POTASSIUM CHLORIDE, CALCIUM CHLORIDE 600; 310; 30; 20 MG/100ML; MG/100ML; MG/100ML; MG/100ML
INJECTION, SOLUTION INTRAVENOUS CONTINUOUS
Status: DISCONTINUED | OUTPATIENT
Start: 2023-03-08 | End: 2023-03-08 | Stop reason: HOSPADM

## 2023-03-08 RX ORDER — ENOXAPARIN SODIUM 100 MG/ML
40 INJECTION SUBCUTANEOUS
Status: COMPLETED | OUTPATIENT
Start: 2023-03-08 | End: 2023-03-08

## 2023-03-08 RX ORDER — OXYCODONE HYDROCHLORIDE 10 MG/1
10 TABLET ORAL EVERY 4 HOURS PRN
Status: DISCONTINUED | OUTPATIENT
Start: 2023-03-08 | End: 2023-03-11 | Stop reason: HOSPADM

## 2023-03-08 RX ORDER — OXYCODONE HYDROCHLORIDE 5 MG/1
5 TABLET ORAL EVERY 4 HOURS PRN
Status: DISCONTINUED | OUTPATIENT
Start: 2023-03-08 | End: 2023-03-11 | Stop reason: HOSPADM

## 2023-03-08 RX ORDER — LABETALOL 20 MG/4 ML (5 MG/ML) INTRAVENOUS SYRINGE
PRN
Status: DISCONTINUED | OUTPATIENT
Start: 2023-03-08 | End: 2023-03-08

## 2023-03-08 RX ORDER — ATORVASTATIN CALCIUM 20 MG/1
20 TABLET, FILM COATED ORAL EVERY EVENING
Status: DISCONTINUED | OUTPATIENT
Start: 2023-03-08 | End: 2023-03-11 | Stop reason: HOSPADM

## 2023-03-08 RX ORDER — FLUMAZENIL 0.1 MG/ML
0.2 INJECTION, SOLUTION INTRAVENOUS
Status: DISCONTINUED | OUTPATIENT
Start: 2023-03-08 | End: 2023-03-08

## 2023-03-08 RX ORDER — NALOXONE HYDROCHLORIDE 0.4 MG/ML
0.2 INJECTION, SOLUTION INTRAMUSCULAR; INTRAVENOUS; SUBCUTANEOUS
Status: DISCONTINUED | OUTPATIENT
Start: 2023-03-08 | End: 2023-03-11 | Stop reason: HOSPADM

## 2023-03-08 RX ORDER — FENTANYL CITRATE 50 UG/ML
INJECTION, SOLUTION INTRAMUSCULAR; INTRAVENOUS PRN
Status: DISCONTINUED | OUTPATIENT
Start: 2023-03-08 | End: 2023-03-08

## 2023-03-08 RX ORDER — CEFAZOLIN SODIUM/WATER 2 G/20 ML
2 SYRINGE (ML) INTRAVENOUS SEE ADMIN INSTRUCTIONS
Status: DISCONTINUED | OUTPATIENT
Start: 2023-03-08 | End: 2023-03-10

## 2023-03-08 RX ORDER — FENTANYL CITRATE 50 UG/ML
25 INJECTION, SOLUTION INTRAMUSCULAR; INTRAVENOUS EVERY 5 MIN PRN
Status: DISCONTINUED | OUTPATIENT
Start: 2023-03-08 | End: 2023-03-08 | Stop reason: HOSPADM

## 2023-03-08 RX ORDER — HYDROMORPHONE HYDROCHLORIDE 1 MG/ML
0.2 INJECTION, SOLUTION INTRAMUSCULAR; INTRAVENOUS; SUBCUTANEOUS EVERY 5 MIN PRN
Status: DISCONTINUED | OUTPATIENT
Start: 2023-03-08 | End: 2023-03-08 | Stop reason: HOSPADM

## 2023-03-08 RX ORDER — HALOPERIDOL 5 MG/ML
1 INJECTION INTRAMUSCULAR
Status: DISCONTINUED | OUTPATIENT
Start: 2023-03-08 | End: 2023-03-08 | Stop reason: HOSPADM

## 2023-03-08 RX ORDER — DIAZEPAM 10 MG/2ML
2.5 INJECTION, SOLUTION INTRAMUSCULAR; INTRAVENOUS
Status: DISCONTINUED | OUTPATIENT
Start: 2023-03-08 | End: 2023-03-08 | Stop reason: HOSPADM

## 2023-03-08 RX ORDER — GABAPENTIN 300 MG/1
300 CAPSULE ORAL
Status: COMPLETED | OUTPATIENT
Start: 2023-03-08 | End: 2023-03-08

## 2023-03-08 RX ORDER — CEFAZOLIN SODIUM/WATER 2 G/20 ML
2 SYRINGE (ML) INTRAVENOUS
Status: COMPLETED | OUTPATIENT
Start: 2023-03-08 | End: 2023-03-08

## 2023-03-08 RX ORDER — FENTANYL CITRATE 50 UG/ML
50 INJECTION, SOLUTION INTRAMUSCULAR; INTRAVENOUS EVERY 5 MIN PRN
Status: DISCONTINUED | OUTPATIENT
Start: 2023-03-08 | End: 2023-03-08 | Stop reason: HOSPADM

## 2023-03-08 RX ORDER — ONDANSETRON 2 MG/ML
INJECTION INTRAMUSCULAR; INTRAVENOUS PRN
Status: DISCONTINUED | OUTPATIENT
Start: 2023-03-08 | End: 2023-03-08

## 2023-03-08 RX ORDER — ACETAMINOPHEN 500 MG
1000 TABLET ORAL EVERY 6 HOURS
Status: DISCONTINUED | OUTPATIENT
Start: 2023-03-08 | End: 2023-03-11 | Stop reason: HOSPADM

## 2023-03-08 RX ORDER — NALOXONE HYDROCHLORIDE 0.4 MG/ML
0.4 INJECTION, SOLUTION INTRAMUSCULAR; INTRAVENOUS; SUBCUTANEOUS
Status: DISCONTINUED | OUTPATIENT
Start: 2023-03-08 | End: 2023-03-11 | Stop reason: HOSPADM

## 2023-03-08 RX ORDER — LIDOCAINE 40 MG/G
CREAM TOPICAL
Status: DISCONTINUED | OUTPATIENT
Start: 2023-03-08 | End: 2023-03-11 | Stop reason: HOSPADM

## 2023-03-08 RX ORDER — ACETAMINOPHEN 325 MG/1
975 TABLET ORAL ONCE
Status: COMPLETED | OUTPATIENT
Start: 2023-03-08 | End: 2023-03-08

## 2023-03-08 RX ORDER — PROPOFOL 10 MG/ML
INJECTION, EMULSION INTRAVENOUS PRN
Status: DISCONTINUED | OUTPATIENT
Start: 2023-03-08 | End: 2023-03-08

## 2023-03-08 RX ORDER — METHOCARBAMOL 500 MG/1
500 TABLET, FILM COATED ORAL 4 TIMES DAILY
Status: DISCONTINUED | OUTPATIENT
Start: 2023-03-08 | End: 2023-03-11 | Stop reason: HOSPADM

## 2023-03-08 RX ORDER — HYDRALAZINE HYDROCHLORIDE 20 MG/ML
2.5-5 INJECTION INTRAMUSCULAR; INTRAVENOUS EVERY 10 MIN PRN
Status: DISCONTINUED | OUTPATIENT
Start: 2023-03-08 | End: 2023-03-08 | Stop reason: HOSPADM

## 2023-03-08 RX ORDER — SODIUM CHLORIDE, SODIUM LACTATE, POTASSIUM CHLORIDE, CALCIUM CHLORIDE 600; 310; 30; 20 MG/100ML; MG/100ML; MG/100ML; MG/100ML
INJECTION, SOLUTION INTRAVENOUS CONTINUOUS PRN
Status: DISCONTINUED | OUTPATIENT
Start: 2023-03-08 | End: 2023-03-08

## 2023-03-08 RX ORDER — ONDANSETRON 4 MG/1
4 TABLET, ORALLY DISINTEGRATING ORAL EVERY 30 MIN PRN
Status: DISCONTINUED | OUTPATIENT
Start: 2023-03-08 | End: 2023-03-08 | Stop reason: HOSPADM

## 2023-03-08 RX ORDER — LABETALOL HYDROCHLORIDE 5 MG/ML
10 INJECTION, SOLUTION INTRAVENOUS
Status: DISCONTINUED | OUTPATIENT
Start: 2023-03-08 | End: 2023-03-08 | Stop reason: HOSPADM

## 2023-03-08 RX ORDER — HYDROMORPHONE HCL IN WATER/PF 6 MG/30 ML
0.4 PATIENT CONTROLLED ANALGESIA SYRINGE INTRAVENOUS
Status: DISCONTINUED | OUTPATIENT
Start: 2023-03-08 | End: 2023-03-11 | Stop reason: HOSPADM

## 2023-03-08 RX ORDER — ONDANSETRON 2 MG/ML
4 INJECTION INTRAMUSCULAR; INTRAVENOUS EVERY 30 MIN PRN
Status: DISCONTINUED | OUTPATIENT
Start: 2023-03-08 | End: 2023-03-08 | Stop reason: HOSPADM

## 2023-03-08 RX ADMIN — LIDOCAINE HYDROCHLORIDE 100 MG: 20 INJECTION, SOLUTION INFILTRATION; PERINEURAL at 07:42

## 2023-03-08 RX ADMIN — SODIUM CHLORIDE, POTASSIUM CHLORIDE, SODIUM LACTATE AND CALCIUM CHLORIDE: 600; 310; 30; 20 INJECTION, SOLUTION INTRAVENOUS at 11:56

## 2023-03-08 RX ADMIN — PROPOFOL 100 MG: 10 INJECTION, EMULSION INTRAVENOUS at 07:42

## 2023-03-08 RX ADMIN — Medication 2 G: at 08:10

## 2023-03-08 RX ADMIN — ACETAMINOPHEN 1000 MG: 500 TABLET ORAL at 15:59

## 2023-03-08 RX ADMIN — FENTANYL CITRATE 50 MCG: 50 INJECTION, SOLUTION INTRAMUSCULAR; INTRAVENOUS at 12:58

## 2023-03-08 RX ADMIN — ACETAMINOPHEN 1000 MG: 500 TABLET ORAL at 21:16

## 2023-03-08 RX ADMIN — ATORVASTATIN CALCIUM 20 MG: 20 TABLET, FILM COATED ORAL at 21:16

## 2023-03-08 RX ADMIN — DEXMEDETOMIDINE HYDROCHLORIDE 50 MCG: 100 INJECTION, SOLUTION INTRAVENOUS at 08:47

## 2023-03-08 RX ADMIN — METRONIDAZOLE 500 MG: 5 INJECTION, SOLUTION INTRAVENOUS at 07:18

## 2023-03-08 RX ADMIN — FENTANYL CITRATE 100 MCG: 50 INJECTION, SOLUTION INTRAMUSCULAR; INTRAVENOUS at 07:41

## 2023-03-08 RX ADMIN — Medication 10 MG: at 10:22

## 2023-03-08 RX ADMIN — SODIUM CHLORIDE, POTASSIUM CHLORIDE, SODIUM LACTATE AND CALCIUM CHLORIDE: 600; 310; 30; 20 INJECTION, SOLUTION INTRAVENOUS at 08:38

## 2023-03-08 RX ADMIN — FENTANYL CITRATE 50 MCG: 50 INJECTION, SOLUTION INTRAMUSCULAR; INTRAVENOUS at 07:16

## 2023-03-08 RX ADMIN — Medication 50 MG: at 07:44

## 2023-03-08 RX ADMIN — GABAPENTIN 100 MG: 100 CAPSULE ORAL at 21:17

## 2023-03-08 RX ADMIN — FENTANYL CITRATE 150 MCG: 50 INJECTION, SOLUTION INTRAMUSCULAR; INTRAVENOUS at 08:42

## 2023-03-08 RX ADMIN — MIDAZOLAM 1 MG: 1 INJECTION INTRAMUSCULAR; INTRAVENOUS at 07:16

## 2023-03-08 RX ADMIN — FENTANYL CITRATE 50 MCG: 50 INJECTION, SOLUTION INTRAMUSCULAR; INTRAVENOUS at 10:22

## 2023-03-08 RX ADMIN — FENTANYL CITRATE 50 MCG: 50 INJECTION, SOLUTION INTRAMUSCULAR; INTRAVENOUS at 12:33

## 2023-03-08 RX ADMIN — SUGAMMADEX 200 MG: 100 INJECTION, SOLUTION INTRAVENOUS at 11:30

## 2023-03-08 RX ADMIN — FENTANYL CITRATE 150 MCG: 50 INJECTION, SOLUTION INTRAMUSCULAR; INTRAVENOUS at 08:25

## 2023-03-08 RX ADMIN — ONDANSETRON 4 MG: 2 INJECTION INTRAMUSCULAR; INTRAVENOUS at 11:20

## 2023-03-08 RX ADMIN — Medication 20 MG: at 08:25

## 2023-03-08 RX ADMIN — SODIUM CHLORIDE, POTASSIUM CHLORIDE, SODIUM LACTATE AND CALCIUM CHLORIDE: 600; 310; 30; 20 INJECTION, SOLUTION INTRAVENOUS at 15:58

## 2023-03-08 RX ADMIN — METHOCARBAMOL 500 MG: 500 TABLET ORAL at 15:59

## 2023-03-08 RX ADMIN — BUPIVACAINE HYDROCHLORIDE 20 ML: 2.5 INJECTION, SOLUTION EPIDURAL; INFILTRATION; INTRACAUDAL; PERINEURAL at 07:10

## 2023-03-08 RX ADMIN — LABETALOL 20 MG/4 ML (5 MG/ML) INTRAVENOUS SYRINGE 10 MG: at 08:44

## 2023-03-08 RX ADMIN — ENOXAPARIN SODIUM 40 MG: 40 INJECTION SUBCUTANEOUS at 07:22

## 2023-03-08 RX ADMIN — GABAPENTIN 300 MG: 300 CAPSULE ORAL at 06:43

## 2023-03-08 RX ADMIN — SODIUM CHLORIDE, POTASSIUM CHLORIDE, SODIUM LACTATE AND CALCIUM CHLORIDE: 600; 310; 30; 20 INJECTION, SOLUTION INTRAVENOUS at 07:27

## 2023-03-08 RX ADMIN — BUPIVACAINE 20 ML: 13.3 INJECTION, SUSPENSION, LIPOSOMAL INFILTRATION at 07:10

## 2023-03-08 RX ADMIN — Medication 20 MG: at 09:10

## 2023-03-08 RX ADMIN — METHOCARBAMOL 500 MG: 500 TABLET ORAL at 21:16

## 2023-03-08 RX ADMIN — ONDANSETRON 4 MG: 2 INJECTION INTRAMUSCULAR; INTRAVENOUS at 21:43

## 2023-03-08 RX ADMIN — ACETAMINOPHEN 975 MG: 325 TABLET ORAL at 06:43

## 2023-03-08 ASSESSMENT — ACTIVITIES OF DAILY LIVING (ADL)
ADLS_ACUITY_SCORE: 22
ADLS_ACUITY_SCORE: 18
ADLS_ACUITY_SCORE: 22
ADLS_ACUITY_SCORE: 22
ADLS_ACUITY_SCORE: 18
ADLS_ACUITY_SCORE: 18

## 2023-03-08 NOTE — ANESTHESIA PROCEDURE NOTES
Airway       Patient location during procedure: OR       Procedure Start/Stop Times: 3/8/2023 7:47 AM  Staff -        CRNA: Chaz Roa APRN CRNA       Performed By: CRNA  Consent for Airway        Urgency: elective  Indications and Patient Condition       Indications for airway management: hu-procedural       Induction type:intravenous       Mask difficulty assessment: 1 - vent by mask    Final Airway Details       Final airway type: endotracheal airway       Successful airway: ETT - single  Endotracheal Airway Details        ETT size (mm): 8.0       Cuffed: yes       Cuff volume (mL): 8       Successful intubation technique: direct laryngoscopy       DL Blade Type: [place] 2       Grade View of Cords: 1       Position: Right       Measured from: gums/teeth       Secured at (cm): 23       Bite block used: None    Post intubation assessment        Placement verified by: capnometry and equal breath sounds        Number of attempts at approach: 1       Number of other approaches attempted: 0       Secured with: plastic tape       Ease of procedure: easy       Dentition: Intact and Unchanged    Medication(s) Administered   Medication Administration Time: 3/8/2023 7:47 AM

## 2023-03-08 NOTE — PROGRESS NOTES
Admitted/transferred from: PACU  2 RN   skin assessment completed by Joanna Allred, RN and Geno WAY RN (7C)  Skin assessment finding: none.  Interventions/actions: none.    Will continue to monitor.

## 2023-03-08 NOTE — ANESTHESIA PROCEDURE NOTES
TAP Procedure Note    Pre-Procedure   Staff -        Anesthesiologist:  Yossi Bassett MD       Resident/Fellow: Doni Rachel       Performed By: resident       Location: pre-op       Procedure Start/Stop Times: 3/8/2023 7:10 AM and 3/8/2023 7:20 AM       Pre-Anesthestic Checklist: patient identified, IV checked, site marked, risks and benefits discussed, informed consent, monitors and equipment checked, pre-op evaluation, at physician/surgeon's request and post-op pain management  Timeout:       Correct Patient: Yes        Correct Procedure: Yes        Correct Site: Yes        Correct Position: Yes        Correct Laterality: Yes        Site Marked: Yes  Procedure Documentation  Procedure: TAP       Laterality: bilateral       Patient Position: supine       Patient Prep/Sterile Barriers: sterile gloves, mask       Skin prep: Chloraprep       Needle Type: short bevel       Needle Gauge: 21.        Needle Length (millimeters): 100        Ultrasound guided       1. Ultrasound was used to identify targeted nerve, plexus, vascular marker, or fascial plane and place a needle adjacent to it in real-time.       2. Ultrasound was used to visualize the spread of anesthetic in close proximity to the above referenced structure.       3. A permanent image is entered into the patient's record.    Assessment/Narrative         The placement was negative for: blood aspirated, painful injection and site bleeding       Paresthesias: No.       Bolus given via needle..        Secured via.        Insertion/Infusion Method: Single Shot       Complications: none    Medication(s) Administered   Bupivacaine 0.25% PF (Infiltration) - Infiltration   20 mL - 3/8/2023 7:10:00 AM  Bupivacaine liposome (Exparel) 1.3% LA inj susp (Infiltration) - Infiltration   20 mL - 3/8/2023 7:10:00 AM  Medication Administration Time: 3/8/2023 7:10 AM      FOR [facility] ([facility]) ONLY:   Pain Team Contact information: please page the Pain Team Via  "[employer]. Search \"Pain\". During daytime hours, please page the attending first. At night please page the resident first.    "

## 2023-03-08 NOTE — PHARMACY-ADMISSION MEDICATION HISTORY
"  Admission Medication History Completed by Pharmacy    See [facility] Admission Navigator for allergy information, preferred outpatient pharmacy, prior to admission medications and immunization status.     Medication History Sources:     Pre-op RN assessment, Dispense history      Prior to Admission medications    Medication Sig Last Dose Taking? Auth Provider Long Term End Date   ibuprofen (ADVIL/MOTRIN) 100 MG tablet Take 100 mg by mouth every 4 hours as needed Past Week Yes Reported, Patient     metroNIDAZOLE (FLAGYL) 500 MG tablet Take 1 tablet (500 mg) by mouth every 6 hours At 8:00 am, 2:00 pm, 8:00 pm the day prior to your surgery with neomycin and zofran. 3/7/2023 at 2000 Yes Nir Escobar MD     Moringa Oleifera (MORINGA PO) Take by mouth every morning Unknown Yes Reported, Patient     neomycin (MYCIFRADIN) 500 MG tablet Take 2 tablets (1,000 mg) by mouth every 6 hours At 8:00 am, 2:00 pm, 8:00 pm the day prior to your surgery with flagyl and zofran. 3/7/2023 at 2000 Yes Nir Escobar MD     ondansetron (ZOFRAN) 4 MG tablet Take 1 tablet (4 mg) by mouth every 6 hours At 8:00 am, 2:00 pm, 8:00 pm the day prior to your surgery with neomycin and flagyl. 3/7/2023 at 0800 Yes Nir Escobar MD     polyethylene glycol (MIRALAX) 17 g packet Take 238 g by mouth See Admin Instructions Starting at 4 pm night prior to surgery. Refer to \"Getting Ready for Surgery\" instructions. 3/7/2023 at 2300 Yes Nir Escobar MD     amLODIPine (NORVASC) 10 MG tablet Take 1 tablet (10 mg) by mouth daily  Patient taking differently: Take 10 mg by mouth every morning 3/6/2023 at 0800  Jessica Small MD Yes    atorvastatin (LIPITOR) 20 MG tablet Take 1 tablet (20 mg) by mouth daily  Patient taking differently: Take 20 mg by mouth every morning 3/6/2023 at 2100  Jessica Small MD Yes    Bioflavonoid Products (VITAMIN C) CHEW Take by mouth every morning 3/6/2023 at 0800  Reported, Patient     calcium carbonate-vitamin D " (OSCAL) 250-3.125 MG-MCG TABS per tablet Take 1 tablet by mouth every morning 3/5/2023 at 0800  Reported, Patient     hydrochlorothiazide (HYDRODIURIL) 25 MG tablet Take 1 tablet (25 mg) by mouth daily  Patient taking differently: Take 25 mg by mouth every morning 3/6/2023 at 0800  Jessica Small MD Yes    naproxen (NAPROSYN) 125 MG/5ML suspension Take by mouth 2 times daily  Patient not taking: Reported on 3/1/2023 3/6/2023 at 0800  Reported, Patient     Omega-3 Fatty Acids (FISH OIL) 1200 MG capsule Take 1,200 mg by mouth every morning 3/6/2023 at 0800  Reported, Patient     TURMERIC PO Take by mouth every morning 3/6/2023 at 0800  Reported, Patient     UNABLE TO FIND every morning MEDICATION NAME: Brian 3/6/2023 at 0800  Reported, Patient     vitamin B complex with vitamin C (STRESS TAB) tablet Take 1 tablet by mouth every morning 3/6/2023 at 0800  Reported, Patient         Date completed: 03/08/23    Medication history completed by: Nighat De La Torre [facility]

## 2023-03-08 NOTE — BRIEF OP NOTE
[facility]    Brief Operative Note    Pre-operative diagnosis: Malignant neoplasm of ascending colon (H) [C18.2]  Post-operative diagnosis Ascending colon cancer    Procedure:   1. Laparoscopic right hemicolectomy  2. Umbilical hernia repair    Surgeon: Surgeon(s) and Role:     * Nir Escobar MD - Primary     * Regina Vazquez MD - Resident - Assisting  Anesthesia: General with Block   Estimated Blood Loss: 30 mL  IVF 2000 mL  UOP: 175 mL    Drains: None  Specimens:   ID Type Source Tests Collected by Time Destination   1 : Umbilical Hernia Contents Tissue Other SURGICAL PATHOLOGY EXAM Nir Escobar MD 3/8/2023 10:21 AM    2 : Terminal Ilieum, Appendix, Ascending Colon, Proximal Transverse Colon Tissue Other SURGICAL PATHOLOGY EXAM Nir Escobar MD 3/8/2023 10:46 AM      Findings:   No evidence of metastatic disease. Margins well clear of tumor. Side to side functional end to end anastomosis. Right ureter, duodenum identified and protected the entire case. 1 cm umbilical hernia repaired primarily at closure.  Complications: None.  Implants:  None      Nir Escobar MD  Division of Colon and Rectal Surgery  [facility]  [phone]

## 2023-03-08 NOTE — PROGRESS NOTES
Transversus Abdominal Plane block performed without complications.  VSS.  Pt tolerated well.  Will continue to monitor.

## 2023-03-08 NOTE — OP NOTE
[facility] Colorectal Surgery Operative Report  March 8, 2023    PREOPERATIVE DIAGNOSIS:  1. Colon polyp, likely malignant based on colonoscopy report, at least unresectable endoscopically.  2. HTN (hydrochlorothiazide, norvasc)  3. HLD (lipitor)  4. History of smoking, quit 16 months ago  5. Cocaine abuse in remission    POSTOPERATIVE DIAGNOSIS:   1. Colon polyp, likely malignant based on colonoscopy report, at least unresectable endoscopically.  2. HTN (hydrochlorothiazide, norvasc)  3. HLD (lipitor)  4. History of smoking, quit 16 months ago  5. Cocaine abuse in remission    PROCEDURE:  1. Laparoscopic right colectomy.  2. Umbilical hernia    ANESTHESIA: General endotracheal anesthesia plus local anesthesia.    SURGEON:  Nir Escobar M.D.    ASSISTANT(S): Regina Vazquez, PGY3    INDICATIONS FOR PROCEDURE  Edin Robles is a 67 year old male who presented after colonoscopy revealed an unresectable 2 x 1 cm likely malignant lesion at the ascending colon. I recommended laparoscopic right hemicolectomy. I thoroughly discussed the risks, benefits, and alternatives of operative treatment with the patient and he agreed to proceed.    General risks related to abdominal surgery were reviewed with the patient. These include, but are not limited to, death, myocardial infarction, pneumonia, urinary tract infection, deep venous thrombosis with or without pulmonary embolus, abdominal infection from bowel injury or abscess, fistula, anastomotic leak that may require reoperation and a stoma, ureteral injury, bowel obstruction, wound infection, and bleeding.    OPERATIVE PROCEDURE: After obtaining informed consent, the patient was brought to the operating room and placed in the supine position. Appropriate preoperative mechanical and chemical deep venous thrombosis prophylaxis, as well as preoperative prophylactic parenteral antibiotics were given. General endotracheal anesthesia was gently induced. Bilateral lower extremity  "pneumatic compression devices were applied and all pressure points were cushioned. A Edwards catheter was inserted without difficulty. The abdomen was then preped and draped in the standard sterile fashion. After a \"time-out\" was performed, a 12mm infraumbilical incision was made and a 12mm trocar was inserted in an open fashion. Pneumoperitoneum was established with CO2 without difficulty. A 10mm 30 degree angle laparoscope was then used to explore the peritoneal cavity. No evidence of bleeding, bowel injury or metastatic disease was visualized. Additional trocars were placed at the suprapubic area (5mm) and left lower quadrant (5mm), under direct vision. The small bowel and omentum were then displaced towards the left side of the peritoneal cavity and the right colon was tented caudally and laterally. The ileocolic vessels were then identified and dissected at their origin with monopolar and bipolar electrocautery taking care to fully visualize and protect the duodenum and right ureter. The ileocolic artery was divided at its origin with an Endo LUPILLO stapler, white load. No bleeding was visualized. The cecum, ascending colon and proximal transverse colon were mobilized using a medial-to-lateral technique and the mesocolon was divided with a Ligusure device up to the required bowel segments for our resection and anastomosis. The lateral attachments of the appendix and ascending colon were then divided. After obtaining sufficient mobilization of the colon to perform our resection, a 5-cm periumbilical extraction incision was made and a wound protector was placed. The terminal ileum, ascending colon and proximal transverse colon were brought through the incision. The greater omentum up to our proposed area of colonic transection was dissected and divided with the Ligasure device. The terminal ileum and transverse colon were aligned, making sure to not incorporate mesentery or adjacent tissues, and a stapled side-to-side " functional end-to-end ileo-colic anastomosis was made with a LUPILLO 75 surgical stapler. The anastomosis was evaluated through the common enterotomy and no bleeding was visualized. After this, a TA 90 surgical stapler was used to transect the remaining colon and terminal ileum, making sure the LUPILLO staple lines were not aligned. The specimen, including the terminal ileum, appendix, ascending colon and proximal transverse colon were sent to pathology. The anastomosis appeared to be well vascularized, widely patent, and without tension or torsion. Areas of bleeding, and the crossing staple lines, at the anastomosis were reinforced with 3-0 PDS.    The bowel was then returned to the peritoneal cavity. The peritoneal cavity was irrigated and suctioned. There was no evidence of bleeding or bowel injury. The periumbilical extraction incision fascia was re-approximated with running 0 PDS sutures to include the 1 cm umbilical hernia. The abdomen was reinsufflated, and there was no evidence of bleeding or succus, and the anastomosis appeared healthy. All trocars were removed with no signs of bleeding. Pneumoperitoneum was deflated. Instrument, sponge, and needle counts were all correct, as reported to me, at this time.     Wounds were irrigated and dried, and hemostasis was corroborated. Skin incisions were re-approximated with running subcuticular 4-0 Monocryl sutures and Dermabond was applied. The patient tolerated the procedure well.    COMPLICATIONS: none.    ESTIMATED BLOOD LOSS: 30 mL.    URINE OUTPUT: 175 mL    REPLACEMENT:     - Crystalloid: 2000 mL.     - Colloid: 0 mL.     - Blood products: 0.    SPECIMEN(S): terminal ileum, vermiform appendix, ascending colon, proximal transverse colon, mesocolon, and partial omentectomy.    OPERATIVE COUNT: Complete.    OPERATIVE FINDINGS:   1.  No evidence of metastatic disease.   2. Margins well clear of tumor.   3. Side to side functional end to end anastomosis.   4. Right ureter,  duodenum identified and protected the entire case.   5. 1 cm umbilical hernia repaired primarily at closure    Nir Escobar MD  Division of Colon and Rectal Surgery  [facility]  [phone]

## 2023-03-08 NOTE — ANESTHESIA CARE TRANSFER NOTE
Patient: Edin Robles    Procedure: Procedure(s):  HEMICOLECTOMY, RIGHT, LAPAROSCOPY-ASSISTED  Herniorrhaphy umbilical Repair       Diagnosis: Malignant neoplasm of ascending colon (H) [C18.2]  Diagnosis Additional Information: No value filed.    Anesthesia Type:   General     Note:    Oropharynx: oropharynx clear of all foreign objects and spontaneously breathing  Level of Consciousness: awake and drowsy  Oxygen Supplementation: nasal cannula  Level of Supplemental Oxygen (L/min / FiO2): 4  Independent Airway: airway patency satisfactory and stable  Dentition: dentition unchanged  Vital Signs Stable: post-procedure vital signs reviewed and stable  Report to RN Given: handoff report given  Patient transferred to: PACU  Comments: Awake, VSS obstructive resp pattern as baseline. Tolerated anesthesia well  Handoff Report: Identifed the Patient, Identified the Reponsible Provider, Reviewed the pertinent medical history, Discussed the surgical course, Reviewed Intra-OP anesthesia mangement and issues during anesthesia, Set expectations for post-procedure period and Allowed opportunity for questions and acknowledgement of understanding      Vitals:  Vitals Value Taken Time   /108 03/08/23 1200   Temp 36.7  C (98  F) 03/08/23 1150   Pulse 56 03/08/23 1207   Resp 18 03/08/23 1150   SpO2 99 % 03/08/23 1207   Vitals shown include unvalidated device data.    Electronically Signed By: JOSSELINE Archuleta CRNA  March 8, 2023  12:08 PM

## 2023-03-08 NOTE — ANESTHESIA POSTPROCEDURE EVALUATION
Patient: Edin Robles    Procedure: Procedure(s):  HEMICOLECTOMY, RIGHT, LAPAROSCOPY-ASSISTED  Herniorrhaphy umbilical Repair       Anesthesia Type:  General    Note:  Disposition: Inpatient   Postop Pain Control: Uneventful            Sign Out: Well controlled pain   PONV: No   Neuro/Psych: Uneventful            Sign Out: Acceptable/Baseline neuro status   Airway/Respiratory: Uneventful            Sign Out: Acceptable/Baseline resp. status   CV/Hemodynamics: Uneventful            Sign Out: Acceptable CV status; No obvious hypovolemia; No obvious fluid overload   Other NRE: NONE   DID A NON-ROUTINE EVENT OCCUR? No           Last vitals:  Vitals Value Taken Time   /106 03/08/23 1220   Temp 36.7  C (98  F) 03/08/23 1150   Pulse 51 03/08/23 1230   Resp 14 03/08/23 1220   SpO2 99 % 03/08/23 1230   Vitals shown include unvalidated device data.    Electronically Signed By: Tarun Santos MD  March 8, 2023  12:31 PM

## 2023-03-08 NOTE — INTERVAL H&P NOTE
I have reviewed the virtual H&P that is linked to this encounter. The physical exam performed by anesthesia during this surgical encounter serves as the physical portion of that the virtual H&P. There are no significant changes from that history and physical as noted.    Clinical Conditions Present on Arrival:  Clinically Significant Risk Factors Present on Admission                         
No

## 2023-03-09 ENCOUNTER — APPOINTMENT (OUTPATIENT)
Dept: OCCUPATIONAL THERAPY | Facility: CLINIC | Age: 68
DRG: 331 | End: 2023-03-09
Attending: SURGERY
Payer: COMMERCIAL

## 2023-03-09 LAB
ANION GAP SERPL CALCULATED.3IONS-SCNC: 12 MMOL/L (ref 7–15)
BUN SERPL-MCNC: 10.6 MG/DL (ref 8–23)
CALCIUM SERPL-MCNC: 9.1 MG/DL (ref 8.8–10.2)
CHLORIDE SERPL-SCNC: 103 MMOL/L (ref 98–107)
CREAT SERPL-MCNC: 0.84 MG/DL (ref 0.67–1.17)
DEPRECATED HCO3 PLAS-SCNC: 22 MMOL/L (ref 22–29)
ERYTHROCYTE [DISTWIDTH] IN BLOOD BY AUTOMATED COUNT: 14 % (ref 10–15)
GFR SERPL CREATININE-BSD FRML MDRD: >90 ML/MIN/1.73M2
GLUCOSE BLDC GLUCOMTR-MCNC: 111 MG/DL (ref 70–99)
GLUCOSE SERPL-MCNC: 130 MG/DL (ref 70–99)
HCT VFR BLD AUTO: 45.5 % (ref 40–53)
HGB BLD-MCNC: 14.7 G/DL (ref 13.3–17.7)
MAGNESIUM SERPL-MCNC: 2 MG/DL (ref 1.7–2.3)
MCH RBC QN AUTO: 28.2 PG (ref 26.5–33)
MCHC RBC AUTO-ENTMCNC: 32.3 G/DL (ref 31.5–36.5)
MCV RBC AUTO: 87 FL (ref 78–100)
PLATELET # BLD AUTO: 212 10E3/UL (ref 150–450)
POTASSIUM SERPL-SCNC: 3.7 MMOL/L (ref 3.4–5.3)
RBC # BLD AUTO: 5.22 10E6/UL (ref 4.4–5.9)
SODIUM SERPL-SCNC: 137 MMOL/L (ref 136–145)
WBC # BLD AUTO: 15.3 10E3/UL (ref 4–11)

## 2023-03-09 PROCEDURE — 250N000013 HC RX MED GY IP 250 OP 250 PS 637: Performed by: SURGERY

## 2023-03-09 PROCEDURE — 36415 COLL VENOUS BLD VENIPUNCTURE: CPT | Performed by: SURGERY

## 2023-03-09 PROCEDURE — 999N000147 HC STATISTIC PT IP EVAL DEFER

## 2023-03-09 PROCEDURE — 258N000003 HC RX IP 258 OP 636: Performed by: SURGERY

## 2023-03-09 PROCEDURE — 83735 ASSAY OF MAGNESIUM: CPT | Performed by: SURGERY

## 2023-03-09 PROCEDURE — 250N000011 HC RX IP 250 OP 636: Performed by: SURGERY

## 2023-03-09 PROCEDURE — 85027 COMPLETE CBC AUTOMATED: CPT | Performed by: SURGERY

## 2023-03-09 PROCEDURE — 999N000157 HC STATISTIC RCP TIME EA 10 MIN

## 2023-03-09 PROCEDURE — 120N000002 HC R&B MED SURG/OB UMMC

## 2023-03-09 PROCEDURE — 250N000013 HC RX MED GY IP 250 OP 250 PS 637: Performed by: PHYSICIAN ASSISTANT

## 2023-03-09 PROCEDURE — 82310 ASSAY OF CALCIUM: CPT | Performed by: SURGERY

## 2023-03-09 PROCEDURE — 97165 OT EVAL LOW COMPLEX 30 MIN: CPT | Mod: GO

## 2023-03-09 PROCEDURE — 97530 THERAPEUTIC ACTIVITIES: CPT | Mod: GO

## 2023-03-09 PROCEDURE — 250N000013 HC RX MED GY IP 250 OP 250 PS 637: Performed by: STUDENT IN AN ORGANIZED HEALTH CARE EDUCATION/TRAINING PROGRAM

## 2023-03-09 RX ORDER — ACETAMINOPHEN 500 MG
1000 TABLET ORAL EVERY 6 HOURS
Qty: 100 TABLET | Refills: 0 | Status: SHIPPED | OUTPATIENT
Start: 2023-03-10

## 2023-03-09 RX ORDER — AMLODIPINE BESYLATE 10 MG/1
10 TABLET ORAL EVERY MORNING
Status: DISCONTINUED | OUTPATIENT
Start: 2023-03-09 | End: 2023-03-11 | Stop reason: HOSPADM

## 2023-03-09 RX ORDER — METHOCARBAMOL 500 MG/1
500 TABLET, FILM COATED ORAL 4 TIMES DAILY
Qty: 50 TABLET | Refills: 0 | Status: SHIPPED | OUTPATIENT
Start: 2023-03-10 | End: 2024-05-22

## 2023-03-09 RX ORDER — GABAPENTIN 100 MG/1
100 CAPSULE ORAL AT BEDTIME
Qty: 14 CAPSULE | Refills: 0 | Status: SHIPPED | OUTPATIENT
Start: 2023-03-10 | End: 2024-05-22

## 2023-03-09 RX ORDER — HYDROCHLOROTHIAZIDE 25 MG/1
25 TABLET ORAL EVERY MORNING
Status: DISCONTINUED | OUTPATIENT
Start: 2023-03-09 | End: 2023-03-11 | Stop reason: HOSPADM

## 2023-03-09 RX ORDER — HYDROXYZINE HYDROCHLORIDE 25 MG/1
25 TABLET, FILM COATED ORAL EVERY 6 HOURS PRN
Status: DISCONTINUED | OUTPATIENT
Start: 2023-03-09 | End: 2023-03-11 | Stop reason: HOSPADM

## 2023-03-09 RX ORDER — HYDROXYZINE HYDROCHLORIDE 25 MG/1
25 TABLET, FILM COATED ORAL EVERY 8 HOURS PRN
Qty: 5 TABLET | Refills: 0 | Status: SHIPPED | OUTPATIENT
Start: 2023-03-10 | End: 2024-05-22

## 2023-03-09 RX ADMIN — HYDROXYZINE HYDROCHLORIDE 25 MG: 25 TABLET, FILM COATED ORAL at 03:22

## 2023-03-09 RX ADMIN — ACETAMINOPHEN 1000 MG: 500 TABLET ORAL at 15:44

## 2023-03-09 RX ADMIN — ACETAMINOPHEN 1000 MG: 500 TABLET ORAL at 22:23

## 2023-03-09 RX ADMIN — ENOXAPARIN SODIUM 40 MG: 40 INJECTION SUBCUTANEOUS at 08:52

## 2023-03-09 RX ADMIN — METHOCARBAMOL 500 MG: 500 TABLET ORAL at 20:02

## 2023-03-09 RX ADMIN — ACETAMINOPHEN 1000 MG: 500 TABLET ORAL at 03:15

## 2023-03-09 RX ADMIN — ACETAMINOPHEN 1000 MG: 500 TABLET ORAL at 10:41

## 2023-03-09 RX ADMIN — ONDANSETRON 4 MG: 2 INJECTION INTRAMUSCULAR; INTRAVENOUS at 03:58

## 2023-03-09 RX ADMIN — HYDROCHLOROTHIAZIDE 25 MG: 25 TABLET ORAL at 09:05

## 2023-03-09 RX ADMIN — OXYCODONE HYDROCHLORIDE 5 MG: 5 TABLET ORAL at 22:27

## 2023-03-09 RX ADMIN — SODIUM CHLORIDE, POTASSIUM CHLORIDE, SODIUM LACTATE AND CALCIUM CHLORIDE: 600; 310; 30; 20 INJECTION, SOLUTION INTRAVENOUS at 04:00

## 2023-03-09 RX ADMIN — AMLODIPINE BESYLATE 10 MG: 10 TABLET ORAL at 09:05

## 2023-03-09 RX ADMIN — METHOCARBAMOL 500 MG: 500 TABLET ORAL at 08:52

## 2023-03-09 RX ADMIN — ATORVASTATIN CALCIUM 20 MG: 20 TABLET, FILM COATED ORAL at 20:02

## 2023-03-09 RX ADMIN — METHOCARBAMOL 500 MG: 500 TABLET ORAL at 12:51

## 2023-03-09 RX ADMIN — GABAPENTIN 100 MG: 100 CAPSULE ORAL at 22:23

## 2023-03-09 RX ADMIN — METHOCARBAMOL 500 MG: 500 TABLET ORAL at 15:44

## 2023-03-09 ASSESSMENT — ACTIVITIES OF DAILY LIVING (ADL)
ADLS_ACUITY_SCORE: 26
ADLS_ACUITY_SCORE: 22
ADLS_ACUITY_SCORE: 26
PREVIOUS_RESPONSIBILITIES: MEAL PREP;LAUNDRY;HOUSEKEEPING;SHOPPING;YARDWORK;MEDICATION MANAGEMENT;FINANCES;DRIVING;WORK
ADLS_ACUITY_SCORE: 26
ADLS_ACUITY_SCORE: 26
IADL_COMMENTS: PT REPORTS IND WITH IADLS AT BASELINE.
ADLS_ACUITY_SCORE: 26

## 2023-03-09 NOTE — PLAN OF CARE
[id number]    POD 1 laparoscopic right hemicolectomy with umbilical hernia repair. Pt hypertensive- team is aware, and on 1.5 LPM, otherwise VSS, and A&Ox4. Tolerating clear liquid diet, PRN zofran given 1x. Pain controlled with scheduled tylenol, and PRN atarax given 1x. Abdominal lap sites with liquid bandage that are clean, dry and intact- abdominal binder in place. Pt had good urine output via wilkerson, but no gas or BM during shift. Pt did not get up during shift, but is a standby assist with gait belt. PIV infusing LR at 50 mL/hr.

## 2023-03-09 NOTE — PLAN OF CARE
Goal Outcome Evaluation:         1445- 2300    BP (!) 178/103 (BP Location: Right arm)   Pulse 75   Temp 99.6  F (37.6  C) (Oral)   Resp 16   SpO2 99%        Reason for admission: S/P Day # 0 Laparoscopic right hemicolectomy, umbilical hernia repair.  Neuro: A&Ox4.   Cardiac: SR. VSS.   Respiratory: Sating 94% RA, applied oxygen at 2 liters at bedtime.  GI/: No bm, Adequate urine output via wilkerson.  Diet/appetite: Tolerating clears. One episode of emesis (green liquids), zofran was given.  Activity:  Stand by assist with gait belt in vázquez.  Pain: At acceptable level on current regimen.   Skin: No new deficits noted.  LDA's:PIV infusing LR as ordered.    Plan: Continue with POC. Notify primary team with changes.

## 2023-03-09 NOTE — PROGRESS NOTES
Post Op Check    03/09/2023    Edin Robles is a 67 year old male with h/o malignant neoplasm of ascending colon now POD#0 s/p laparoscopic right hemicolectomy and umbilical hernia repair.    Pt was sleeping comfortably. Did not wake up with normal vital signs satting appropriately.    BP (!) 139/92 (BP Location: Right arm)   Pulse 68   Temp 97.6  F (36.4  C) (Temporal)   Resp 16   SpO2 96%     A/P: No acute post-op issues. Continue plan of care per primary team. Please call with any questions.    Moncho Keller MD  Surgery resident

## 2023-03-09 NOTE — PLAN OF CARE
Goal Outcome Evaluation:         BP (!) 133/91 (BP Location: Left arm)   Pulse 78   Temp 98.4  F (36.9  C) (Oral)   Resp 16   SpO2 95%     [id number]     Reason for admission : s/p D # 2 Lap right hemicolectomy, umbilical hernia repair.  Neuro: A&Ox4.   Cardiac: SR. VSS.   Respiratory: Sating 95% on RA.  GI/: No bm. Adequate urine output.   Diet/appetite: Tolerating low fiber diet. Eating poorly.  Activity:  Stand by assist of one up to chair and in halls.  Pain: At acceptable level on current regimen.   Skin: No new deficits noted. Lap sites c/d/i.  LDA's:PIV saline locked.  New this shift: Edwards was removed around 1100 and pt is voiding without any problem.    Plan: Continue with POC. Notify primary team with changes.

## 2023-03-09 NOTE — PLAN OF CARE
Orders received and acknowledged. Per chart review, discussion with care team, and reason for admission, no acute inpatient PT needs identified. Defer discharge recommendations to OT. Will complete orders at this time. Should there be a change in mobility status, please reconsult as appropriate. Thank you for this referral.     Li Gao, PT, DPT

## 2023-03-09 NOTE — PROGRESS NOTES
Colorectal Surgery Progress Note  [facility]  POD#1      Subjective:  Doing ok. Was not able to sleep well.  Had some hiccups overnight, none recently.  Drinking water.  No nausea.  Feels a little hungry but also a little bloated with maybe some indigestion.  No gas/stool.     Vitals:  Vitals:    03/08/23 2140 03/08/23 2334 03/09/23 0559 03/09/23 0802   BP: (!) 146/99 (!) 139/92 (!) 161/108 (!) 154/107   BP Location: Right arm Right arm Right arm Left arm   Pulse: 67 68 71 66   Resp:  16 15 16   Temp:  97.6  F (36.4  C) 99.1  F (37.3  C) 99  F (37.2  C)   TempSrc:  Temporal Temporal Temporal   SpO2:  96% 97% 96%     I/O:  I/O last 3 completed shifts:  In: 3100.83 [P.O.:350; I.V.:2750.83]  Out: 4680 [Urine:4650; Blood:30]    Physical Exam:  Gen: AAOx3, NAD  Pulm: Non-labored breathing  Abd: Soft, mildly distended, appropriately tender, no guarding/rebound   Incision C/D/I   Ext:  Warm and well-perfused    BMP  Recent Labs   Lab 03/09/23  0657 03/08/23  1534 03/08/23  0634 03/07/23  1213     --   --  139   POTASSIUM 3.7  --   --  4.2   CHLORIDE 103  --   --  104   CO2 22  --   --  25   BUN 10.6  --   --  18.9   CR 0.84 0.92  --  0.94   *  --  113* 86   MAG 2.0  --   --   --      CBC  Recent Labs   Lab 03/09/23  0657 03/07/23  1213   WBC 15.3* 9.7   HGB 14.7 14.7   HCT 45.5 44.7    209         ASSESSMENT: This is a 67 year old male PMH HTN, tobacco use, found endoscopically unresectable colonic polyp in ascending colon that is suspicious for malignancy.  Now s/p Lap R colectomy on 3/8/2023.    Neuro/Pain: atarax, robaxin, maldonado, tylenol,, oxy  CV: resume home hydrochlorothiazide, amlodipine  PULM: encourage IS.  GI/FEN:   - CLD and if tolerates can adv to LRD later today  - stop IVF  - ambulate at least QID  - replete electrolytes   : remove wilkerson today  Heme: Hgb 14.7 from baseline 14-15  ID: no acute concern at this time  Endocrine: no acute concern at this  time    Activity: as tolerated.  Ppx: lovenox ppx  Dispo: pending ROBF, in 1-2 days.  Will discuss whether lovenox ppx is needed on discharge.       Lauren Zapien PA-C ..................3/9/2023   8:42 AM  Colon and Rectal Surgery    Patient was seen and discussed with Dr. Garsia    The above plan of care was performed and communicated to me by Dr. Carey    I spent 35 minute face-to-face or coordinating care of Edin Robles. Over 50% of our time on the unit was spent counseling the patient and/or coordinating care as documented in the assessment and plan.     [id number] post op hospital visit

## 2023-03-09 NOTE — PROGRESS NOTES
Transfer  Transferred from: Arrived to  from PACU. S/P Hemicolectomy, right, laparoscopy assisted herniorrhaphy umbilical repair. Lap sites clean,dry and intact.  Via:bed.  Reason for transfer:Pt appropriate for 7C- improved patient condition  Family: Aware of transfer  Belongings: Sent with pt  Chart: Sent with pt.

## 2023-03-09 NOTE — DISCHARGE SUMMARY
[facility]  Discharge Summary  Colon and Rectal Surgery     Edin Robles MRN# [id number]   YOB: 1955 Age: 67 year old     Date of Admission:  3/8/2023  Date of Discharge::  3/11/2023  Admitting Physician:  Nir Escobar MD  Discharge Physician:  Nir Escobar MD  Primary Care Physician:        No Ref-Primary, Physician          Admission Diagnoses:   Malignant neoplasm of ascending colon (H) [C18.2]  Colon cancer (H) [C18.9]    hypertensino  History of tobacco use  Cocaine abuse in remission          Discharge Diagnosis:   Malignant neoplasm of ascending colon (H) [C18.2]  Colon cancer (H) [C18.9]  Colon polyp, likely malignant based on colonoscopy report, endoscopically unresectable    hypertensino  History of tobacco use  Cocaine abuse in remission           Procedures:   3/8/2023:   PROCEDURE:  1. Laparoscopic right colectomy.  2. Umbilical hernia     ANESTHESIA: General endotracheal anesthesia plus local anesthesia.            Consultations:   OCCUPATIONAL THERAPY ADULT IP CONSULT  PHYSICAL THERAPY ADULT IP CONSULT  CARE MANAGEMENT / SOCIAL WORK IP CONSULT         Imaging Studies:     Results for orders placed or performed during the hospital encounter of 03/08/23   POC US Guidance Needle Placement    Narrative    Bilateral TAP block    Impression    Bilateral TAP block             Medications Prior to Admission:     Medications Prior to Admission   Medication Sig Dispense Refill Last Dose     Moringa Oleifera (MORINGA PO) Take by mouth every morning   Unknown     amLODIPine (NORVASC) 10 MG tablet Take 1 tablet (10 mg) by mouth daily (Patient taking differently: Take 10 mg by mouth every morning) 90 tablet 1 3/6/2023 at 0800     atorvastatin (LIPITOR) 20 MG tablet Take 1 tablet (20 mg) by mouth daily (Patient taking differently: Take 20 mg by mouth every morning) 90 tablet 2 3/6/2023 at 2100     Bioflavonoid Products (VITAMIN C) CHEW Take  "by mouth every morning   3/6/2023 at 0800     calcium carbonate-vitamin D (OSCAL) 250-3.125 MG-MCG TABS per tablet Take 1 tablet by mouth every morning   3/5/2023 at 0800     hydrochlorothiazide (HYDRODIURIL) 25 MG tablet Take 1 tablet (25 mg) by mouth daily (Patient taking differently: Take 25 mg by mouth every morning) 90 tablet 1 3/6/2023 at 0800     Omega-3 Fatty Acids (FISH OIL) 1200 MG capsule Take 1,200 mg by mouth every morning   3/6/2023 at 0800     TURMERIC PO Take by mouth every morning   3/6/2023 at 0800     UNABLE TO FIND every morning MEDICATION NAME: Neem   3/6/2023 at 0800     vitamin B complex with vitamin C (STRESS TAB) tablet Take 1 tablet by mouth every morning   3/6/2023 at 0800     [DISCONTINUED] ibuprofen (ADVIL/MOTRIN) 100 MG tablet Take 100 mg by mouth every 4 hours as needed   Past Week     [DISCONTINUED] metroNIDAZOLE (FLAGYL) 500 MG tablet Take 1 tablet (500 mg) by mouth every 6 hours At 8:00 am, 2:00 pm, 8:00 pm the day prior to your surgery with neomycin and zofran. 3 tablet 0 3/7/2023 at 2000     [DISCONTINUED] naproxen (NAPROSYN) 125 MG/5ML suspension Take by mouth 2 times daily (Patient not taking: Reported on 3/1/2023)   3/6/2023 at 0800     [DISCONTINUED] neomycin (MYCIFRADIN) 500 MG tablet Take 2 tablets (1,000 mg) by mouth every 6 hours At 8:00 am, 2:00 pm, 8:00 pm the day prior to your surgery with flagyl and zofran. 6 tablet 0 3/7/2023 at 2000     [DISCONTINUED] ondansetron (ZOFRAN) 4 MG tablet Take 1 tablet (4 mg) by mouth every 6 hours At 8:00 am, 2:00 pm, 8:00 pm the day prior to your surgery with neomycin and flagyl. 3 tablet 0 3/7/2023 at 0800     [DISCONTINUED] polyethylene glycol (MIRALAX) 17 g packet Take 238 g by mouth See Admin Instructions Starting at 4 pm night prior to surgery. Refer to \"Getting Ready for Surgery\" instructions. 21 packet 0 3/7/2023 at 2300            Discharge Medications:     Current Discharge Medication List      START taking these medications    " Details   acetaminophen (TYLENOL) 500 MG tablet Take 2 tablets (1,000 mg) by mouth every 6 hours  Qty: 100 tablet, Refills: 0    Associated Diagnoses: Acute post-operative pain      gabapentin (NEURONTIN) 100 MG capsule Take 1 capsule (100 mg) by mouth At Bedtime  Qty: 14 capsule, Refills: 0    Associated Diagnoses: Acute post-operative pain      hydrOXYzine (ATARAX) 25 MG tablet Take 1 tablet (25 mg) by mouth every 8 hours as needed for anxiety  Qty: 5 tablet, Refills: 0    Associated Diagnoses: Acute post-operative pain      methocarbamol (ROBAXIN) 500 MG tablet Take 1 tablet (500 mg) by mouth 4 times daily  Qty: 50 tablet, Refills: 0    Associated Diagnoses: Acute post-operative pain      oxyCODONE (ROXICODONE) 5 MG tablet Take 1 tablet (5 mg) by mouth every 4 hours as needed for moderate pain (4-6)  Qty: 20 tablet, Refills: 0    Associated Diagnoses: Acute post-operative pain         CONTINUE these medications which have NOT CHANGED    Details   Moringa Oleifera (MORINGA PO) Take by mouth every morning      amLODIPine (NORVASC) 10 MG tablet Take 1 tablet (10 mg) by mouth daily  Qty: 90 tablet, Refills: 1    Associated Diagnoses: Primary hypertension; Raynaud's disease without gangrene      atorvastatin (LIPITOR) 20 MG tablet Take 1 tablet (20 mg) by mouth daily  Qty: 90 tablet, Refills: 2    Associated Diagnoses: Hyperlipidemia, unspecified hyperlipidemia type      Bioflavonoid Products (VITAMIN C) CHEW Take by mouth every morning      calcium carbonate-vitamin D (OSCAL) 250-3.125 MG-MCG TABS per tablet Take 1 tablet by mouth every morning      hydrochlorothiazide (HYDRODIURIL) 25 MG tablet Take 1 tablet (25 mg) by mouth daily  Qty: 90 tablet, Refills: 1    Associated Diagnoses: Primary hypertension      Omega-3 Fatty Acids (FISH OIL) 1200 MG capsule Take 1,200 mg by mouth every morning      TURMERIC PO Take by mouth every morning      UNABLE TO FIND every morning MEDICATION NAME: Neem      vitamin B complex  with vitamin C (STRESS TAB) tablet Take 1 tablet by mouth every morning         STOP taking these medications       ibuprofen (ADVIL/MOTRIN) 100 MG tablet Comments:   Reason for Stopping:         metroNIDAZOLE (FLAGYL) 500 MG tablet Comments:   Reason for Stopping:         naproxen (NAPROSYN) 125 MG/5ML suspension Comments:   Reason for Stopping:         neomycin (MYCIFRADIN) 500 MG tablet Comments:   Reason for Stopping:         ondansetron (ZOFRAN) 4 MG tablet Comments:   Reason for Stopping:         polyethylene glycol (MIRALAX) 17 g packet Comments:   Reason for Stopping:                     Brief History of Illness:   This is a 67 year old male PMH HTN, tobacco use, found endoscopically unresectable colonic polyp in ascending colon that is suspicious for malignancy.  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  Now s/p Lap R colectomy on 3/8/2023.           Hospital Course:   Post-operatively pt was gently fluid resuscitated.  Edwards was removed and pt was able to void.  Pt had eventual return of bowel function and was able to tolerate small amounts of a low fiber diet.     Pt was seen by PT/OT and deemed appropriate for discharge home. scheduled tylenol, gabapentin, robaxin and prn oxycodone and atarax.    Patient is to follow up in the Colon and Rectal Surgery Clinic in 2-3 week with Kari Cordova PA-C on 3/29 and then with Dr. Escobar on 5/4.         Day of Discharge Physical Exam:   Blood pressure (!) 151/104, pulse 97, temperature 99.1  F (37.3  C), temperature source Oral, resp. rate 18, SpO2 97 %.    Gen: AAOx3, NAD  Pulm: Non-labored breathing  Abd: Soft, appropriately tender, no guarding/rebound   Incision C/D/I   Ext:  Warm and well-perfused         Final Pathology Result:   Pending at time of discharge           Discharge Instructions and Follow-Up:     Discharge Procedure Orders   Reason for your hospital stay   Order Comments: 3/8/2023:   PROCEDURE:  1.  Laparoscopic right colectomy.  2. Umbilical hernia     ANESTHESIA: General endotracheal anesthesia plus local anesthesia.     Activity   Order Comments: Your activity upon discharge:   ACTIVITY  -No lifting, pushing, pulling greater than 10 lbs and no strenuous exercise for 6 weeks   -No driving while on narcotic analgesics (i.e. Percocet, oxycodone, Vicodin)  -No driving until you are able to fully twist to both sides or slam on brakes quickly and without any pain  -We encourage walking at least 4-5 times per day     Order Specific Question Answer Comments   Is discharge order? Yes      [facility] Follow-up and recommended labs and tests   Order Comments: WOUND CARE  -Inspect your wounds daily for signs of infection (increased redness, drainage, pain)  -Keep your wound clean and dry  -You may shower, but do not soak in tub or pool    NOTIFY  Please contact Jyoti Powers RN or Naomi Park RN at [phone] for problems after discharge such as:  -Temperature > 101F, chills, rigors, dizziness  -Redness around or purulent drainage from wound  -Inability to tolerate diet, nausea or vomiting  -You stop passing gas, develop significant bloating, abdominal pain  -Have blood in stools/vomit  -Have severe diarrhea/constipation  -Any other questions or concerns.  - At nights (after 4:30pm), on weekends, or if urgent, call [phone] and ask the  to speak with the on-call Colorectal Surgery resident or fellow      Medication Instructions  Some of your medications may have changed. Please take only prescribed and resumed medications     FOLLOW-UP  1.  You will need to follow-up with Kari Cordova PA-C in the Colon and Rectal Surgery clinic on 3/29 and then with CRS Staff: Dr. Nir Escobar on 5/4.  Please contact our Nurses (phone # [phone]) if you have not heard from our clinic in 3 business days afer discharge to schedule a follow-up appointment.    2.  Follow up with your primary care provider in 1-2  weeks after discharge from the hospital to review this hospitalization.       Appointments on [facility] and/or [facility] (with [facility] or [facility] provider or service). Call [phone] if you haven't heard regarding these appointments within 7 days of discharge.     Diet   Order Comments: Follow this diet upon discharge:   DIET  -Low Residue Diet for at least 4-6 weeks unless cleared by Colorectal surgery.  No raw vegetables, fruit skins, fibrous foods that require a lot of chewing, nuts, seeds, corn, popcorn.   -We recommend eating slowly, chewing thoroughly, eating small frequent meals throughout the day  -Stay well hydrated.     Order Specific Question Answer Comments   Is discharge order? Yes             Home Health Care:     Not needed           Discharge Disposition:     Discharged to home      Condition at discharge: Stable      Discussed with Dr. Brennan Vivas MD  Fellow in Colon and Rectal Surgery  [facility]   Pager [phone]

## 2023-03-10 ENCOUNTER — APPOINTMENT (OUTPATIENT)
Dept: OCCUPATIONAL THERAPY | Facility: CLINIC | Age: 68
DRG: 331 | End: 2023-03-10
Attending: SURGERY
Payer: COMMERCIAL

## 2023-03-10 LAB — GLUCOSE BLDC GLUCOMTR-MCNC: 151 MG/DL (ref 70–99)

## 2023-03-10 PROCEDURE — 120N000002 HC R&B MED SURG/OB UMMC

## 2023-03-10 PROCEDURE — 250N000011 HC RX IP 250 OP 636: Performed by: SURGERY

## 2023-03-10 PROCEDURE — 250N000013 HC RX MED GY IP 250 OP 250 PS 637: Performed by: PHYSICIAN ASSISTANT

## 2023-03-10 PROCEDURE — 97530 THERAPEUTIC ACTIVITIES: CPT | Mod: GO

## 2023-03-10 PROCEDURE — 250N000013 HC RX MED GY IP 250 OP 250 PS 637: Performed by: SURGERY

## 2023-03-10 RX ORDER — LABETALOL HYDROCHLORIDE 5 MG/ML
10 INJECTION, SOLUTION INTRAVENOUS EVERY 4 HOURS PRN
Status: DISCONTINUED | OUTPATIENT
Start: 2023-03-10 | End: 2023-03-11 | Stop reason: HOSPADM

## 2023-03-10 RX ORDER — OXYCODONE HYDROCHLORIDE 5 MG/1
5 TABLET ORAL EVERY 4 HOURS PRN
Qty: 20 TABLET | Refills: 0
Start: 2023-03-10 | End: 2023-03-11

## 2023-03-10 RX ADMIN — AMLODIPINE BESYLATE 10 MG: 10 TABLET ORAL at 08:09

## 2023-03-10 RX ADMIN — OXYCODONE HYDROCHLORIDE 10 MG: 10 TABLET ORAL at 20:18

## 2023-03-10 RX ADMIN — OXYCODONE HYDROCHLORIDE 5 MG: 5 TABLET ORAL at 15:41

## 2023-03-10 RX ADMIN — METHOCARBAMOL 500 MG: 500 TABLET ORAL at 15:41

## 2023-03-10 RX ADMIN — ENOXAPARIN SODIUM 40 MG: 40 INJECTION SUBCUTANEOUS at 08:09

## 2023-03-10 RX ADMIN — METHOCARBAMOL 500 MG: 500 TABLET ORAL at 20:18

## 2023-03-10 RX ADMIN — ATORVASTATIN CALCIUM 20 MG: 20 TABLET, FILM COATED ORAL at 20:18

## 2023-03-10 RX ADMIN — ONDANSETRON 4 MG: 4 TABLET, ORALLY DISINTEGRATING ORAL at 16:23

## 2023-03-10 RX ADMIN — HYDROCHLOROTHIAZIDE 25 MG: 25 TABLET ORAL at 08:09

## 2023-03-10 RX ADMIN — GABAPENTIN 100 MG: 100 CAPSULE ORAL at 22:06

## 2023-03-10 RX ADMIN — ACETAMINOPHEN 1000 MG: 500 TABLET ORAL at 11:57

## 2023-03-10 RX ADMIN — METHOCARBAMOL 500 MG: 500 TABLET ORAL at 11:57

## 2023-03-10 RX ADMIN — ACETAMINOPHEN 1000 MG: 500 TABLET ORAL at 03:02

## 2023-03-10 RX ADMIN — METHOCARBAMOL 500 MG: 500 TABLET ORAL at 08:09

## 2023-03-10 ASSESSMENT — ACTIVITIES OF DAILY LIVING (ADL)
ADLS_ACUITY_SCORE: 26
ADLS_ACUITY_SCORE: 25
ADLS_ACUITY_SCORE: 26
ADLS_ACUITY_SCORE: 25
ADLS_ACUITY_SCORE: 26
DEPENDENT_IADLS:: INDEPENDENT
ADLS_ACUITY_SCORE: 25
ADLS_ACUITY_SCORE: 26
ADLS_ACUITY_SCORE: 25

## 2023-03-10 NOTE — PLAN OF CARE
[id number]    POD 2 laparoscopic right hemicolectomy with umbilical hernia repair. Pt hypertensive, otherwise VSS on RA and A&Ox4. Up with standby assist and gait belt. Tolerating low fiber diet and denies nausea. Pt not passing gas, and did not have a BM this shift. Pt voiding spontaneously with good urine output. Pain controlled with scheduled tylenol. Abdominal lap sites with clear liquid bandages are clean, dry and intact, with the abdominal binder in place. PIV saline locked.

## 2023-03-10 NOTE — CONSULTS
Care Management Initial Consult    General Information  Assessment completed with: Patient,    Type of CM/SW Visit: Initial Assessment    Primary Care Provider verified and updated as needed: Yes   Readmission within the last 30 days: no previous admission in last 30 days      Reason for Consult: discharge planning  Advance Care Planning: Advance Care Planning Reviewed: no concerns identified, concerns discussed          Communication Assessment  Patient's communication style: spoken language (English or Bilingual)    Hearing Difficulty or Deaf: no   Wear Glasses or Blind: no    Cognitive  Cognitive/Neuro/Behavioral: WDL                      Living Environment:   People in home: spouse     Current living Arrangements: house      Able to return to prior arrangements: yes       Family/Social Support:  Care provided by: self, spouse/significant other  Provides care for: no one  Marital Status:   Wife, Sibling(s)          Description of Support System: Supportive    Support Assessment: Adequate family and caregiver support    Current Resources:   Patient receiving home care services: No     Community Resources: None  Equipment currently used at home: grab bar, tub/shower  Supplies currently used at home: None    Employment/Financial:  Employment Status: employed full-time        Financial Concerns:     Referral to Financial Worker: No       Lifestyle & Psychosocial Needs:  Social Determinants of Health     Tobacco Use: Medium Risk     Smoking Tobacco Use: Former     Smokeless Tobacco Use: Never     Passive Exposure: Not on file   Alcohol Use: Not on file   Financial Resource Strain: Not on file   Food Insecurity: Not on file   Transportation Needs: Not on file   Physical Activity: Not on file   Stress: Not on file   Social Connections: Not on file   Intimate Partner Violence: Not on file   Depression: Not at risk     PHQ-2 Score: 2   Housing Stability: Not on file       Functional Status:  Prior to admission  patient needed assistance:   Dependent ADLs:: Independent  Dependent IADLs:: Independent  Assesssment of Functional Status: At functional baseline    Mental Health Status:  Mental Health Status: No Current Concerns       Chemical Dependency Status:  Chemical Dependency Status: No Current Concerns             Values/Beliefs:  Spiritual, Cultural Beliefs, [employer] Practices, Values that affect care:                 Additional Information:  Met with pt at bedside for CM assessment. Pt had no PCP on file but attest that his PCP is with [facility]. CM contact registration and request for PCP update to support internal hand off post hospitalization.     Discuss the need for ACP and pt accept to be provided with blank copies of HCD for his review and completion.     Assess for psychosocial concern and pt report no concern. No discharge need anticipated at this time.      Meme Chris, 7C [facility]  P:   Pager: [phone]  F: [phone]  Weekend Pager: [phone]  Weekend Coverage: 7C; 7D; 5C

## 2023-03-10 NOTE — PROGRESS NOTES
Colorectal Surgery Progress Note  [facility]  POD#4      Subjective:  Patient reports doing alright. He has a small appetite but is tolerating his diet without vomiting (some small amount of nausea). He is urinating appropriately, and is not yet passing flatus or having a BM. He is ambulating around the fournier without difficulty.       Vitals:  Vitals:    03/09/23 1401 03/09/23 1951 03/09/23 2205 03/10/23 0737   BP: (!) 133/91 (!) 148/103 (!) 162/100 (!) 134/96   BP Location: Left arm Right arm Left arm Left arm   Pulse: 78 80 78 78   Resp: 16  18 19   Temp: 98.4  F (36.9  C) 98.4  F (36.9  C) 97.5  F (36.4  C) 97.9  F (36.6  C)   TempSrc: Oral Oral Oral Temporal   SpO2: 95% 97% 95% 97%     I/O:  I/O last 3 completed shifts:  In: 950 [P.O.:950]  Out: 2775 [Urine:2775]    Physical Exam:  Gen: AAOx3, NAD  Pulm: Non-labored breathing on RA  Abd: Soft, mildly distended, appropriately tender, no guarding/rebound   Incision C/D/I closed with dermabond  Ext:  Warm and well-perfused    BMP  Recent Labs   Lab 03/09/23  0945 03/09/23  0657 03/08/23  1534 03/08/23  0634 03/07/23  1213   NA  --  137  --   --  139   POTASSIUM  --  3.7  --   --  4.2   CHLORIDE  --  103  --   --  104   CO2  --  22  --   --  25   BUN  --  10.6  --   --  18.9   CR  --  0.84 0.92  --  0.94   * 130*  --  113* 86   MAG  --  2.0  --   --   --      CBC  Recent Labs   Lab 03/09/23  0657 03/07/23  1213   WBC 15.3* 9.7   HGB 14.7 14.7   HCT 45.5 44.7    209         ASSESSMENT: This is a 67 year old male PMH HTN, tobacco use, found endoscopically unresectable colonic polyp in ascending colon that is suspicious for malignancy.  Now s/p Lap R colectomy on 3/8/2023.    Neuro/Pain: atarax, robaxin, maldonado, tylenol, oxy  CV:  home hydrochlorothiazide, amlodipine, PRN labetalol   PULM: encourage IS.  GI/FEN:   - Low fiber diet  - ambulate at least QID  : Urinating spontaneously  Heme: Hgb 14.7 from baseline 14-15, no need  to recheck  ID: no acute concern at this time  Endocrine: no acute concern at this time  Activity: as tolerated.  Ppx: lovenox ppx  Dispo: pending ROBF, could be as early as today. Will only give outpatient lovenox if pathology is malignant    Chase Gray MD  Intern, General Surgery  Colorectal Intern     Patient was seen and discussed with Dr. Garsia who will discuss with Dr. Rooney

## 2023-03-10 NOTE — PLAN OF CARE
Goal Outcome Evaluation:      Plan of Care Reviewed With: patient    Overall Patient Progress: improvingOverall Patient Progress: improving    Outcome Evaluation: A&Ox4. Completed OT this shift. SBA, ambulating to bathroom and in the hallway. BM this shift, voids spontaneously. PIV s/l. Low fiber diet, fair appetite. Midline incision with lap sites PEDRO, abdominal binder in place for comfort. Used heat and cold pack for pain management, with scheduled pain medicine. Continue plan of care.

## 2023-03-11 VITALS
OXYGEN SATURATION: 95 % | TEMPERATURE: 98.5 F | HEART RATE: 64 BPM | SYSTOLIC BLOOD PRESSURE: 122 MMHG | RESPIRATION RATE: 18 BRPM | DIASTOLIC BLOOD PRESSURE: 88 MMHG

## 2023-03-11 LAB
HOLD SPECIMEN: NORMAL
PLATELET # BLD AUTO: 249 10E3/UL (ref 150–450)

## 2023-03-11 PROCEDURE — 250N000011 HC RX IP 250 OP 636: Performed by: SURGERY

## 2023-03-11 PROCEDURE — 250N000013 HC RX MED GY IP 250 OP 250 PS 637: Performed by: PHYSICIAN ASSISTANT

## 2023-03-11 PROCEDURE — 85049 AUTOMATED PLATELET COUNT: CPT | Performed by: SURGERY

## 2023-03-11 PROCEDURE — 36415 COLL VENOUS BLD VENIPUNCTURE: CPT | Performed by: SURGERY

## 2023-03-11 PROCEDURE — 250N000013 HC RX MED GY IP 250 OP 250 PS 637: Performed by: SURGERY

## 2023-03-11 RX ORDER — OXYCODONE HYDROCHLORIDE 5 MG/1
5 TABLET ORAL EVERY 4 HOURS PRN
Qty: 20 TABLET | Refills: 0 | Status: SHIPPED | OUTPATIENT
Start: 2023-03-11 | End: 2024-05-22

## 2023-03-11 RX ADMIN — ACETAMINOPHEN 1000 MG: 500 TABLET ORAL at 05:18

## 2023-03-11 RX ADMIN — HYDROCHLOROTHIAZIDE 25 MG: 25 TABLET ORAL at 08:35

## 2023-03-11 RX ADMIN — ENOXAPARIN SODIUM 40 MG: 40 INJECTION SUBCUTANEOUS at 08:35

## 2023-03-11 RX ADMIN — METHOCARBAMOL 500 MG: 500 TABLET ORAL at 08:35

## 2023-03-11 RX ADMIN — AMLODIPINE BESYLATE 10 MG: 10 TABLET ORAL at 08:35

## 2023-03-11 ASSESSMENT — ACTIVITIES OF DAILY LIVING (ADL)
ADLS_ACUITY_SCORE: 25

## 2023-03-11 NOTE — CARE PLAN
Blood pressure 122/88, pulse 64, temperature 98.5  F (36.9  C), temperature source Temporal, resp. rate 18, SpO2 95 %.    Discharge time/Plan: Pt to leave around 1200. Discharging home with wife.    AVS discussion: AVS discussed with pt and wife at the bedside. Pt understood all education regarding discharge and had no further questions.    Acess/Lines: L PIV  Removed, has no other active lines    Supplies: No supplies required for discharge    Medication: Pt to  prescriptions at discharge pharmacy prior to leaving hospital.    Pt Transfers: Will transport to lobby/pharmacy in wheelchair per pt request      Alert, pain controlled, understands education,     Pt adequate for discharge

## 2023-03-11 NOTE — PLAN OF CARE
Occupational Therapy Discharge Summary    Reason for therapy discharge:    Discharged to home.    Progress towards therapy goal(s). [facility] for goal details.  Goals partially met.  Barriers to achieving goals:   discharge from facility.    Therapy recommendation(s):    No further therapy is recommended.

## 2023-03-11 NOTE — PLAN OF CARE
15:00-23:30    Goal Outcome Evaluation:    Plan of Care Reviewed With: patient    Overall Patient Progress: improving      Temp: 99.1  F (37.3  C) Temp src: Oral BP: (!) 151/104 Pulse: 97   Resp: 18 SpO2: 97 % O2 Device: None (Room air)      Status:  S/P Lap right hemicolectomy and umbilical hernia repair, Day # 2    -pain controlled with Robaxin, Gabapentin and Oxycodone.  Tylenol not given because Tylenol makes patient nauseous   -C/O nausea with emesis, given Zofran ODT with relief  -tolerating low fiber with fair appetite  -PIV saline locked  -abd lap sites/incision with bruising and liquid bandage   -had a small watery stool today  -voiding good amount in urinal  -dyspneic with activity, lung diminished, using IS  -up ad toshia    Continue with plan of care.

## 2023-03-11 NOTE — PLAN OF CARE
Goal Outcome Evaluation:    RN assumed cares [id number]    Pt A&Ox4, VSS on RA. PIV SL. Lap sites and midline incision PEDRO with liquid bandage. Pt wearing abd binder. Pt denies pain and nausea, receiving scheduled Tylenol. No BM during shift. Pt resting between cares. Will continue to monitor and follow the plan of care.

## 2023-03-13 ENCOUNTER — PATIENT OUTREACH (OUTPATIENT)
Dept: SURGERY | Facility: CLINIC | Age: 68
End: 2023-03-13
Payer: COMMERCIAL

## 2023-03-13 DIAGNOSIS — C18.2 MALIGNANT NEOPLASM OF ASCENDING COLON (H): Primary | ICD-10-CM

## 2023-03-13 LAB
LAB DIRECTOR COMMENTS: ABNORMAL
LAB DIRECTOR DISCLAIMER: ABNORMAL
LAB DIRECTOR INTERPRETATION: ABNORMAL
LAB DIRECTOR METHODOLOGY: ABNORMAL
LAB DIRECTOR RESULTS: ABNORMAL
PATH REPORT.ADDENDUM SPEC: ABNORMAL
PATH REPORT.COMMENTS IMP SPEC: ABNORMAL
PATH REPORT.COMMENTS IMP SPEC: ABNORMAL
PATH REPORT.COMMENTS IMP SPEC: YES
PATH REPORT.FINAL DX SPEC: ABNORMAL
PATH REPORT.GROSS SPEC: ABNORMAL
PATH REPORT.MICROSCOPIC SPEC OTHER STN: ABNORMAL
PATH REPORT.RELEVANT HX SPEC: ABNORMAL
PATHOLOGY SYNOPTIC REPORT: ABNORMAL
PHOTO IMAGE: ABNORMAL
SPECIMEN DESCRIPTION: ABNORMAL

## 2023-03-13 RX ORDER — ENOXAPARIN SODIUM 100 MG/ML
40 INJECTION SUBCUTANEOUS DAILY
Qty: 12 ML | Refills: 0 | Status: SHIPPED | OUTPATIENT
Start: 2023-03-13 | End: 2023-04-12

## 2023-03-13 NOTE — PROGRESS NOTES
I called Edin to review plan for lovenox. I ordered the medication to his requested pharmacy and told him he needs to come in for a nurse visit to learn the injection. He agreed to plan. I called him back to see if he picked up the medication but he said no because he is bed ridden and it will be a couple days. I stated that Dr. Escobar would like him to start this ASAP. I will call him tomorrow to see if he picked up the medication and schedule a nurse visit.

## 2023-03-14 PROCEDURE — 81288 MLH1 GENE: CPT | Performed by: SURGERY

## 2023-03-14 PROCEDURE — G0452 MOLECULAR PATHOLOGY INTERPR: HCPCS | Mod: 26 | Performed by: PATHOLOGY

## 2023-03-16 ENCOUNTER — ALLIED HEALTH/NURSE VISIT (OUTPATIENT)
Dept: SURGERY | Facility: CLINIC | Age: 68
End: 2023-03-16
Payer: COMMERCIAL

## 2023-03-16 DIAGNOSIS — C18.9 COLON CANCER (H): ICD-10-CM

## 2023-03-16 DIAGNOSIS — C18.2 MALIGNANT NEOPLASM OF ASCENDING COLON (H): Primary | ICD-10-CM

## 2023-03-16 DIAGNOSIS — Z71.9 ENCOUNTER FOR EDUCATION: Primary | ICD-10-CM

## 2023-03-16 PROCEDURE — 99207 PR NO CHARGE NURSE ONLY: CPT

## 2023-03-16 NOTE — PROGRESS NOTES
Patient is s/p Lap R colectomy on 3/8/2023. He was discharged on subcutaneous lovenox. He is here today for injection teaching. Edin arrives today with his own supply of lovenox. Provided step by step instructions for self injection. He denies any further questioning or concerns. Provided paper instructions for patient to take home as well. Discussed common side effects and red flag signs. He knows to contact the clinic with any further questions or concerns.

## 2023-03-22 ENCOUNTER — MYC MEDICAL ADVICE (OUTPATIENT)
Dept: SURGERY | Facility: CLINIC | Age: 68
End: 2023-03-22
Payer: COMMERCIAL

## 2023-03-23 NOTE — TELEPHONE ENCOUNTER
RECORDS STATUS - ALL OTHER DIAGNOSIS      RECORDS RECEIVED FROM: [facility] [employer]   DATE RECEIVED: 3/23   NOTES STATUS DETAILS   OFFICE NOTE from referring provider Nir Neville MD in [facility] COLON & RECTAL SURGERY   DISCHARGE SUMMARY from hospital [facility] 3/8/23   OPERATIVE REPORT Epic 3/8/23: Hemicolectomy  1/6/23: Colonoscopy   MEDICATION LIST [facility]    LABS     PATHOLOGY REPORTS [facility] 3/8/23, 1/6/23: Surg Path   ANYTHING RELATED TO DIAGNOSIS Epic 3/11/23   GENONOMIC TESTING     TYPE: Epic 3/13/23   IMAGING (NEED IMAGES & REPORT)     CT SCANS PACS 1/16/23, 12/14/22: [facility]    12/31/15, 12/29/15:    MRI PACS 9/4/20:    ULTRASOUND PACS 3/8/23, 12/14/22: Epic

## 2023-03-27 ENCOUNTER — PRE VISIT (OUTPATIENT)
Dept: ONCOLOGY | Facility: CLINIC | Age: 68
End: 2023-03-27
Payer: COMMERCIAL

## 2023-03-27 ENCOUNTER — ONCOLOGY VISIT (OUTPATIENT)
Dept: ONCOLOGY | Facility: CLINIC | Age: 68
End: 2023-03-27
Attending: SURGERY
Payer: COMMERCIAL

## 2023-03-27 VITALS
SYSTOLIC BLOOD PRESSURE: 114 MMHG | BODY MASS INDEX: 29.12 KG/M2 | DIASTOLIC BLOOD PRESSURE: 79 MMHG | HEIGHT: 71 IN | RESPIRATION RATE: 20 BRPM | WEIGHT: 208 LBS | HEART RATE: 75 BPM | OXYGEN SATURATION: 100 %

## 2023-03-27 DIAGNOSIS — C18.2 MALIGNANT NEOPLASM OF ASCENDING COLON (H): ICD-10-CM

## 2023-03-27 PROCEDURE — 99205 OFFICE O/P NEW HI 60 MIN: CPT | Performed by: STUDENT IN AN ORGANIZED HEALTH CARE EDUCATION/TRAINING PROGRAM

## 2023-03-27 PROCEDURE — G0463 HOSPITAL OUTPT CLINIC VISIT: HCPCS | Performed by: STUDENT IN AN ORGANIZED HEALTH CARE EDUCATION/TRAINING PROGRAM

## 2023-03-27 ASSESSMENT — PAIN SCALES - GENERAL: PAINLEVEL: MODERATE PAIN (5)

## 2023-03-27 NOTE — LETTER
3/27/2023         RE: Edin Robles  [address] [place] [id number]        Dear Colleague,    Thank you for referring your patient, Edin Robles, to the [facility]. Please see a copy of my visit note below.    [facility] - Medical Oncology New Outpatient Consult Note  3/27/2023    Patient Identifiers     Name: Edin Robles  Preferred Address: [place]  Preferred Language: English  : 1955  Gender: male    Assessment and Plan     Mr. Edin Robles is a 67 year old male former smoker with prior history of HTN and HLD who presents with newly diagnosed dMMR stage I colon cancer.    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 Escobar.  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  This finding, along with patient's age and negative family cancer history indicate no further genetic testing is required.  Based on patient's cancer stage there is no indication for routine clinical or imaging surveillance.  Patient should continue follow-up with colorectal surgery as scheduled for surveillance colonoscopies.  Patient should also follow-up with primary care doctor for other routine surveillance including symptomatic prostate screening and smoking-history-based targeted screening.    Plan:  Stage I dMMR CRC  - no routine Med Onc or imaging follow-up indicated  - f/u with CRS as scheduled for surveillance colonoscopies  - f/u with PMD for symptomatic prostate screening and targeted smoking-based cancer screening (none indicated at this time)    The patient asked numerous excellent questions which I answered to the best of my abilities. At the completion of our consultation, the patient and accompanying caregivers had a strong understanding of the plan. They have our contact information for any further questions or concerns, and know to  "reach out for any urgent matters. Patient and family aware that they should call 911 for emergencies.       60 minutes spent on the date of the encounter doing chart review, review of test results, interpretation of tests, patient visit and documentation       Gerardo Ahmadi MD, PhD   of Medicine  Division of Hematology, Oncology and Transplantation  [facility]    -----------------------------------    Oncology Summary and HPI      Cancer Staging   Colon cancer (H)  Staging form: Colon and Rectum, AJCC 8th Edition  - Pathologic stage from 3/13/2023: Stage I (pT2, pN0, cM0) - Signed by Gerardo Ahmadi MD on 3/27/2023      Oncology History   Colon cancer (H)   3/8/2023 Initial Diagnosis    Colon cancer (H)     3/13/2023 -  Cancer Staged    Staging form: Colon and Rectum, AJCC 8th Edition  - Pathologic stage from 3/13/2023: Stage I (pT2, pN0, cM0)         Subjective/Interval Events     - quit smoking 2 years ago without medical aid. Had smoked since age of 20. Smoked a few cigs daily.   - diarrhea for a couple of days since surgery. Has since resolved.     Review of Systems: 14 point ROS negative other than the symptoms noted above in the HPI.    Physical Exam     Vital signs: /79   Pulse 75   Resp 20   Ht 1.815 m (5' 11.46\")   Wt 94.3 kg (208 lb)   SpO2 100%   BMI 28.64 kg/m      ECOG performance status:  0  Vascular access:  none    GENERAL: Well-nourished healthy-appearing man, sitting in chair, no acute distress.   HEENT: No icterus, no pallor. Moist mucous membranes.   LUNGS: Clear to ausculation bilaterally, normal work of breathing.   CARDIOVASCULAR: Regular rate and rhythm, no murmurs, gallops or rubs.   ABDOMEN: Soft, nontender and nondistended.  EXTREMITIES: No cyanosis, no clubbing, no edema.   NEUROLOGIC: No focal deficits, alert/ oriented  LYMPH NODE EXAM: No palpable adenopathy.    Objective Data     Lab data:  I have personally reviewed the lab data, notable for: "    - WBC 15.3 and 9.7 (two values over three days)  - no other notables    Radiology data:  I have personally reviewed the radiology data, notable for:  01/16/2023  IMPRESSION: In this patient with history of mass of the ascending  colon:  1. No appreciable colonic mass. No evidence of metastatic disease in  the chest, abdomen, or pelvis.  2. Dilation of the ascending aorta measuring up to 4.5 cm.  3. Other chronic and incidental findings as detailed in the body of  the report:    Pathology and other data:  I have personally reviewed and interpreted the pathology data, notable for:    03/08/2023 Surg Path  A. UMBILICAL HERNIA CONTENTS, RESECTION:  -Benign fibroadipose tissue compatible with umbilical hernia, negative for malignancy    B. TERMINAL ILEUM, APPENDIX, ASCENDING COLON, PROXIMAL TRANSVERSE COLON, RESECTION:  -Moderately differentiated invasive adenocarcinoma of the ascending colon  -Tumor invades into the superficial portion of the muscularis propria  -All surgical margins are free of neoplasia  -Twenty-six benign lymph nodes (0/26)  -No evidence of lymphovascular or perineural spread  -0.7 cm tubular adenoma  -Benign appendix, unremarkable terminal ileum    Performed on paraffin block A1:     Protein Marker    Result  MLH1                   Loss of nuclear expression  MSH2                   Intact nuclear expression  MSH6                   Intact nuclear expression  PMS2                   Loss of nuclear expression    MLH1 promoter methylation: POSITIVE     Medical/Surgical History     Past medical history:  Active Ambulatory Problems     Diagnosis Date Noted     Major depressive disorder, recurrent episode, moderate (H) 08/02/2022     Chronic thoracic back pain, unspecified back pain laterality 08/21/2022     Primary hypertension 08/21/2022     Hyperlipidemia, unspecified hyperlipidemia type 08/21/2022     Low TSH level 08/21/2022     Cocaine abuse in remission (H) 12/06/2022     Pancreatic cyst  2022     Colon cancer (H) 2023     Resolved Ambulatory Problems     Diagnosis Date Noted     No Resolved Ambulatory Problems     Past Medical History:   Diagnosis Date     Depressive disorder      Hypertension        Past surgical history:  Past Surgical History:   Procedure Laterality Date     COLONOSCOPY N/A 2023    Procedure: COLONOSCOPY, WITH POLYPECTOMY AND BIOPSY;  Surgeon: Liborio Tucker MD;  Location: [facility] OR     COLONOSCOPY       EYE SURGERY       HERNIORRHAPHY UMBILICAL N/A 3/8/2023    Procedure: Herniorrhaphy umbilical Repair;  Surgeon: Nir Escobar MD;  Location: [facility] OR        Social history:   Social History     Tobacco Use     Smoking status: Former     Types: Cigarettes     Quit date: 2021     Years since quittin.6     Smokeless tobacco: Never   Vaping Use     Vaping Use: Never used   Substance Use Topics     Alcohol use: Yes     Comment: beer per day, on average     Drug use: Yes     Types: Marijuana     Comment: smoking prn       Family history:  Family History   Problem Relation Age of Onset     Hypertension Mother      Hypertension Father      Hypertension Maternal Grandmother      Hypertension Maternal Grandfather      Hypertension Paternal Grandmother      Hypertension Paternal Grandfather      Anesthesia Reaction No family hx of      Deep Vein Thrombosis (DVT) No family hx of        Allergies:   Allergies   Allergen Reactions     Other Food Allergy      PN: LW Other1: -lactose intolerant       Outpatient medications:     Current Outpatient Medications:      acetaminophen (TYLENOL) 500 MG tablet, Take 2 tablets (1,000 mg) by mouth every 6 hours, Disp: 100 tablet, Rfl: 0     amLODIPine (NORVASC) 10 MG tablet, Take 1 tablet (10 mg) by mouth daily (Patient taking differently: Take 10 mg by mouth every morning), Disp: 90 tablet, Rfl: 1     atorvastatin (LIPITOR) 20 MG tablet, Take 1 tablet (20 mg) by mouth daily (Patient taking differently: Take 20 mg by mouth  every morning), Disp: 90 tablet, Rfl: 2     Bioflavonoid Products (VITAMIN C) CHEW, Take by mouth every morning, Disp: , Rfl:      calcium carbonate-vitamin D (OSCAL) 250-3.125 MG-MCG TABS per tablet, Take 1 tablet by mouth every morning, Disp: , Rfl:      enoxaparin ANTICOAGULANT (LOVENOX) 40 MG/0.4ML syringe, Inject 0.4 mLs (40 mg) Subcutaneous daily for 30 days, Disp: 12 mL, Rfl: 0     gabapentin (NEURONTIN) 100 MG capsule, Take 1 capsule (100 mg) by mouth At Bedtime, Disp: 14 capsule, Rfl: 0     hydrochlorothiazide (HYDRODIURIL) 25 MG tablet, Take 1 tablet (25 mg) by mouth daily (Patient taking differently: Take 25 mg by mouth every morning), Disp: 90 tablet, Rfl: 1     hydrOXYzine (ATARAX) 25 MG tablet, Take 1 tablet (25 mg) by mouth every 8 hours as needed for anxiety, Disp: 5 tablet, Rfl: 0     methocarbamol (ROBAXIN) 500 MG tablet, Take 1 tablet (500 mg) by mouth 4 times daily, Disp: 50 tablet, Rfl: 0     Moringa Oleifera (MORINGA PO), Take by mouth every morning, Disp: , Rfl:      Omega-3 Fatty Acids (FISH OIL) 1200 MG capsule, Take 1,200 mg by mouth every morning, Disp: , Rfl:      oxyCODONE (ROXICODONE) 5 MG tablet, Take 1 tablet (5 mg) by mouth every 4 hours as needed for moderate pain (4-6), Disp: 20 tablet, Rfl: 0     TURMERIC PO, Take by mouth every morning, Disp: , Rfl:      UNABLE TO FIND, every morning MEDICATION NAME: Brian, Disp: , Rfl:      vitamin B complex with vitamin C (STRESS TAB) tablet, Take 1 tablet by mouth every morning, Disp: , Rfl:         Gerardo Ahmadi MD

## 2023-03-27 NOTE — PROGRESS NOTES
[facility] - Medical Oncology New Outpatient Consult Note  3/27/2023    Patient Identifiers     Name: Edin Robles  Preferred Address: [place]  Preferred Language: English  : 1955  Gender: male    Assessment and Plan     Mr. Edin Robles is a 67 year old male former smoker with prior history of HTN and HLD who presents with newly diagnosed dMMR stage I colon cancer.    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 Escobar.  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  This finding, along with patient's age and negative family cancer history indicate no further genetic testing is required.  Based on patient's cancer stage there is no indication for routine clinical or imaging surveillance.  Patient should continue follow-up with colorectal surgery as scheduled for surveillance colonoscopies.  Patient should also follow-up with primary care doctor for other routine surveillance including symptomatic prostate screening and smoking-history-based targeted screening.    Plan:  Stage I dMMR CRC  - no routine Med Onc or imaging follow-up indicated  - f/u with CRS as scheduled for surveillance colonoscopies  - f/u with PMD for symptomatic prostate screening and targeted smoking-based cancer screening (none indicated at this time)    The patient asked numerous excellent questions which I answered to the best of my abilities. At the completion of our consultation, the patient and accompanying caregivers had a strong understanding of the plan. They have our contact information for any further questions or concerns, and know to reach out for any urgent matters. Patient and family aware that they should call 911 for emergencies.       60 minutes spent on the date of the encounter doing chart review, review of test results, interpretation of tests, patient visit and documentation  "      Gerardo Ahmadi MD, PhD   of Medicine  Division of Hematology, Oncology and Transplantation  [facility]    -----------------------------------    Oncology Summary and HPI      Cancer Staging   Colon cancer (H)  Staging form: Colon and Rectum, AJCC 8th Edition  - Pathologic stage from 3/13/2023: Stage I (pT2, pN0, cM0) - Signed by Gerardo Ahmadi MD on 3/27/2023      Oncology History   Colon cancer (H)   3/8/2023 Initial Diagnosis    Colon cancer (H)     3/13/2023 -  Cancer Staged    Staging form: Colon and Rectum, AJCC 8th Edition  - Pathologic stage from 3/13/2023: Stage I (pT2, pN0, cM0)         Subjective/Interval Events     - quit smoking 2 years ago without medical aid. Had smoked since age of 20. Smoked a few cigs daily.   - diarrhea for a couple of days since surgery. Has since resolved.     Review of Systems: 14 point ROS negative other than the symptoms noted above in the HPI.    Physical Exam     Vital signs: /79   Pulse 75   Resp 20   Ht 1.815 m (5' 11.46\")   Wt 94.3 kg (208 lb)   SpO2 100%   BMI 28.64 kg/m      ECOG performance status:  0  Vascular access:  none    GENERAL: Well-nourished healthy-appearing man, sitting in chair, no acute distress.   HEENT: No icterus, no pallor. Moist mucous membranes.   LUNGS: Clear to ausculation bilaterally, normal work of breathing.   CARDIOVASCULAR: Regular rate and rhythm, no murmurs, gallops or rubs.   ABDOMEN: Soft, nontender and nondistended.  EXTREMITIES: No cyanosis, no clubbing, no edema.   NEUROLOGIC: No focal deficits, alert/ oriented  LYMPH NODE EXAM: No palpable adenopathy.    Objective Data     Lab data:  I have personally reviewed the lab data, notable for:    - WBC 15.3 and 9.7 (two values over three days)  - no other notables    Radiology data:  I have personally reviewed the radiology data, notable for:  01/16/2023  IMPRESSION: In this patient with history of mass of the ascending  colon:  1. No " appreciable colonic mass. No evidence of metastatic disease in  the chest, abdomen, or pelvis.  2. Dilation of the ascending aorta measuring up to 4.5 cm.  3. Other chronic and incidental findings as detailed in the body of  the report:    Pathology and other data:  I have personally reviewed and interpreted the pathology data, notable for:    03/08/2023 Surg Path  A. UMBILICAL HERNIA CONTENTS, RESECTION:  -Benign fibroadipose tissue compatible with umbilical hernia, negative for malignancy    B. TERMINAL ILEUM, APPENDIX, ASCENDING COLON, PROXIMAL TRANSVERSE COLON, RESECTION:  -Moderately differentiated invasive adenocarcinoma of the ascending colon  -Tumor invades into the superficial portion of the muscularis propria  -All surgical margins are free of neoplasia  -Twenty-six benign lymph nodes (0/26)  -No evidence of lymphovascular or perineural spread  -0.7 cm tubular adenoma  -Benign appendix, unremarkable terminal ileum    Performed on paraffin block A1:     Protein Marker    Result  MLH1                   Loss of nuclear expression  MSH2                   Intact nuclear expression  MSH6                   Intact nuclear expression  PMS2                   Loss of nuclear expression    MLH1 promoter methylation: POSITIVE     Medical/Surgical History     Past medical history:  Active Ambulatory Problems     Diagnosis Date Noted     Major depressive disorder, recurrent episode, moderate (H) 08/02/2022     Chronic thoracic back pain, unspecified back pain laterality 08/21/2022     Primary hypertension 08/21/2022     Hyperlipidemia, unspecified hyperlipidemia type 08/21/2022     Low TSH level 08/21/2022     Cocaine abuse in remission (H) 12/06/2022     Pancreatic cyst 12/18/2022     Colon cancer (H) 03/08/2023     Resolved Ambulatory Problems     Diagnosis Date Noted     No Resolved Ambulatory Problems     Past Medical History:   Diagnosis Date     Depressive disorder 1995     Hypertension        Past surgical  history:  Past Surgical History:   Procedure Laterality Date     COLONOSCOPY N/A 2023    Procedure: COLONOSCOPY, WITH POLYPECTOMY AND BIOPSY;  Surgeon: Liborio Tucker MD;  Location: [facility] OR     COLONOSCOPY       EYE SURGERY       HERNIORRHAPHY UMBILICAL N/A 3/8/2023    Procedure: Herniorrhaphy umbilical Repair;  Surgeon: Nir Escobar MD;  [facility]        Social history:   Social History     Tobacco Use     Smoking status: Former     Types: Cigarettes     Quit date: 2021     Years since quittin.6     Smokeless tobacco: Never   Vaping Use     Vaping Use: Never used   Substance Use Topics     Alcohol use: Yes     Comment: beer per day, on average     Drug use: Yes     Types: Marijuana     Comment: smoking prn       Family history:  Family History   Problem Relation Age of Onset     Hypertension Mother      Hypertension Father      Hypertension Maternal Grandmother      Hypertension Maternal Grandfather      Hypertension Paternal Grandmother      Hypertension Paternal Grandfather      Anesthesia Reaction No family hx of      Deep Vein Thrombosis (DVT) No family hx of        Allergies:   Allergies   Allergen Reactions     Other Food Allergy      PN: LW Other1: -lactose intolerant       Outpatient medications:     Current Outpatient Medications:      acetaminophen (TYLENOL) 500 MG tablet, Take 2 tablets (1,000 mg) by mouth every 6 hours, Disp: 100 tablet, Rfl: 0     amLODIPine (NORVASC) 10 MG tablet, Take 1 tablet (10 mg) by mouth daily (Patient taking differently: Take 10 mg by mouth every morning), Disp: 90 tablet, Rfl: 1     atorvastatin (LIPITOR) 20 MG tablet, Take 1 tablet (20 mg) by mouth daily (Patient taking differently: Take 20 mg by mouth every morning), Disp: 90 tablet, Rfl: 2     Bioflavonoid Products (VITAMIN C) CHEW, Take by mouth every morning, Disp: , Rfl:      calcium carbonate-vitamin D (OSCAL) 250-3.125 MG-MCG TABS per tablet, Take 1 tablet by mouth every morning, Disp: ,  Rfl:      enoxaparin ANTICOAGULANT (LOVENOX) 40 MG/0.4ML syringe, Inject 0.4 mLs (40 mg) Subcutaneous daily for 30 days, Disp: 12 mL, Rfl: 0     gabapentin (NEURONTIN) 100 MG capsule, Take 1 capsule (100 mg) by mouth At Bedtime, Disp: 14 capsule, Rfl: 0     hydrochlorothiazide (HYDRODIURIL) 25 MG tablet, Take 1 tablet (25 mg) by mouth daily (Patient taking differently: Take 25 mg by mouth every morning), Disp: 90 tablet, Rfl: 1     hydrOXYzine (ATARAX) 25 MG tablet, Take 1 tablet (25 mg) by mouth every 8 hours as needed for anxiety, Disp: 5 tablet, Rfl: 0     methocarbamol (ROBAXIN) 500 MG tablet, Take 1 tablet (500 mg) by mouth 4 times daily, Disp: 50 tablet, Rfl: 0     Moringa Oleifera (MORINGA PO), Take by mouth every morning, Disp: , Rfl:      Omega-3 Fatty Acids (FISH OIL) 1200 MG capsule, Take 1,200 mg by mouth every morning, Disp: , Rfl:      oxyCODONE (ROXICODONE) 5 MG tablet, Take 1 tablet (5 mg) by mouth every 4 hours as needed for moderate pain (4-6), Disp: 20 tablet, Rfl: 0     TURMERIC PO, Take by mouth every morning, Disp: , Rfl:      UNABLE TO FIND, every morning MEDICATION NAME: Brian, Disp: , Rfl:      vitamin B complex with vitamin C (STRESS TAB) tablet, Take 1 tablet by mouth every morning, Disp: , Rfl:

## 2023-03-27 NOTE — NURSING NOTE
"Oncology Rooming Note    March 27, 2023 4:06 PM   Edin Robles is a 67 year old male who presents for:    Chief Complaint   Patient presents with     Oncology Clinic Visit     Colon cancer     Initial Vitals: /79   Pulse 75   Resp 20   Ht 1.815 m (5' 11.46\")   Wt 94.3 kg (208 lb)   SpO2 100%   BMI 28.64 kg/m   Estimated body mass index is 28.64 kg/m  as calculated from the following:    Height as of this encounter: 1.815 m (5' 11.46\").    Weight as of this encounter: 94.3 kg (208 lb). Body surface area is 2.18 meters squared.  Moderate Pain (5) Comment: Data Unavailable   No LMP for male patient.  Allergies reviewed: Yes  Medications reviewed: Yes    Medications: Medication refills not needed today.  Pharmacy name entered into [employer]:    [id number] - [place], [place] - [address] [facility] - [place], [place] - [address] [place] [address]    Clinical concerns: none       Tali Arenas CMA            "

## 2023-03-29 ENCOUNTER — OFFICE VISIT (OUTPATIENT)
Dept: SURGERY | Facility: CLINIC | Age: 68
End: 2023-03-29
Payer: COMMERCIAL

## 2023-03-29 VITALS
OXYGEN SATURATION: 98 % | SYSTOLIC BLOOD PRESSURE: 109 MMHG | HEIGHT: 72 IN | HEART RATE: 65 BPM | DIASTOLIC BLOOD PRESSURE: 76 MMHG | WEIGHT: 210 LBS | BODY MASS INDEX: 28.44 KG/M2

## 2023-03-29 DIAGNOSIS — Z90.49 S/P PARTIAL COLECTOMY: ICD-10-CM

## 2023-03-29 DIAGNOSIS — Z09 FOLLOW-UP EXAMINATION AFTER COLORECTAL SURGERY: Primary | ICD-10-CM

## 2023-03-29 DIAGNOSIS — C18.2 MALIGNANT NEOPLASM OF ASCENDING COLON (H): ICD-10-CM

## 2023-03-29 PROCEDURE — 99024 POSTOP FOLLOW-UP VISIT: CPT

## 2023-03-29 ASSESSMENT — PAIN SCALES - GENERAL: PAINLEVEL: NO PAIN (0)

## 2023-03-29 NOTE — NURSING NOTE
Chief Complaint   Patient presents with     Surgical Followup     DOS 03/08/23       Vitals:    03/29/23 1028   BP: 109/76   BP Location: Left arm   Patient Position: Sitting   Cuff Size: Adult Regular   Pulse: 65   SpO2: 98%   Weight: 210 lb   Height: 6'       Body mass index is 28.48 kg/m .     Juvencio Edmond, EMT- P

## 2023-03-29 NOTE — LETTER
3/29/2023       RE: Edin Robles  [address] [facility] [address]     Dear Colleague,    Thank you for referring your patient, Edin Robles, to the [facility] COLON AND RECTAL SURGERY CLINIC [place] at [facility]. Please see a copy of my visit note below.    Colon and Rectal Surgery Postoperative Clinic Note    RE: Edin Robles  : 1955  PAT: 3/29/2023    Edin Robles is a very pleasant 67 year old male with a history of likely malignant ascending colon polyp not amenable to endoscopic resection now status post laparoscopic right colectomy with umbilical hernia repair with Dr. Escobar on 3/8/23.    Final Diagnosis   A. UMBILICAL HERNIA CONTENTS, RESECTION:  -Benign fibroadipose tissue compatible with umbilical hernia, negative for malignancy    B. TERMINAL ILEUM, APPENDIX, ASCENDING COLON, PROXIMAL TRANSVERSE COLON, RESECTION:  -Moderately differentiated invasive adenocarcinoma of the ascending colon  -Tumor invades into the superficial portion of the muscularis propria  -All surgical margins are free of neoplasia  -Twenty-six benign lymph nodes (0/26)  -No evidence of lymphovascular or perineural spread  -0.7 cm tubular adenoma  -Benign appendix, unremarkable terminal ileum   Final Interpretation:  LOSS OF NUCLEAR EXPRESSION OF MLH1 and PMS2:  -Testing for MLH1 promoter methylation has been ordered; report to follow  RESULTS:  Performed on paraffin block A1:  Protein Marker    Result  MLH1                   Loss of nuclear expression  MSH2                   Intact nuclear expression  MSH6                   Intact nuclear expression  PMS2                   Loss of nuclear expression  INTERPRETATION    This patient's sample is Positive for MLH1 promoter methylation.      Interval history: Doing well. Pain is improving and only taking oxycodone at night to help him sleep. Bowel movements are a little more constipated than his  baseline. Regular diet. Has met with Dr. Ahmadi and does not need routine clinical or imaging surveillance, only surveillance colonoscopies. Also reports chronic back pain, wondering if something can be done about it.    Physical Examination:   /76 (BP Location: Left arm, Patient Position: Sitting, Cuff Size: Adult Regular)   Pulse 65   Ht 6'   Wt 210 lb   SpO2 98%   BMI 28.48 kg/m    General: alert, oriented, in no acute distress, sitting comfortably  HEENT: moist mucous membranes  Abdomen: Laparoscopic incisions and supraumbilical incisions well approximated without erythema.    Assessment/Plan:  67 year old male with a history of likely malignant ascending colon polyp not amenable to endoscopic resection now status post laparoscopic right colectomy with umbilical hernia repair with Dr. Escobar on 3/8/23. Pathology returned Stage I dMMR adenocarcinoma of the ascending colon. He has already met with Dr. Ahmadi and does not need routine clinical or imaging surveillance. From a postoperative perspective, he is doing great. Recommended he follow up with PCP or ortho for chronic back pain. Continue regular diet. Avoid lifting >10 lb for another month. He is scheduled to follow up with Dr. Escobar on 5/4/23. Contact us in the meantime with questions. Patient's questions were answered to his stated satisfaction and he is in agreement with this plan.     Medical history:  Past Medical History:   Diagnosis Date     Colon cancer (H) 3/8/2023     Depressive disorder 1995     Hypertension        Surgical history:  Past Surgical History:   Procedure Laterality Date     COLONOSCOPY N/A 01/06/2023    Procedure: COLONOSCOPY, WITH POLYPECTOMY AND BIOPSY;  Surgeon: Liborio Tucker MD;  Location: [facility] OR     COLONOSCOPY       EYE SURGERY       HERNIORRHAPHY UMBILICAL N/A 3/8/2023    Procedure: Herniorrhaphy umbilical Repair;  Surgeon: Nir Escobar MD;  Location: [facility] OR       Problem list:    Patient Active  Problem List    Diagnosis Date Noted     Colon cancer (H) 03/08/2023     Priority: Medium     Pancreatic cyst 12/18/2022     Priority: Medium     Dec 2022 - follow up in 6-12 months recommended.         Cocaine abuse in remission (H) 12/06/2022     Priority: Medium     Chronic thoracic back pain, unspecified back pain laterality 08/21/2022     Priority: Medium     Primary hypertension 08/21/2022     Priority: Medium     Hyperlipidemia, unspecified hyperlipidemia type 08/21/2022     Priority: Medium     Low TSH level 08/21/2022     Priority: Medium     Major depressive disorder, recurrent episode, moderate (H) 08/02/2022     Priority: Medium       Medications:  Current Outpatient Medications   Medication Sig Dispense Refill     acetaminophen (TYLENOL) 500 MG tablet Take 2 tablets (1,000 mg) by mouth every 6 hours 100 tablet 0     amLODIPine (NORVASC) 10 MG tablet Take 1 tablet (10 mg) by mouth daily (Patient taking differently: Take 10 mg by mouth every morning) 90 tablet 1     atorvastatin (LIPITOR) 20 MG tablet Take 1 tablet (20 mg) by mouth daily (Patient taking differently: Take 20 mg by mouth every morning) 90 tablet 2     Bioflavonoid Products (VITAMIN C) CHEW Take by mouth every morning       calcium carbonate-vitamin D (OSCAL) 250-3.125 MG-MCG TABS per tablet Take 1 tablet by mouth every morning       enoxaparin ANTICOAGULANT (LOVENOX) 40 MG/0.4ML syringe Inject 0.4 mLs (40 mg) Subcutaneous daily for 30 days 12 mL 0     gabapentin (NEURONTIN) 100 MG capsule Take 1 capsule (100 mg) by mouth At Bedtime 14 capsule 0     hydrochlorothiazide (HYDRODIURIL) 25 MG tablet Take 1 tablet (25 mg) by mouth daily (Patient taking differently: Take 25 mg by mouth every morning) 90 tablet 1     hydrOXYzine (ATARAX) 25 MG tablet Take 1 tablet (25 mg) by mouth every 8 hours as needed for anxiety 5 tablet 0     methocarbamol (ROBAXIN) 500 MG tablet Take 1 tablet (500 mg) by mouth 4 times daily 50 tablet 0     Moringa Oleifera  (MORINGA PO) Take by mouth every morning       Omega-3 Fatty Acids (FISH OIL) 1200 MG capsule Take 1,200 mg by mouth every morning       oxyCODONE (ROXICODONE) 5 MG tablet Take 1 tablet (5 mg) by mouth every 4 hours as needed for moderate pain (4-6) 20 tablet 0     TURMERIC PO Take by mouth every morning       UNABLE TO FIND every morning MEDICATION NAME: Brian       vitamin B complex with vitamin C (STRESS TAB) tablet Take 1 tablet by mouth every morning         Allergies:  Allergies   Allergen Reactions     Other Food Allergy      PN: LW Other1: -lactose intolerant       Family history:  Family History   Problem Relation Age of Onset     Hypertension Mother      Hypertension Father      Hypertension Maternal Grandmother      Hypertension Maternal Grandfather      Hypertension Paternal Grandmother      Hypertension Paternal Grandfather      Anesthesia Reaction No family hx of      Deep Vein Thrombosis (DVT) No family hx of        Social history:  Social History     Tobacco Use     Smoking status: Former     Types: Cigarettes     Quit date: 2021     Years since quittin.6     Smokeless tobacco: Never   Substance Use Topics     Alcohol use: Yes     Comment: beer per day, on average     Marital status: single.    Nursing Notes:   Juvencio Edmond, EMT  3/29/2023 10:31 AM  Signed  Chief Complaint   Patient presents with     Surgical Followup     DOS 23       Vitals:    23 1028   BP: 109/76   BP Location: Left arm   Patient Position: Sitting   Cuff Size: Adult Regular   Pulse: 65   SpO2: 98%   Weight: 210 lb   Height: 6'       Body mass index is 28.48 kg/m .     Juvencio Edmond, EMT- P    15 minutes spent on the date of the encounter doing chart review, history and exam, documentation and further activities as noted above.   This is a postop visit.    Kari Cordova PA-C  Colon and Rectal Surgery  [facility]

## 2023-03-29 NOTE — PROGRESS NOTES
Colon and Rectal Surgery Postoperative Clinic Note    RE: Edin Robles  : 1955  PAT: 3/29/2023    Edin Robles is a very pleasant 67 year old male with a history of likely malignant ascending colon polyp not amenable to endoscopic resection now status post laparoscopic right colectomy with umbilical hernia repair with Dr. Escobar on 3/8/23.    Final Diagnosis   A. UMBILICAL HERNIA CONTENTS, RESECTION:  -Benign fibroadipose tissue compatible with umbilical hernia, negative for malignancy    B. TERMINAL ILEUM, APPENDIX, ASCENDING COLON, PROXIMAL TRANSVERSE COLON, RESECTION:  -Moderately differentiated invasive adenocarcinoma of the ascending colon  -Tumor invades into the superficial portion of the muscularis propria  -All surgical margins are free of neoplasia  -Twenty-six benign lymph nodes (0/26)  -No evidence of lymphovascular or perineural spread  -0.7 cm tubular adenoma  -Benign appendix, unremarkable terminal ileum   Final Interpretation:  LOSS OF NUCLEAR EXPRESSION OF MLH1 and PMS2:  -Testing for MLH1 promoter methylation has been ordered; report to follow  RESULTS:  Performed on paraffin block A1:  Protein Marker    Result  MLH1                   Loss of nuclear expression  MSH2                   Intact nuclear expression  MSH6                   Intact nuclear expression  PMS2                   Loss of nuclear expression  INTERPRETATION    This patient's sample is Positive for MLH1 promoter methylation.      Interval history: Doing well. Pain is improving and only taking oxycodone at night to help him sleep. Bowel movements are a little more constipated than his baseline. Regular diet. Has met with Dr. Ahmadi and does not need routine clinical or imaging surveillance, only surveillance colonoscopies. Also reports chronic back pain, wondering if something can be done about it.    Physical Examination:   /76 (BP Location: Left arm, Patient Position: Sitting, Cuff Size: Adult Regular)    Pulse 65   Ht 6'   Wt 210 lb   SpO2 98%   BMI 28.48 kg/m    General: alert, oriented, in no acute distress, sitting comfortably  HEENT: moist mucous membranes  Abdomen: Laparoscopic incisions and supraumbilical incisions well approximated without erythema.    Assessment/Plan:  67 year old male with a history of likely malignant ascending colon polyp not amenable to endoscopic resection now status post laparoscopic right colectomy with umbilical hernia repair with Dr. Escobar on 3/8/23. Pathology returned Stage I dMMR adenocarcinoma of the ascending colon. He has already met with Dr. Ahmadi and does not need routine clinical or imaging surveillance. From a postoperative perspective, he is doing great. Recommended he follow up with PCP or ortho for chronic back pain. Continue regular diet. Avoid lifting >10 lb for another month. He is scheduled to follow up with Dr. Escobar on 5/4/23. Contact us in the meantime with questions. Patient's questions were answered to his stated satisfaction and he is in agreement with this plan.     Medical history:  Past Medical History:   Diagnosis Date     Colon cancer (H) 3/8/2023     Depressive disorder 1995     Hypertension        Surgical history:  Past Surgical History:   Procedure Laterality Date     COLONOSCOPY N/A 01/06/2023    Procedure: COLONOSCOPY, WITH POLYPECTOMY AND BIOPSY;  Surgeon: Liborio Tucker MD;  Location: [facility] OR     COLONOSCOPY       EYE SURGERY       HERNIORRHAPHY UMBILICAL N/A 3/8/2023    Procedure: Herniorrhaphy umbilical Repair;  Surgeon: Nir Escobar MD;  Location: [facility] OR       Problem list:    Patient Active Problem List    Diagnosis Date Noted     Colon cancer (H) 03/08/2023     Priority: Medium     Pancreatic cyst 12/18/2022     Priority: Medium     Dec 2022 - follow up in 6-12 months recommended.         Cocaine abuse in remission (H) 12/06/2022     Priority: Medium     Chronic thoracic back pain, unspecified back pain laterality  08/21/2022     Priority: Medium     Primary hypertension 08/21/2022     Priority: Medium     Hyperlipidemia, unspecified hyperlipidemia type 08/21/2022     Priority: Medium     Low TSH level 08/21/2022     Priority: Medium     Major depressive disorder, recurrent episode, moderate (H) 08/02/2022     Priority: Medium       Medications:  Current Outpatient Medications   Medication Sig Dispense Refill     acetaminophen (TYLENOL) 500 MG tablet Take 2 tablets (1,000 mg) by mouth every 6 hours 100 tablet 0     amLODIPine (NORVASC) 10 MG tablet Take 1 tablet (10 mg) by mouth daily (Patient taking differently: Take 10 mg by mouth every morning) 90 tablet 1     atorvastatin (LIPITOR) 20 MG tablet Take 1 tablet (20 mg) by mouth daily (Patient taking differently: Take 20 mg by mouth every morning) 90 tablet 2     Bioflavonoid Products (VITAMIN C) CHEW Take by mouth every morning       calcium carbonate-vitamin D (OSCAL) 250-3.125 MG-MCG TABS per tablet Take 1 tablet by mouth every morning       enoxaparin ANTICOAGULANT (LOVENOX) 40 MG/0.4ML syringe Inject 0.4 mLs (40 mg) Subcutaneous daily for 30 days 12 mL 0     gabapentin (NEURONTIN) 100 MG capsule Take 1 capsule (100 mg) by mouth At Bedtime 14 capsule 0     hydrochlorothiazide (HYDRODIURIL) 25 MG tablet Take 1 tablet (25 mg) by mouth daily (Patient taking differently: Take 25 mg by mouth every morning) 90 tablet 1     hydrOXYzine (ATARAX) 25 MG tablet Take 1 tablet (25 mg) by mouth every 8 hours as needed for anxiety 5 tablet 0     methocarbamol (ROBAXIN) 500 MG tablet Take 1 tablet (500 mg) by mouth 4 times daily 50 tablet 0     Moringa Oleifera (MORINGA PO) Take by mouth every morning       Omega-3 Fatty Acids (FISH OIL) 1200 MG capsule Take 1,200 mg by mouth every morning       oxyCODONE (ROXICODONE) 5 MG tablet Take 1 tablet (5 mg) by mouth every 4 hours as needed for moderate pain (4-6) 20 tablet 0     TURMERIC PO Take by mouth every morning       UNABLE TO FIND every  morning MEDICATION NAME: Brian       vitamin B complex with vitamin C (STRESS TAB) tablet Take 1 tablet by mouth every morning         Allergies:  Allergies   Allergen Reactions     Other Food Allergy      PN: LW Other1: -lactose intolerant       Family history:  Family History   Problem Relation Age of Onset     Hypertension Mother      Hypertension Father      Hypertension Maternal Grandmother      Hypertension Maternal Grandfather      Hypertension Paternal Grandmother      Hypertension Paternal Grandfather      Anesthesia Reaction No family hx of      Deep Vein Thrombosis (DVT) No family hx of        Social history:  Social History     Tobacco Use     Smoking status: Former     Types: Cigarettes     Quit date: 2021     Years since quittin.6     Smokeless tobacco: Never   Substance Use Topics     Alcohol use: Yes     Comment: beer per day, on average     Marital status: single.    Nursing Notes:   Juvencio Edmond, EMT  3/29/2023 10:31 AM  Signed  Chief Complaint   Patient presents with     Surgical Followup     DOS 23       Vitals:    23 1028   BP: 109/76   BP Location: Left arm   Patient Position: Sitting   Cuff Size: Adult Regular   Pulse: 65   SpO2: 98%   Weight: 210 lb   Height: 6'       Body mass index is 28.48 kg/m .     Juvencio Edmond, EMT- P         15 minutes spent on the date of the encounter doing chart review, history and exam, documentation and further activities as noted above.   This is a postop visit.      Kari Cordova PA-C  Colon and Rectal Surgery  [facility]

## 2023-03-31 ENCOUNTER — DOCUMENTATION ONLY (OUTPATIENT)
Dept: OTHER | Facility: CLINIC | Age: 68
End: 2023-03-31
Payer: COMMERCIAL

## 2023-04-11 NOTE — PROGRESS NOTES
Colon and Rectal Surgery Postoperative Clinic Note     Referring provider:  Nir Escobar MD  [address] [place] [id number]     RE: Edin Robles  : 1955  PAT: 2023    Edin Robles is a very pleasant 67 year old male with a history of likely malignant ascending colon polyp not amenable to endoscopic resection now status post laparoscopic right colectomy with umbilical hernia repair on 3/8/23.    Final Diagnosis   A. UMBILICAL HERNIA CONTENTS, RESECTION:  -Benign fibroadipose tissue compatible with umbilical hernia, negative for malignancy    B. TERMINAL ILEUM, APPENDIX, ASCENDING COLON, PROXIMAL TRANSVERSE COLON, RESECTION:  -Moderately differentiated invasive adenocarcinoma of the ascending colon  -Tumor invades into the superficial portion of the muscularis propria  -All surgical margins are free of neoplasia  -Twenty-six benign lymph nodes (0/26)  -No evidence of lymphovascular or perineural spread  -0.7 cm tubular adenoma  -Benign appendix, unremarkable terminal ileum     Interval history: Doing well, no concerns at this time.    Assessment/Plan:  67 year old male with a significant past medical history of malignancy now 8 week(s) status post laparoscopic right colectomy with umbilical hernia repair.    -Colonoscopy by next winter for surveillance.  -Surveillance with medical oncology as well, appreciate cares.    PLEASE SEE NOTE BELOW FOR PHYSICAL EXAMINATION, REVIEW OF SYSTEMS, AND OTHER HISTORY.    Nir Escobar MD  Division of Colon and Rectal Surgery   [facility]  [phone]    -------------------------------------------------------------------------------------------------------------------    Medical history:  Past Medical History:   Diagnosis Date     Colon cancer (H) 3/8/2023     Depressive disorder      Hypertension        Surgical history:  Past Surgical History:   Procedure Laterality Date     COLONOSCOPY N/A 2023     Procedure: COLONOSCOPY, WITH POLYPECTOMY AND BIOPSY;  Surgeon: Liborio Tucker MD;  Location: [facility] OR     COLONOSCOPY       EYE SURGERY       HERNIORRHAPHY UMBILICAL N/A 3/8/2023    Procedure: Herniorrhaphy umbilical Repair;  Surgeon: Nir Escobar MD;  Location: [facility] OR       Problem list:  Patient Active Problem List    Diagnosis Date Noted     Colon cancer (H) 03/08/2023     Priority: Medium     Pancreatic cyst 12/18/2022     Priority: Medium     Dec 2022 - follow up in 6-12 months recommended.         Cocaine abuse in remission (H) 12/06/2022     Priority: Medium     Chronic thoracic back pain, unspecified back pain laterality 08/21/2022     Priority: Medium     Primary hypertension 08/21/2022     Priority: Medium     Hyperlipidemia, unspecified hyperlipidemia type 08/21/2022     Priority: Medium     Low TSH level 08/21/2022     Priority: Medium     Major depressive disorder, recurrent episode, moderate (H) 08/02/2022     Priority: Medium       Medications:  Current Outpatient Medications   Medication Sig Dispense Refill     acetaminophen (TYLENOL) 500 MG tablet Take 2 tablets (1,000 mg) by mouth every 6 hours 100 tablet 0     amLODIPine (NORVASC) 10 MG tablet Take 1 tablet (10 mg) by mouth daily (Patient taking differently: Take 10 mg by mouth every morning) 90 tablet 1     atorvastatin (LIPITOR) 20 MG tablet Take 1 tablet (20 mg) by mouth daily (Patient taking differently: Take 20 mg by mouth every morning) 90 tablet 2     Bioflavonoid Products (VITAMIN C) CHEW Take by mouth every morning       calcium carbonate-vitamin D (OSCAL) 250-3.125 MG-MCG TABS per tablet Take 1 tablet by mouth every morning       enoxaparin ANTICOAGULANT (LOVENOX) 40 MG/0.4ML syringe Inject 0.4 mLs (40 mg) Subcutaneous daily for 30 days 12 mL 0     gabapentin (NEURONTIN) 100 MG capsule Take 1 capsule (100 mg) by mouth At Bedtime 14 capsule 0     hydrochlorothiazide (HYDRODIURIL) 25 MG tablet Take 1 tablet (25 mg) by mouth daily  (Patient taking differently: Take 25 mg by mouth every morning) 90 tablet 1     hydrOXYzine (ATARAX) 25 MG tablet Take 1 tablet (25 mg) by mouth every 8 hours as needed for anxiety 5 tablet 0     methocarbamol (ROBAXIN) 500 MG tablet Take 1 tablet (500 mg) by mouth 4 times daily 50 tablet 0     Moringa Oleifera (MORINGA PO) Take by mouth every morning       Omega-3 Fatty Acids (FISH OIL) 1200 MG capsule Take 1,200 mg by mouth every morning       oxyCODONE (ROXICODONE) 5 MG tablet Take 1 tablet (5 mg) by mouth every 4 hours as needed for moderate pain (4-6) 20 tablet 0     TURMERIC PO Take by mouth every morning       UNABLE TO FIND every morning MEDICATION NAME: Neem       vitamin B complex with vitamin C (STRESS TAB) tablet Take 1 tablet by mouth every morning         Allergies:  Allergies   Allergen Reactions     Other Food Allergy      PN: LW Other1: -lactose intolerant       Family history:  Family History   Problem Relation Age of Onset     Hypertension Mother      Hypertension Father      Hypertension Maternal Grandmother      Hypertension Maternal Grandfather      Hypertension Paternal Grandmother      Hypertension Paternal Grandfather      Anesthesia Reaction No family hx of      Deep Vein Thrombosis (DVT) No family hx of        Social history:  Social History     Socioeconomic History     Marital status: Single     Spouse name: Not on file     Number of children: Not on file     Years of education: Not on file     Highest education level: Not on file   Occupational History     Not on file   Tobacco Use     Smoking status: Former     Types: Cigarettes     Quit date: 2021     Years since quittin.6     Smokeless tobacco: Never   Vaping Use     Vaping status: Never Used   Substance and Sexual Activity     Alcohol use: Yes     Comment: beer per day, on average     Drug use: Yes     Types: Marijuana     Comment: smoking prn     Sexual activity: Not Currently     Partners: Female   Other Topics Concern      Parent/sibling w/ CABG, MI or angioplasty before 65F 55M? No   Social History Narrative     Not on file     Social Determinants of Health     Financial Resource Strain: Not on file   Food Insecurity: Not on file   Transportation Needs: Not on file   Physical Activity: Not on file   Stress: Not on file   Social Connections: Not on file   Intimate Partner Violence: Not At Risk (3/27/2023)    Humiliation, Afraid, Rape, and Kick questionnaire      Fear of Current or Ex-Partner: No      Emotionally Abused: No      Physically Abused: No      Sexually Abused: No   Housing Stability: Not on file       Physical Examination:  /85 (BP Location: Left arm, Patient Position: Sitting, Cuff Size: Adult Regular)   Pulse 75   Ht 1.829 m (6')   Wt 97.5 kg (215 lb)   SpO2 98%   BMI 29.16 kg/m    General: NAD  Abdomen: soft, NT, ND, incisions well healed.  Extremities: wwp  Perianal external examination:  deferred

## 2023-05-03 ENCOUNTER — TELEPHONE (OUTPATIENT)
Dept: SURGERY | Facility: CLINIC | Age: 68
End: 2023-05-03
Payer: COMMERCIAL

## 2023-05-03 ENCOUNTER — MYC MEDICAL ADVICE (OUTPATIENT)
Dept: SURGERY | Facility: CLINIC | Age: 68
End: 2023-05-03
Payer: COMMERCIAL

## 2023-05-04 ENCOUNTER — OFFICE VISIT (OUTPATIENT)
Dept: SURGERY | Facility: CLINIC | Age: 68
End: 2023-05-04
Payer: COMMERCIAL

## 2023-05-04 VITALS
WEIGHT: 215 LBS | DIASTOLIC BLOOD PRESSURE: 85 MMHG | SYSTOLIC BLOOD PRESSURE: 119 MMHG | BODY MASS INDEX: 29.12 KG/M2 | HEIGHT: 72 IN | HEART RATE: 75 BPM | OXYGEN SATURATION: 98 %

## 2023-05-04 DIAGNOSIS — Z90.49 S/P PARTIAL COLECTOMY: Primary | ICD-10-CM

## 2023-05-04 DIAGNOSIS — C18.2 MALIGNANT NEOPLASM OF ASCENDING COLON (H): ICD-10-CM

## 2023-05-04 PROCEDURE — 99024 POSTOP FOLLOW-UP VISIT: CPT | Performed by: SURGERY

## 2023-05-04 ASSESSMENT — PAIN SCALES - GENERAL: PAINLEVEL: NO PAIN (0)

## 2023-05-04 NOTE — LETTER
2023       RE: Edin Robles  [address] [facility] [address]     Dear Colleague,    Thank you for referring your patient, Edin Robles, to the [facility] COLON AND RECTAL SURGERY CLINIC [place] at [facility]. Please see a copy of my visit note below.    Colon and Rectal Surgery Postoperative Clinic Note     Referring provider:  Nir Escobar MD  [address] [place] [address]     RE: Edin Robles  : 1955  PAT: 2023    Edin Robles is a very pleasant 67 year old male with a history of likely malignant ascending colon polyp not amenable to endoscopic resection now status post laparoscopic right colectomy with umbilical hernia repair on 3/8/23.    Final Diagnosis   A. UMBILICAL HERNIA CONTENTS, RESECTION:  -Benign fibroadipose tissue compatible with umbilical hernia, negative for malignancy    B. TERMINAL ILEUM, APPENDIX, ASCENDING COLON, PROXIMAL TRANSVERSE COLON, RESECTION:  -Moderately differentiated invasive adenocarcinoma of the ascending colon  -Tumor invades into the superficial portion of the muscularis propria  -All surgical margins are free of neoplasia  -Twenty-six benign lymph nodes (0/26)  -No evidence of lymphovascular or perineural spread  -0.7 cm tubular adenoma  -Benign appendix, unremarkable terminal ileum     Interval history: Doing well, no concerns at this time.    Assessment/Plan:  67 year old male with a significant past medical history of malignancy now 8 week(s) status post laparoscopic right colectomy with umbilical hernia repair.    -Colonoscopy by next winter for surveillance.  -Surveillance with medical oncology as well, appreciate cares.    PLEASE SEE NOTE BELOW FOR PHYSICAL EXAMINATION, REVIEW OF SYSTEMS, AND OTHER HISTORY.    Nir Escobar MD  Division of Colon and Rectal Surgery   [facility]  [facility]  [id number]    -------------------------------------------------------------------------------------------------------------------    Medical history:  Past Medical History:   Diagnosis Date    Colon cancer (H) 3/8/2023    Depressive disorder 1995    Hypertension        Surgical history:  Past Surgical History:   Procedure Laterality Date    COLONOSCOPY N/A 01/06/2023    Procedure: COLONOSCOPY, WITH POLYPECTOMY AND BIOPSY;  Surgeon: Liborio Tucker MD;  Location: [facility] OR    COLONOSCOPY      EYE SURGERY      HERNIORRHAPHY UMBILICAL N/A 3/8/2023    Procedure: Herniorrhaphy umbilical Repair;  Surgeon: Nir Escobar MD;  Location: [facility] OR       Problem list:  Patient Active Problem List    Diagnosis Date Noted    Colon cancer (H) 03/08/2023     Priority: Medium    Pancreatic cyst 12/18/2022     Priority: Medium     Dec 2022 - follow up in 6-12 months recommended.        Cocaine abuse in remission (H) 12/06/2022     Priority: Medium    Chronic thoracic back pain, unspecified back pain laterality 08/21/2022     Priority: Medium    Primary hypertension 08/21/2022     Priority: Medium    Hyperlipidemia, unspecified hyperlipidemia type 08/21/2022     Priority: Medium    Low TSH level 08/21/2022     Priority: Medium    Major depressive disorder, recurrent episode, moderate (H) 08/02/2022     Priority: Medium       Medications:  Current Outpatient Medications   Medication Sig Dispense Refill    acetaminophen (TYLENOL) 500 MG tablet Take 2 tablets (1,000 mg) by mouth every 6 hours 100 tablet 0    amLODIPine (NORVASC) 10 MG tablet Take 1 tablet (10 mg) by mouth daily (Patient taking differently: Take 10 mg by mouth every morning) 90 tablet 1    atorvastatin (LIPITOR) 20 MG tablet Take 1 tablet (20 mg) by mouth daily (Patient taking differently: Take 20 mg by mouth every morning) 90 tablet 2    Bioflavonoid Products (VITAMIN C) CHEW Take by mouth every morning      calcium carbonate-vitamin D (OSCAL) 250-3.125 MG-MCG TABS  per tablet Take 1 tablet by mouth every morning      enoxaparin ANTICOAGULANT (LOVENOX) 40 MG/0.4ML syringe Inject 0.4 mLs (40 mg) Subcutaneous daily for 30 days 12 mL 0    gabapentin (NEURONTIN) 100 MG capsule Take 1 capsule (100 mg) by mouth At Bedtime 14 capsule 0    hydrochlorothiazide (HYDRODIURIL) 25 MG tablet Take 1 tablet (25 mg) by mouth daily (Patient taking differently: Take 25 mg by mouth every morning) 90 tablet 1    hydrOXYzine (ATARAX) 25 MG tablet Take 1 tablet (25 mg) by mouth every 8 hours as needed for anxiety 5 tablet 0    methocarbamol (ROBAXIN) 500 MG tablet Take 1 tablet (500 mg) by mouth 4 times daily 50 tablet 0    Moringa Oleifera (MORINGA PO) Take by mouth every morning      Omega-3 Fatty Acids (FISH OIL) 1200 MG capsule Take 1,200 mg by mouth every morning      oxyCODONE (ROXICODONE) 5 MG tablet Take 1 tablet (5 mg) by mouth every 4 hours as needed for moderate pain (4-6) 20 tablet 0    TURMERIC PO Take by mouth every morning      UNABLE TO FIND every morning MEDICATION NAME: Neem      vitamin B complex with vitamin C (STRESS TAB) tablet Take 1 tablet by mouth every morning         Allergies:  Allergies   Allergen Reactions    Other Food Allergy      PN: LW Other1: -lactose intolerant       Family history:  Family History   Problem Relation Age of Onset    Hypertension Mother     Hypertension Father     Hypertension Maternal Grandmother     Hypertension Maternal Grandfather     Hypertension Paternal Grandmother     Hypertension Paternal Grandfather     Anesthesia Reaction No family hx of     Deep Vein Thrombosis (DVT) No family hx of        Social history:  Social History     Socioeconomic History    Marital status: Single     Spouse name: Not on file    Number of children: Not on file    Years of education: Not on file    Highest education level: Not on file   Occupational History    Not on file   Tobacco Use    Smoking status: Former     Types: Cigarettes     Quit date: 8/1/2021      Years since quittin.6    Smokeless tobacco: Never   Vaping Use    Vaping status: Never Used   Substance and Sexual Activity    Alcohol use: Yes     Comment: beer per day, on average    Drug use: Yes     Types: Marijuana     Comment: smoking prn    Sexual activity: Not Currently     Partners: Female   Other Topics Concern    Parent/sibling w/ CABG, MI or angioplasty before 65F 55M? No   Social History Narrative    Not on file     Social Determinants of Health     Financial Resource Strain: Not on file   Food Insecurity: Not on file   Transportation Needs: Not on file   Physical Activity: Not on file   Stress: Not on file   Social Connections: Not on file   Intimate Partner Violence: Not At Risk (3/27/2023)    Humiliation, Afraid, Rape, and Kick questionnaire     Fear of Current or Ex-Partner: No     Emotionally Abused: No     Physically Abused: No     Sexually Abused: No   Housing Stability: Not on file       Physical Examination:  /85 (BP Location: Left arm, Patient Position: Sitting, Cuff Size: Adult Regular)   Pulse 75   Ht 1.829 m (6')   Wt 97.5 kg (215 lb)   SpO2 98%   BMI 29.16 kg/m    General: NAD  Abdomen: soft, NT, ND, incisions well healed.  Extremities: wwp  Perianal external examination:  deferred      Again, thank you for allowing me to participate in the care of your patient.      Sincerely,    Nir Escobar MD, MD

## 2023-05-04 NOTE — NURSING NOTE
Chief Complaint   Patient presents with     Post-op Visit       Vitals:    05/04/23 0929   BP: 119/85   BP Location: Left arm   Patient Position: Sitting   Cuff Size: Adult Regular   Pulse: 75   SpO2: 98%   Weight: 215 lb   Height: 6'       Body mass index is 29.16 kg/m .    Karen Fletcher CMA

## 2023-09-02 DIAGNOSIS — E78.5 HYPERLIPIDEMIA, UNSPECIFIED HYPERLIPIDEMIA TYPE: ICD-10-CM

## 2023-09-05 RX ORDER — ATORVASTATIN CALCIUM 20 MG/1
20 TABLET, FILM COATED ORAL EVERY MORNING
Qty: 30 TABLET | Refills: 0 | Status: SHIPPED | OUTPATIENT
Start: 2023-09-05 | End: 2023-10-02

## 2023-09-27 NOTE — ED PROVIDER NOTES
ED Provider Note  [facility]      History     Chief Complaint   Patient presents with     Chest Pain     Right arm pain for over a month      HPI  Edin Robles is a 65 year old male with a history of hyperlipidemia and hypertension who presents with more than 1 month history of neck pain radiating into his chest and right arm.  He notes a sharp pain that is worse with coughing and at times deep breathing.  He states that the pain radiates into his right upper chest and right dorsal upper arm.  He has tried Tylenol and ibuprofen without significant relief.  He states that he had seen his physician several days previously to the onset of the pain at which time he was put back on medication for blood pressure and cholesterol.  He thought that the cholesterol medication may be causing or contributing to his symptoms.  He denies other ongoing symptoms such as nausea, vomiting, diarrhea, fever, chills, sweats.    Past Medical History  Past Medical History:   Diagnosis Date     Hypertension      History reviewed. No pertinent surgical history.       aspirin 81 MG EC tablet       atorvastatin (LIPITOR) 20 MG tablet       ibuprofen (ADVIL/MOTRIN) 100 MG tablet       methylPREDNISolone (MEDROL DOSEPAK) 4 MG tablet therapy pack       naproxen (NAPROSYN) 125 MG/5ML suspension      No Known Allergies  Family History  No family history on file.  Social History   Social History     Tobacco Use     Smoking status: Current Every Day Smoker   Substance Use Topics     Alcohol use: Yes     Drug use: Not Currently      Past medical history, past surgical history, medications, allergies, family history, and social history were reviewed with the patient. No additional pertinent items.       Review of Systems   Constitutional: Negative for fever.   HENT: Negative for congestion.    Eyes: Negative for redness.   Respiratory: Negative for shortness of breath.    Cardiovascular: Positive for chest pain.  Noted follow-up sched w/ HF NP on 10-5-23.  mg     Gastrointestinal: Negative for abdominal pain.   Genitourinary: Negative for difficulty urinating.   Musculoskeletal: Negative for arthralgias and neck stiffness.   Skin: Negative for color change.   Neurological: Negative for headaches.   Psychiatric/Behavioral: Negative for confusion.     A complete review of systems was performed with pertinent positives and negatives noted in the HPI, and all other systems negative.    Physical Exam   BP: (!) 119/93  Pulse: 59  Temp: 98  F (36.7  C)  Resp: 15  SpO2: 100 %  Physical Exam  Constitutional:       General: He is not in acute distress.     Appearance: He is not diaphoretic.   HENT:      Head: Atraumatic.   Eyes:      General: No scleral icterus.     Pupils: Pupils are equal, round, and reactive to light.   Cardiovascular:      Heart sounds: Normal heart sounds.   Pulmonary:      Effort: No respiratory distress.      Breath sounds: Normal breath sounds.   Abdominal:      General: Bowel sounds are normal.      Palpations: Abdomen is soft.      Tenderness: There is no abdominal tenderness.   Musculoskeletal:         General: No tenderness.   Skin:     General: Skin is warm.      Findings: No rash.         ED Course      Procedures             EKG Interpretation:      Interpreted by Jameson Dupont MD  Time reviewed: 8:40 AM  Symptoms at time of EKG: Right-sided chest and neck pain  Rhythm: sinus bradycardia  Rate: 50-60  Axis: Normal  Ectopy: none  Conduction: normal  ST Segments/ T Waves: Non-specific ST-T wave changes  Q Waves: none  Comparison to prior: No old EKG available    Clinical Impression: no acute changes                     HEART Score  Background  Calculates the overall risk of adverse event in patient's presenting with chest pain.  Based on 5 criteria (each assigned 0-2 points) including suspiciousness of history, EKG, age, risk factors and troponin.    Data  65 year old male  does not have a problem list on file.   reports that he has been smoking. He  does not have any smokeless tobacco history on file.  family history is not on file.  No results found for: TROPI  Criteria   0-2 points for each of 5 items (maximum of 10 points):  Score 0- History slightly suspicious for coronary syndrome  Score 0- EKG Normal  Score 1- Age 45 to 65 years old  Score 1- One to 2 risk factors for atherosclerotic disease  Score 0- Within normal limits for troponin levels  Interpretation  Risk of adverse outcome  Heart Score: 2  Total Score 0-3- Adverse Outcome Risk 2.5% - Supports early discharge with appropriate follow-up                 Results for orders placed or performed during the hospital encounter of 10/08/20   XR Chest 2 Views     Status: None    Narrative    CHEST TWO VIEWS  10/8/2020 9:46 AM     HISTORY: Right-sided chest pain.    COMPARISON: None available.      Impression    IMPRESSION: PA and lateral views of the chest were obtained.  Cardiomediastinal silhouette is within normal limits. No suspicious  focal pulmonary opacities. No significant pleural effusion or  pneumothorax.    DOM CUMMINGS MD   CBC with platelets differential     Status: None   Result Value Ref Range    WBC 8.4 4.0 - 11.0 10e9/L    RBC Count 5.84 4.4 - 5.9 10e12/L    Hemoglobin 16.1 13.3 - 17.7 g/dL    Hematocrit 50.1 40.0 - 53.0 %    MCV 86 78 - 100 fl    MCH 27.6 26.5 - 33.0 pg    MCHC 32.1 31.5 - 36.5 g/dL    RDW 14.4 10.0 - 15.0 %    Platelet Count 181 150 - 450 10e9/L    Diff Method Automated Method     % Neutrophils 59.9 %    % Lymphocytes 29.9 %    % Monocytes 7.3 %    % Eosinophils 2.2 %    % Basophils 0.5 %    % Immature Granulocytes 0.2 %    Nucleated RBCs 0 0 /100    Absolute Neutrophil 5.0 1.6 - 8.3 10e9/L    Absolute Lymphocytes 2.5 0.8 - 5.3 10e9/L    Absolute Monocytes 0.6 0.0 - 1.3 10e9/L    Absolute Eosinophils 0.2 0.0 - 0.7 10e9/L    Absolute Basophils 0.0 0.0 - 0.2 10e9/L    Abs Immature Granulocytes 0.0 0 - 0.4 10e9/L    Absolute Nucleated RBC 0.0    Comprehensive metabolic  panel     Status: Abnormal   Result Value Ref Range    Sodium 139 133 - 144 mmol/L    Potassium 3.4 3.4 - 5.3 mmol/L    Chloride 107 94 - 109 mmol/L    Carbon Dioxide 32 20 - 32 mmol/L    Anion Gap <1 (L) 3 - 14 mmol/L    Glucose 98 70 - 99 mg/dL    Urea Nitrogen 23 7 - 30 mg/dL    Creatinine 1.14 0.66 - 1.25 mg/dL    GFR Estimate 68 >60 mL/min/[1.73_m2]    GFR Estimate If Black 79 >60 mL/min/[1.73_m2]    Calcium 9.0 8.5 - 10.1 mg/dL    Bilirubin Total 0.5 0.2 - 1.3 mg/dL    Albumin 4.1 3.4 - 5.0 g/dL    Protein Total 7.7 6.8 - 8.8 g/dL    Alkaline Phosphatase 91 40 - 150 U/L    ALT 32 0 - 70 U/L    AST 25 0 - 45 U/L   UA reflex to Microscopic and Culture     Status: Abnormal    Specimen: Urine Midstream; Midstream Urine   Result Value Ref Range    Color Urine Yellow     Appearance Urine Clear     Glucose Urine Negative NEG^Negative mg/dL    Bilirubin Urine Negative NEG^Negative    Ketones Urine Negative NEG^Negative mg/dL    Specific Gravity Urine 1.026 1.003 - 1.035    Blood Urine Negative NEG^Negative    pH Urine 6.5 5.0 - 7.0 pH    Protein Albumin Urine Negative NEG^Negative mg/dL    Urobilinogen mg/dL 4.0 (H) 0.0 - 2.0 mg/dL    Nitrite Urine Negative NEG^Negative    Leukocyte Esterase Urine Negative NEG^Negative    Source Midstream Urine    D dimer quantitative     Status: None   Result Value Ref Range    D Dimer 0.4 0.0 - 0.50 ug/ml FEU   EKG 12 lead     Status: None (Preliminary result)   Result Value Ref Range    Interpretation ECG Click View Image link to view waveform and result    Troponin POCT     Status: None   Result Value Ref Range    Troponin I 0.01 0.00 - 0.08 ug/L     Medications - No data to display     Assessments & Plan (with Medical Decision Making)   65-year-old male who presents for evaluation of right-sided chest pain associated with neck and arm pain.  Differential includes ACS, PE, dissection, pneumothorax, pneumonia, mass, radiculopathy, zoster.  Exam revealed no acute findings with the  exception of slightly elevated blood pressure.  EKG and chest x-ray were normal.  Laboratories including d-dimer, troponin, CBC and comprehensive metabolic panel were all normal.  Given radiation into right medial dorsal arm and chest, suspect cervical radiculopathy.  Patient with normal neurologic exam.  I have recommended use of Tylenol, ibuprofen, Medrol Dosepak and follow-up with a primary care physician.    I have reviewed the nursing notes. I have reviewed the findings, diagnosis, plan and need for follow up with the patient.    New Prescriptions    METHYLPREDNISOLONE (MEDROL DOSEPAK) 4 MG TABLET THERAPY PACK    Follow package instructions       Final diagnoses:   Cervical radiculopathy   Other chest pain       --  Jameson Dupont MD  [facility] EMERGENCY DEPARTMENT  10/8/2020     Jameson Dupont MD  10/08/20 120

## 2023-09-30 DIAGNOSIS — E78.5 HYPERLIPIDEMIA, UNSPECIFIED HYPERLIPIDEMIA TYPE: ICD-10-CM

## 2023-10-02 RX ORDER — ATORVASTATIN CALCIUM 20 MG/1
20 TABLET, FILM COATED ORAL EVERY MORNING
Qty: 90 TABLET | Refills: 0 | Status: SHIPPED | OUTPATIENT
Start: 2023-10-02 | End: 2023-12-26

## 2023-11-30 ENCOUNTER — MEDICAL CORRESPONDENCE (OUTPATIENT)
Dept: HEALTH INFORMATION MANAGEMENT | Facility: CLINIC | Age: 68
End: 2023-11-30

## 2023-12-02 DIAGNOSIS — I73.00 RAYNAUD'S DISEASE WITHOUT GANGRENE: ICD-10-CM

## 2023-12-02 DIAGNOSIS — I10 PRIMARY HYPERTENSION: ICD-10-CM

## 2023-12-03 ASSESSMENT — PATIENT HEALTH QUESTIONNAIRE - PHQ9
SUM OF ALL RESPONSES TO PHQ QUESTIONS 1-9: 14
SUM OF ALL RESPONSES TO PHQ QUESTIONS 1-9: 14
10. IF YOU CHECKED OFF ANY PROBLEMS, HOW DIFFICULT HAVE THESE PROBLEMS MADE IT FOR YOU TO DO YOUR WORK, TAKE CARE OF THINGS AT HOME, OR GET ALONG WITH OTHER PEOPLE: SOMEWHAT DIFFICULT

## 2023-12-04 ENCOUNTER — VIRTUAL VISIT (OUTPATIENT)
Dept: FAMILY MEDICINE | Facility: CLINIC | Age: 68
End: 2023-12-04
Payer: COMMERCIAL

## 2023-12-04 DIAGNOSIS — M21.612 BUNION, LEFT FOOT: Primary | ICD-10-CM

## 2023-12-04 DIAGNOSIS — F33.1 MAJOR DEPRESSIVE DISORDER, RECURRENT EPISODE, MODERATE (H): ICD-10-CM

## 2023-12-04 DIAGNOSIS — J06.9 VIRAL UPPER RESPIRATORY TRACT INFECTION: ICD-10-CM

## 2023-12-04 DIAGNOSIS — R21 RASH OF FOOT: ICD-10-CM

## 2023-12-04 PROCEDURE — 96127 BRIEF EMOTIONAL/BEHAV ASSMT: CPT | Mod: 95 | Performed by: INTERNAL MEDICINE

## 2023-12-04 PROCEDURE — 99214 OFFICE O/P EST MOD 30 MIN: CPT | Mod: 95 | Performed by: INTERNAL MEDICINE

## 2023-12-04 RX ORDER — AMLODIPINE BESYLATE 10 MG/1
10 TABLET ORAL DAILY
Qty: 90 TABLET | Refills: 0 | Status: SHIPPED | OUTPATIENT
Start: 2023-12-04 | End: 2024-02-29

## 2023-12-04 RX ORDER — HYDROCHLOROTHIAZIDE 25 MG/1
TABLET ORAL
Qty: 90 TABLET | Refills: 0 | Status: SHIPPED | OUTPATIENT
Start: 2023-12-04 | End: 2024-02-29

## 2023-12-04 NOTE — PROGRESS NOTES
"    Instructions Relayed to Patient by Virtual Roomer:     Patient is active on [employer]:   Relayed following to patient: \"It looks like you are active on [employer], are you able to join the visit this way? If not, do you need us to send you a link now or would you like your provider to send a link via text or email when they are ready to initiate the visit?\"    Reminded patient to ensure they were logged on to virtual visit by arrival time listed. Documented in appointment notes if patient had flexibility to initiate visit sooner than arrival time. If pediatric virtual visit, ensured pediatric patient along with parent/guardian will be present for video visit.     Patient offered the website [email or web address] and/or phone number to [employer] Help line: [phone]      Answers submitted by the patient for this visit:  Patient Health Questionnaire (Submitted on 12/3/2023)  If you checked off any problems, how difficult have these problems made it for you to do your work, take care of things at home, or get along with other people?: Somewhat difficult  PHQ9 TOTAL SCORE: 14  General Questionnaire (Submitted on 12/4/2023)  Chief Complaint: Chronic problems general questions HPI Form  How many servings of fruits and vegetables do you eat daily?: 0-1  On average, how many sweetened beverages do you drink each day (Examples: soda, juice, sweet tea, etc.  Do NOT count diet or artificially sweetened beverages)?: 1  How many minutes a day do you exercise enough to make your heart beat faster?: 30 to 60  How many days a week do you exercise enough to make your heart beat faster?: 5  How many days per week do you miss taking your medication?: 0  Edin is a 68 year old who is being evaluated via a billable video visit.      How would you like to obtain your AVS? Calligo  If the video visit is dropped, the invitation should be resent by: Text to cell phone: [phone]  Will anyone else be joining your video " visit? No          Assessment & Plan     Edin was seen today for cough.    Diagnoses and all orders for this visit:    Bunion, left foot  -     Orthopedic  Referral; Future    Rash of foot  Comments:  may not be fungal. evaluate with podiatry.    Viral upper respiratory tract infection  Comments:  improving. declined xray.    Major depressive disorder, recurrent episode, moderate (H)  Comments:  worsening due to illness. not on medication. follow up with PCP in 2-4 weeks.  Orders:  -     TX BEHAV ASSMT W/SCORE & DOCD/STAND INSTRUMENT    Other orders  -     REVIEW OF HEALTH MAINTENANCE PROTOCOL ORDERS           Depression Screening Follow Up        12/3/2023     5:51 PM   PHQ   PHQ-9 Total Score 14   Q9: Thoughts of better off dead/self-harm past 2 weeks More than half the days   F/U: Thoughts of suicide or self-harm Yes   F/U: Self harm-plan No   F/U: Self-harm action No   F/U: Safety concerns No       Pt reported the high score mostly reflected last week when he was feeing bad from the illness and other challenges in life. This week, he is beginning to feel better.     Follow up with PCP in 2-4 weeks.         Branden Fenton MD PhD  [facility]   Edin is a 68 year old, presenting for the following health issues:  Cough  Pneumonia 1 or 2 weeks      Edin Robles is a 68 year old male had respiratory symptoms a lot of mucous and short of breast, bed ridden, couldn't walk from the front to the back of the house at first. He finally pulled out of it. He went back to work today after 10 days. He didn't have any fever. Had some chills and sweats.     Had joint pain and body aches. Still has them. Total exhaustion, mild headache, chills. Body aches.     Home covid test was negative.     He had RSV, covid, shingle shots 3 weeks prior to getting sick.     Hot water heater went out when he was sick. It was during the holidays, hard to get people in.     Finally his symptoms  are improving. He felt much better and went back to work.     He would like a referral for podiatry for bunion on the left foot. Unable to wear shoe on the left side.     Has a whitish spot between the big toe and second toe present for over a year. Treated with several rounds of antifungal cream. Not helping. The spot still there. Not changing.  No rash or involvement on any other part of the left foot and or the right foot.     History of Present Illness       Reason for visit:  Pneumonia  Symptom onset:  1-2 weeks ago  Symptoms include:  Coughing with mucus  Symptom intensity:  Mild  Symptom progression:  Improving  Had these symptoms before:  No  What makes it better:  Mucinex, robbitussin    He eats 0-1 servings of fruits and vegetables daily.He consumes 1 sweetened beverage(s) daily.He exercises with enough effort to increase his heart rate 30 to 60 minutes per day.  He exercises with enough effort to increase his heart rate 5 days per week.   He is taking medications regularly.           Review of Systems   Constitutional, HEENT, cardiovascular, pulmonary, gi and gu systems are negative, except as otherwise noted.      Objective           Vitals:  No vitals were obtained today due to virtual visit.    Physical Exam   GENERAL: Healthy, alert and no distress  EYES: Eyes grossly normal to inspection.  No discharge or erythema, or obvious scleral/conjunctival abnormalities.  RESP: No audible wheeze, cough, or visible cyanosis.  No visible retractions or increased work of breathing.    Left foot: bunion visible on the left foot. Unable to see the rash well.   NEURO: Cranial nerves grossly intact.  Mentation and speech appropriate for age.  PSYCH: Mentation appears normal, affect normal/bright, judgement and insight intact, normal speech and appearance well-groomed.        Video-Visit Details    Type of service:  Video Visit   Video Start Time:  4:33 PM  Video End Time:4:47 PM    Originating Location (pt.  Location): Home    Distant Location (provider location):  Off-site  Platform used for Video Visit: Renetta

## 2023-12-08 ENCOUNTER — ANCILLARY PROCEDURE (OUTPATIENT)
Dept: GENERAL RADIOLOGY | Facility: CLINIC | Age: 68
End: 2023-12-08
Attending: PODIATRIST
Payer: COMMERCIAL

## 2023-12-08 ENCOUNTER — OFFICE VISIT (OUTPATIENT)
Dept: PODIATRY | Facility: CLINIC | Age: 68
End: 2023-12-08
Attending: INTERNAL MEDICINE
Payer: COMMERCIAL

## 2023-12-08 VITALS — DIASTOLIC BLOOD PRESSURE: 90 MMHG | WEIGHT: 200 LBS | SYSTOLIC BLOOD PRESSURE: 132 MMHG | BODY MASS INDEX: 27.12 KG/M2

## 2023-12-08 DIAGNOSIS — M20.22 HALLUX RIGIDUS OF LEFT FOOT: Primary | ICD-10-CM

## 2023-12-08 DIAGNOSIS — M25.572 PAIN IN JOINT OF LEFT FOOT: ICD-10-CM

## 2023-12-08 DIAGNOSIS — M77.52 BONE SPUR OF LEFT FOOT: ICD-10-CM

## 2023-12-08 DIAGNOSIS — L98.8 SKIN MACERATION: ICD-10-CM

## 2023-12-08 DIAGNOSIS — M21.612 BUNION, LEFT FOOT: ICD-10-CM

## 2023-12-08 PROCEDURE — 99203 OFFICE O/P NEW LOW 30 MIN: CPT | Performed by: PODIATRIST

## 2023-12-08 PROCEDURE — 73630 X-RAY EXAM OF FOOT: CPT | Mod: TC | Performed by: RADIOLOGY

## 2023-12-08 ASSESSMENT — PAIN SCALES - GENERAL: PAINLEVEL: SEVERE PAIN (6)

## 2023-12-08 NOTE — PATIENT INSTRUCTIONS
"Thank you for choosing [facility] Podiatry / Foot & Ankle Surgery!    DR. LEIVA'S CLINIC LOCATIONS:     [facility] TRIAGE LINE: [phone]   [address] APPOINTMENTS: [phone]   [place] [address] RADIOLOGY: [phone]   (Every other Tues - Wed - Fri PM) SET UP SURGERY: [phone]    PHYSICAL THERAPY: [phone]   [facility] SPECIALTY BILLING QUESTIONS: [phone] [address] FAX: [phone]   [place] [address]    (Thurs & Fri AM)         DEGENERATIVE ARTHRITIS OF THE BIG TOE JOINT   (hallux limitus/hallux rigidus)   Arthritis of the joint at the base of the big toe (metatarsophalangeal joint) has several causes. Usually it results from repetitive trauma to the joint, secondary to abnormal foot mechanics. Often it is hereditary. However, a one-time traumatic event can lead to arthritis. The condition doesn't improve with time, and even with treatment, can worsen. The cartilage wears out, joint surfaces are no longer smooth, bone rubs on bone, inflammation occurs with pain, and eventually bone spurs and loose fragments might develop.   The joint is often painful with activity, worse with flimsy shoes or walking barefoot, and it slowly progresses over time. A person might notice the toe \"locking up\" with walking. There often is an obvious, and irritating, bony bump on top of the foot. Shoes might be uncomfortable. In some people the pain is so bothersome that recreational activities sometimes even normal daily activities are difficult to perform.   The pain from this arthritis is likely a combination of joint jamming, cartilage loss and inflammation, and irritation from shoes rubbing on the bump. Sometimes other parts of the foot, leg, or back hurt from altering one's walk to compensate for the painful joint.     Ways to help a person live with the discomfort include wearing a good, supportive shoe with a rigid, rocker-type bottom. An example is a hiking boot. A " rigid sole minimizes bending of the joint, and therefore, joint motion and pain. Shoes with a high toe box allow for less rubbing on the bump. Avoiding barefoot walking, sandals, flip-flops and slippers usually helps.   Sometimes an insert or orthotic provides symptom relief. This might make shoe fit more difficult. Pads over the bump and occasionally injections into the joint provide relief.   Surgery for this condition is aimed towards alleviating pain. It does not cure the arthritis nor does it guarantee better joint motion. Depending on the condition of the metatarsophalangeal joint, there are several surgiqal options:    1.  Cutting off the bony bump(s) and cleaning the joint    2.  Loosening the joint up by making cuts in the first metatarsal bone or the big toe bone and removing a small section of bone.    3.  Repositioning bone to minimize jamming of the joint.    4.  In severe cases, the joint is fused. By fusing the joint, it will never bend again. This resolves the pain, because it's the movement of a worn out joint that causes pain. Oftentimes the operation involves a combination of these procedures and. requires the use of screws, pins, and/or a small surgical plate.     Healing after surgery requires about six weeks of protection. This allows the bone to heal. Maximum recovery takes about one year. The scar tissue and joint structures require this amount of time to finish the healing process. Expect stiffness, swelling and numbness during that time frame.   Surgery for arthritis of the metatarsophalangeal joint does involve side effects. Some side effects are predictable and others are less common but do occur. A scar will be visible and could be irritated by shoes. The shoe may rub on the screw or internal pin requiring surgical removal of these fixation devices. The screw and pin would likely be left in place for a full year. The first toe may remain stiff after surgery. The amount of stiffness is  variable. Most people never regain normal motion of the first toe. This is due to scar tissue inherent to any surgery, in addition to the cumUlative effects of arthritis. Sometimes the big toe drifts to one side or the other. Joint fusion is one option to correct an unstable, drifting toe. This procedure straightens the toe, however, no motion remains.   All surgical procedures involve risk of infection, numbness, pain, delayed bone healing, osteotomy (bone cut) dislocation, blood clots, continued foot pain, etc. Arthritic joint surgery is quite complex and should not be taken lightly.    Any skin incision can lead to infection. Deep infection might involve the bone and thus repeat surgery and six weeks of IV antibiotics. Scar tissue can cause nerve pain or numbness. his is generally temporary but can be permanent. We do not have treatments that cure nerve problems. Second toe pain could be related to altered mechanics and pressure transferred to the second toe. Delayed bone healing would lengthen the healing time. Some bones simply do not heal. This requires repeat surgery, electronic bone stimulation and/or extended protection. Smokers have an approximate 20% chance of poor bone healing. This is double that of a non-smoker. The bone cut may displace. This may need to be repaired with a second operation. Displacement can cause joint malalignment. Immobility after surgery can cause a blood clot in the legs and lungs. This could result in death.   Foot pain is complex. Most feet hurt for more than one reason. Operating on the arthritic   big toe joint will not necessarily create a pain free foot. Appropriate shoes, healthy body weight, avoidance of bare foot walking and moderation of activity will always be   necessary to enjoy foot comfort. Arthritis is incurable even with surgery.     Surgery for this type of arthritis is nevertheless quite successful. Most surgical patients are pleased with their foot following  surgery. Many of the issues described above can be controlled by taking proper care of your foot during the healing process.   Cosmetic bump surgery is discouraged for the reasons listed above. A bump and joint that is comfortable when wearing appropriate shoes should simply be treated with appropriate shoes.   Your surgeon would be happy to fully describe any of the above issues. You should pursue a full understanding of the operation, recovery process and any potential problems that could develop.

## 2023-12-08 NOTE — PROGRESS NOTES
ASSESSMENT:  Encounter Diagnoses   Name Primary?    Hallux rigidus of left foot Yes    Bone spur of left foot     Pain in joint of left foot     Bunion, left foot     Skin maceration      MEDICAL DECISION MAKING:  I personally reviewed the left foot x-ray images with Edin.  Advanced degenerative joint disease of the first metatarsal phalangeal joint with obliteration of the joint space, sclerosis, periarticular spurring.    We discussed conservative cares including shoes that accommodate the forefoot with/spur as well as shoes with a more rigid sole.  He alternates to multiple shoes.  Pain persist.    Is interested in surgical intervention.  We discussed both the cheilectomy procedure  As well as first metatarsal phalangeal joint arthrodesis.  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  Arthrodesis would be technically difficult and might involve resection of bone.    He is interested in proceeding with cheilectomy, as the majority of his pain is related to the bony bump or irritation footwear.    I think this is reasonable.  Case request is placed.    I am not convinced that the macerated skin in the left fourth webspace is tinea pedis.  I advised him to dry between the toes after bathing.  He is then to try to dry the area out via alcohol swabs  or Betadine application.  A breathable toe spacer is also recommended.  We could potentially consider removal of bone from this toe to minimize its tight contact against the fourth toe.    Disclaimer: This note consists of symbols derived from keyboarding, dictation and/or voice recognition software. As a result, there may be errors in the script that have gone undetected. Please consider this when interpreting information found in this chart.    Doni Ivan DPM, FACFAS, [facility] Department of Podiatry/Foot & Ankle Surgery      ____________________________________________________________________    HPI:       I was asked by Dr. Branden Fenton to evaluate this patient for a  bunion type concern.  Edin presents today with pain related to what he calls a bunion on his left foot.  This is bothered him for 2 years.  Burning, aching, throbbing and shooting pain rated as high as a 7 out of 10  Pain is fairly constant.  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    He also inquires about fungus and specifies the left fourth webspace.  He has tried antifungals without success.  *  Past Medical History:   Diagnosis Date    Colon cancer (H) 3/8/2023    Depressive disorder 1995    Hypertension    *  *  Past Surgical History:   Procedure Laterality Date    COLONOSCOPY N/A 01/06/2023    Procedure: COLONOSCOPY, WITH POLYPECTOMY AND BIOPSY;  Surgeon: Liborio Tucker MD;  Location: [facility] OR    COLONOSCOPY      EYE SURGERY      HERNIORRHAPHY UMBILICAL N/A 3/8/2023    Procedure: Herniorrhaphy umbilical Repair;  Surgeon: Nir Escobar MD;  Location: [facility] OR   *  *  Current Outpatient Medications   Medication Sig Dispense Refill    acetaminophen (TYLENOL) 500 MG tablet Take 2 tablets (1,000 mg) by mouth every 6 hours 100 tablet 0    amLODIPine (NORVASC) 10 MG tablet TAKE 1 TABLET (10 MG) BY MOUTH DAILY. 90 tablet 0    atorvastatin (LIPITOR) 20 MG tablet TAKE 1 TABLET (20 MG) BY MOUTH EVERY MORNING +++ APPOINTMENT NEEDED FOR ADDITIONAL REFILLS +++ 90 tablet 0    Bioflavonoid Products (VITAMIN C) CHEW Take by mouth every morning      calcium carbonate-vitamin D (OSCAL) 250-3.125 MG-MCG TABS per tablet Take 1 tablet by mouth every morning      gabapentin (NEURONTIN) 100 MG capsule Take 1 capsule (100 mg) by mouth At Bedtime 14 capsule 0    hydrochlorothiazide (HYDRODIURIL) 25 MG tablet TAKE 1 TABLET BY MOUTH EVERY DAY 90 tablet 0    hydrOXYzine (ATARAX) 25 MG tablet Take 1 tablet (25 mg) by mouth every 8 hours as needed for anxiety 5 tablet 0    methocarbamol (ROBAXIN) 500 MG tablet Take 1 tablet (500 mg) by mouth 4 times daily 50 tablet 0    Moringa Oleifera  (MORINGA PO) Take by mouth every morning      Omega-3 Fatty Acids (FISH OIL) 1200 MG capsule Take 1,200 mg by mouth every morning      oxyCODONE (ROXICODONE) 5 MG tablet Take 1 tablet (5 mg) by mouth every 4 hours as needed for moderate pain (4-6) 20 tablet 0    TURMERIC PO Take by mouth every morning      UNABLE TO FIND every morning MEDICATION NAME: Brian      vitamin B complex with vitamin C (STRESS TAB) tablet Take 1 tablet by mouth every morning           EXAM:    Vitals: BP (!) 132/90   Wt 90.7 kg (200 lb)   BMI 27.12 kg/m    BMI: Body mass index is 27.12 kg/m .    Constitutional:  Edin Robles is in no apparent distress, appears well-nourished.  Cooperative with history and physical exam.    Vascular:  Pedal pulses are palpable for both the DP and PT arteries.  CFT < 3 sec.  No edema.      Neuro: Light touch sensation is intact to the L4, L5, S1 distributions  No evidence of weakness, spasticity, or contracture in the lower extremities.     Derm: Normal texture and turgor.  No erythema, ecchymosis, or cyanosis.  No open lesions.   There is some hyperkeratotic, macerated skin in the left fourth webspace.  No erythema.  No wound.    Musculoskeletal:    Lower extremity muscle strength is normal.  Pes planus.  Abduction of the hallux which contacts the second toe.  Rigid left first metatarsophalangeal joint.  Prominent dorsal medial bony eminence at this level.    X-Ray Findings:  I personally reviewed the left foot images.  See comments above

## 2023-12-08 NOTE — LETTER
12/8/2023         RE: Edin Robles  [address] [place] [id number]        Dear Colleague,    Thank you for referring your patient, Edin Robles, to the [facility]. Please see a copy of my visit note below.    ASSESSMENT:  Encounter Diagnoses   Name Primary?    Hallux rigidus of left foot Yes    Bone spur of left foot     Pain in joint of left foot     Bunion, left foot     Skin maceration      MEDICAL DECISION MAKING:  I personally reviewed the left foot x-ray images with Edin.  Advanced degenerative joint disease of the first metatarsal phalangeal joint with obliteration of the joint space, sclerosis, periarticular spurring.    We discussed conservative cares including shoes that accommodate the forefoot with/spur as well as shoes with a more rigid sole.  He alternates to multiple shoes.  Pain persist.    Is interested in surgical intervention.  We discussed both the cheilectomy procedure  As well as first metatarsal phalangeal joint arthrodesis.  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  Arthrodesis would be technically difficult and might involve resection of bone.    He is interested in proceeding with cheilectomy, as the majority of his pain is related to the bony bump or irritation footwear.    I think this is reasonable.  Case request is placed.    I am not convinced that the macerated skin in the left fourth webspace is tinea pedis.  I advised him to dry between the toes after bathing.  He is then to try to dry the area out via alcohol swabs  or Betadine application.  A breathable toe spacer is also recommended.  We could potentially consider removal of bone from this toe to minimize its tight contact against the fourth toe.    Disclaimer: This note consists of symbols derived from keyboarding, dictation and/or voice recognition software. As a result, there may be errors in the script that have gone undetected. Please consider this when interpreting information  found in this chart.    Doni Ivan, DPFANTA, FACFAS, MS    [facility] Department of Podiatry/Foot & Ankle Surgery      ____________________________________________________________________    HPI:       Edin presents today with pain related to what he calls a bunion on his left foot.  This is bothered him for 2 years.  Burning, aching, throbbing and shooting pain rated as high as a 7 out of 10  Pain is fairly constant.  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    He also inquires about fungus and specifies the left fourth webspace.  He has tried antifungals without success.  *  Past Medical History:   Diagnosis Date    Colon cancer (H) 3/8/2023    Depressive disorder 1995    Hypertension    *  *  Past Surgical History:   Procedure Laterality Date    COLONOSCOPY N/A 01/06/2023    Procedure: COLONOSCOPY, WITH POLYPECTOMY AND BIOPSY;  Surgeon: Liborio Tucker MD;  [place] [facility] [place]      EYE SURGERY      HERNIORRHAPHY UMBILICAL N/A 3/8/2023    Procedure: Herniorrhaphy umbilical Repair;  Surgeon: Nir Escobar MD;  Location:  OR   *  *  Current Outpatient Medications   Medication Sig Dispense Refill    acetaminophen (TYLENOL) 500 MG tablet Take 2 tablets (1,000 mg) by mouth every 6 hours 100 tablet 0    amLODIPine (NORVASC) 10 MG tablet TAKE 1 TABLET (10 MG) BY MOUTH DAILY. 90 tablet 0    atorvastatin (LIPITOR) 20 MG tablet TAKE 1 TABLET (20 MG) BY MOUTH EVERY MORNING +++ APPOINTMENT NEEDED FOR ADDITIONAL REFILLS +++ 90 tablet 0    Bioflavonoid Products (VITAMIN C) CHEW Take by mouth every morning      calcium carbonate-vitamin D (OSCAL) 250-3.125 MG-MCG TABS per tablet Take 1 tablet by mouth every morning      gabapentin (NEURONTIN) 100 MG capsule Take 1 capsule (100 mg) by mouth At Bedtime 14 capsule 0    hydrochlorothiazide (HYDRODIURIL) 25 MG tablet TAKE 1 TABLET BY MOUTH EVERY DAY 90 tablet 0    hydrOXYzine (ATARAX) 25 MG tablet Take 1 tablet (25 mg)  by mouth every 8 hours as needed for anxiety 5 tablet 0    methocarbamol (ROBAXIN) 500 MG tablet Take 1 tablet (500 mg) by mouth 4 times daily 50 tablet 0    Moringa Oleifera (MORINGA PO) Take by mouth every morning      Omega-3 Fatty Acids (FISH OIL) 1200 MG capsule Take 1,200 mg by mouth every morning      oxyCODONE (ROXICODONE) 5 MG tablet Take 1 tablet (5 mg) by mouth every 4 hours as needed for moderate pain (4-6) 20 tablet 0    TURMERIC PO Take by mouth every morning      UNABLE TO FIND every morning MEDICATION NAME: Brian      vitamin B complex with vitamin C (STRESS TAB) tablet Take 1 tablet by mouth every morning           EXAM:    Vitals: BP (!) 132/90   Wt 90.7 kg (200 lb)   BMI 27.12 kg/m    BMI: Body mass index is 27.12 kg/m .    Constitutional:  Edin Robles is in no apparent distress, appears well-nourished.  Cooperative with history and physical exam.    Vascular:  Pedal pulses are palpable for both the DP and PT arteries.  CFT < 3 sec.  No edema.      Neuro: Light touch sensation is intact to the L4, L5, S1 distributions  No evidence of weakness, spasticity, or contracture in the lower extremities.     Derm: Normal texture and turgor.  No erythema, ecchymosis, or cyanosis.  No open lesions.   There is some hyperkeratotic, macerated skin in the left fourth webspace.  No erythema.  No wound.    Musculoskeletal:    Lower extremity muscle strength is normal.  Pes planus.  Abduction of the hallux which contacts the second toe.  Rigid left first metatarsophalangeal joint.  Prominent dorsal medial bony eminence at this level.    X-Ray Findings:  I personally reviewed the left foot images.  See comments above          Again, thank you for allowing me to participate in the care of your patient.        Sincerely,        Doni Ivan DPM

## 2023-12-20 ENCOUNTER — PATIENT OUTREACH (OUTPATIENT)
Dept: GASTROENTEROLOGY | Facility: CLINIC | Age: 68
End: 2023-12-20
Payer: COMMERCIAL

## 2023-12-25 DIAGNOSIS — E78.5 HYPERLIPIDEMIA, UNSPECIFIED HYPERLIPIDEMIA TYPE: ICD-10-CM

## 2023-12-26 RX ORDER — ATORVASTATIN CALCIUM 20 MG/1
20 TABLET, FILM COATED ORAL EVERY MORNING
Qty: 30 TABLET | Refills: 0 | Status: SHIPPED | OUTPATIENT
Start: 2023-12-26 | End: 2024-01-23

## 2024-01-14 ENCOUNTER — HEALTH MAINTENANCE LETTER (OUTPATIENT)
Age: 69
End: 2024-01-14

## 2024-02-07 ENCOUNTER — MYC MEDICAL ADVICE (OUTPATIENT)
Dept: FAMILY MEDICINE | Facility: CLINIC | Age: 69
End: 2024-02-07
Payer: COMMERCIAL

## 2024-02-07 ENCOUNTER — TELEPHONE (OUTPATIENT)
Dept: FAMILY MEDICINE | Facility: CLINIC | Age: 69
End: 2024-02-07
Payer: COMMERCIAL

## 2024-02-07 NOTE — TELEPHONE ENCOUNTER
Patient Quality Outreach    Patient is due for the following:   Colon Cancer Screening  Depression  -  PHQ-9 needed and DAP  Physical Annual Wellness Visit      Topic Date Due    COVID-19 Vaccine (6 - 2023-24 season) 09/01/2023       Next Steps:   Schedule annual wellness appointment     Type of outreach:    Sent [employer] message.    Next Steps:  Reach out within 90 days via [employer].    Max number of attempts reached: No. Will try again in 90 days if patient still on fail list.    Questions for provider review:    None           Krystin Pickard MA

## 2024-02-14 DIAGNOSIS — E78.5 HYPERLIPIDEMIA, UNSPECIFIED HYPERLIPIDEMIA TYPE: ICD-10-CM

## 2024-02-14 RX ORDER — ATORVASTATIN CALCIUM 20 MG/1
20 TABLET, FILM COATED ORAL EVERY MORNING
Qty: 30 TABLET | Refills: 0 | Status: SHIPPED | OUTPATIENT
Start: 2024-02-14 | End: 2024-04-29

## 2024-02-15 NOTE — TELEPHONE ENCOUNTER
Patient needs to be seen by  Lab (provider or resource)  Is there a time frame in which the patient should be seen Yes: within month  What does the patient need to be seen regarding hyperlipidemia,    Does patient need to come fasting Yes  Phone: [phone] (home)  When workflow completed Close Encounter    Clinic: [facility] at [phone]    'Hi, this is (your name) I am calling from (provider's name) office. After reviewing your chart, (Provider's name) has indicated that you need an appointment to review (reason for visit). May I assist you in making this appointment? (Please contact us via [employer] or by phone at (Clinic name and Phone number) to schedule this appointment at your earliest convenience)'    Was first outreach attempted?No  If yes, the Second attempt due on:

## 2024-02-19 ASSESSMENT — PATIENT HEALTH QUESTIONNAIRE - PHQ9
SUM OF ALL RESPONSES TO PHQ QUESTIONS 1-9: 7
10. IF YOU CHECKED OFF ANY PROBLEMS, HOW DIFFICULT HAVE THESE PROBLEMS MADE IT FOR YOU TO DO YOUR WORK, TAKE CARE OF THINGS AT HOME, OR GET ALONG WITH OTHER PEOPLE: SOMEWHAT DIFFICULT
SUM OF ALL RESPONSES TO PHQ QUESTIONS 1-9: 7

## 2024-02-20 NOTE — TELEPHONE ENCOUNTER
12/5/2022     5:36 AM 12/3/2023     5:51 PM 2/19/2024     3:12 PM   PHQ   PHQ-9 Total Score 8 14 7   Q9: Thoughts of better off dead/self-harm past 2 weeks Not at all More than half the days Not at all   F/U: Thoughts of suicide or self-harm  Yes    F/U: Self harm-plan  No    F/U: Self-harm action  No    F/U: Safety concerns  No

## 2024-02-29 DIAGNOSIS — I10 PRIMARY HYPERTENSION: ICD-10-CM

## 2024-02-29 DIAGNOSIS — I73.00 RAYNAUD'S DISEASE WITHOUT GANGRENE: ICD-10-CM

## 2024-02-29 RX ORDER — HYDROCHLOROTHIAZIDE 25 MG/1
TABLET ORAL
Qty: 90 TABLET | Refills: 0 | Status: SHIPPED | OUTPATIENT
Start: 2024-02-29 | End: 2024-05-29

## 2024-02-29 RX ORDER — AMLODIPINE BESYLATE 10 MG/1
10 TABLET ORAL DAILY
Qty: 90 TABLET | Refills: 0 | Status: SHIPPED | OUTPATIENT
Start: 2024-02-29 | End: 2024-05-29

## 2024-03-18 DIAGNOSIS — E78.5 HYPERLIPIDEMIA, UNSPECIFIED HYPERLIPIDEMIA TYPE: ICD-10-CM

## 2024-03-18 RX ORDER — ATORVASTATIN CALCIUM 20 MG/1
20 TABLET, FILM COATED ORAL EVERY MORNING
Qty: 30 TABLET | Refills: 0 | OUTPATIENT
Start: 2024-03-18

## 2024-04-12 DIAGNOSIS — E78.5 HYPERLIPIDEMIA, UNSPECIFIED HYPERLIPIDEMIA TYPE: ICD-10-CM

## 2024-04-12 RX ORDER — ATORVASTATIN CALCIUM 20 MG/1
20 TABLET, FILM COATED ORAL EVERY MORNING
Qty: 30 TABLET | Refills: 0 | OUTPATIENT
Start: 2024-04-12

## 2024-04-29 DIAGNOSIS — E78.5 HYPERLIPIDEMIA, UNSPECIFIED HYPERLIPIDEMIA TYPE: ICD-10-CM

## 2024-04-29 RX ORDER — ATORVASTATIN CALCIUM 20 MG/1
20 TABLET, FILM COATED ORAL EVERY MORNING
Qty: 30 TABLET | Refills: 0 | Status: SHIPPED | OUTPATIENT
Start: 2024-04-29 | End: 2024-06-03

## 2024-04-30 ENCOUNTER — TELEPHONE (OUTPATIENT)
Dept: FAMILY MEDICINE | Facility: CLINIC | Age: 69
End: 2024-04-30
Payer: COMMERCIAL

## 2024-04-30 NOTE — TELEPHONE ENCOUNTER
Patient Quality Outreach    Patient is due for the following:   Colon Cancer Screening  Depression  -  PHQ-9 needed and DAP  Physical Annual Wellness Visit      Topic Date Due    COVID-19 Vaccine (6 - 2023-24 season) 09/01/2023       Next Steps:   Schedule annual wellness visit and colon cancer screening appointment     Type of outreach:    Sent [employer] message.    Next Steps:  Reach out within 90 days via [employer].    Max number of attempts reached: No. Will try again in 90 days if patient still on fail list.    Questions for provider review:    None           Krystni Pickard MA

## 2024-05-22 ENCOUNTER — OFFICE VISIT (OUTPATIENT)
Dept: FAMILY MEDICINE | Facility: CLINIC | Age: 69
End: 2024-05-22
Payer: COMMERCIAL

## 2024-05-22 ENCOUNTER — OFFICE VISIT (OUTPATIENT)
Dept: PODIATRY | Facility: CLINIC | Age: 69
End: 2024-05-22
Payer: COMMERCIAL

## 2024-05-22 ENCOUNTER — ANCILLARY PROCEDURE (OUTPATIENT)
Dept: GENERAL RADIOLOGY | Facility: CLINIC | Age: 69
End: 2024-05-22
Attending: FAMILY MEDICINE
Payer: COMMERCIAL

## 2024-05-22 VITALS
OXYGEN SATURATION: 96 % | BODY MASS INDEX: 29.7 KG/M2 | HEART RATE: 61 BPM | RESPIRATION RATE: 15 BRPM | WEIGHT: 219 LBS | SYSTOLIC BLOOD PRESSURE: 114 MMHG | DIASTOLIC BLOOD PRESSURE: 80 MMHG | TEMPERATURE: 97.9 F

## 2024-05-22 VITALS — BODY MASS INDEX: 29.7 KG/M2 | DIASTOLIC BLOOD PRESSURE: 80 MMHG | WEIGHT: 219 LBS | SYSTOLIC BLOOD PRESSURE: 114 MMHG

## 2024-05-22 DIAGNOSIS — M20.22 HALLUX RIGIDUS OF LEFT FOOT: ICD-10-CM

## 2024-05-22 DIAGNOSIS — R06.09 DOE (DYSPNEA ON EXERTION): ICD-10-CM

## 2024-05-22 DIAGNOSIS — M79.672 LEFT FOOT PAIN: ICD-10-CM

## 2024-05-22 DIAGNOSIS — Z01.818 PREOP GENERAL PHYSICAL EXAM: Primary | ICD-10-CM

## 2024-05-22 DIAGNOSIS — C18.9 MALIGNANT NEOPLASM OF COLON, UNSPECIFIED PART OF COLON (H): ICD-10-CM

## 2024-05-22 DIAGNOSIS — M77.52 BONE SPUR OF LEFT FOOT: Primary | ICD-10-CM

## 2024-05-22 DIAGNOSIS — E78.5 HYPERLIPIDEMIA, UNSPECIFIED HYPERLIPIDEMIA TYPE: ICD-10-CM

## 2024-05-22 DIAGNOSIS — I10 PRIMARY HYPERTENSION: ICD-10-CM

## 2024-05-22 DIAGNOSIS — M21.612 BUNION, LEFT FOOT: ICD-10-CM

## 2024-05-22 PROBLEM — F14.11 COCAINE ABUSE IN REMISSION (H): Status: RESOLVED | Noted: 2022-12-06 | Resolved: 2024-05-22

## 2024-05-22 PROBLEM — F33.1 MAJOR DEPRESSIVE DISORDER, RECURRENT EPISODE, MODERATE (H): Status: RESOLVED | Noted: 2022-08-02 | Resolved: 2024-05-22

## 2024-05-22 LAB
ERYTHROCYTE [DISTWIDTH] IN BLOOD BY AUTOMATED COUNT: 14.2 % (ref 10–15)
HCT VFR BLD AUTO: 45.2 % (ref 40–53)
HGB BLD-MCNC: 15.3 G/DL (ref 13.3–17.7)
MCH RBC QN AUTO: 29.1 PG (ref 26.5–33)
MCHC RBC AUTO-ENTMCNC: 33.8 G/DL (ref 31.5–36.5)
MCV RBC AUTO: 86 FL (ref 78–100)
PLATELET # BLD AUTO: 194 10E3/UL (ref 150–450)
RBC # BLD AUTO: 5.25 10E6/UL (ref 4.4–5.9)
WBC # BLD AUTO: 8.2 10E3/UL (ref 4–11)

## 2024-05-22 PROCEDURE — 36415 COLL VENOUS BLD VENIPUNCTURE: CPT | Performed by: FAMILY MEDICINE

## 2024-05-22 PROCEDURE — 85027 COMPLETE CBC AUTOMATED: CPT | Performed by: FAMILY MEDICINE

## 2024-05-22 PROCEDURE — 99213 OFFICE O/P EST LOW 20 MIN: CPT | Performed by: PODIATRIST

## 2024-05-22 PROCEDURE — 80053 COMPREHEN METABOLIC PANEL: CPT | Performed by: FAMILY MEDICINE

## 2024-05-22 PROCEDURE — 99214 OFFICE O/P EST MOD 30 MIN: CPT | Performed by: FAMILY MEDICINE

## 2024-05-22 PROCEDURE — 80061 LIPID PANEL: CPT | Performed by: FAMILY MEDICINE

## 2024-05-22 PROCEDURE — 93000 ELECTROCARDIOGRAM COMPLETE: CPT | Performed by: FAMILY MEDICINE

## 2024-05-22 PROCEDURE — 71046 X-RAY EXAM CHEST 2 VIEWS: CPT | Mod: TC | Performed by: RADIOLOGY

## 2024-05-22 ASSESSMENT — PAIN SCALES - GENERAL: PAINLEVEL: MILD PAIN (3)

## 2024-05-22 ASSESSMENT — PATIENT HEALTH QUESTIONNAIRE - PHQ9
SUM OF ALL RESPONSES TO PHQ QUESTIONS 1-9: 2
10. IF YOU CHECKED OFF ANY PROBLEMS, HOW DIFFICULT HAVE THESE PROBLEMS MADE IT FOR YOU TO DO YOUR WORK, TAKE CARE OF THINGS AT HOME, OR GET ALONG WITH OTHER PEOPLE: NOT DIFFICULT AT ALL
SUM OF ALL RESPONSES TO PHQ QUESTIONS 1-9: 2

## 2024-05-22 NOTE — PATIENT INSTRUCTIONS
Thank you for choosing [facility] / [facility] Ankle Surgery!    DR. LEIVA'S CLINIC LOCATIONS:     [facility] TRIAGE LINE: [phone]   [address] APPOINTMENTS: [phone]   [place] [address] RADIOLOGY: [phone]   (Every other Tues - Wed - Fri PM) SET UP SURGERY: [phone]    PHYSICAL THERAPY: [phone]   [facility] BILLING QUESTIONS: [phone] [address] FAX: [phone]   [place] [address]    (Thurs & Fri AM)         REVIEW OF SURGICAL RISKS  The following is a list of possible risks associated with foot and ankle surgery. This is not all-inclusive. Please realize that risks associated with elective foot surgery are low and there are ways to deal with them when they occur.     1) Infection:  Probably the most concerning and discussed risk of surgery, the chance of infection is about 1% with elective surgery. Often it involves the skin around the incision and resolves with oral antibiotics. It is rare that infection will be deep and require surgical intervention. You will receive an antibiotic prior to surgery.    2)  Pain: Surgery is trauma to the foot. Therefore a certain amount of pain is to be expected. This will be most bothersome during the first 1-2 days. Taking your pain medication, elevating the extremity above heart level, and using ice are all very important for adequate pain management.     3)  Swelling: This is due to the trauma of surgery. A certain degree of swelling is normal. However, if there is too much, it can impair healing, increase the risk of infection, add to your pain, and linger for several months after surgery making return to normal shoes difficult. Elevating the limb is extremely important.    4)  Healing Complications: There are many factors that can impair healing. There is no way to speed up the biological rate of healing. People tend to find ways to slow it down. Proper nutrition, not smoking, following the  instructions provided by your surgeon are ways to help with normal healing.    Sometimes the skin is slow to heal, and an open area along the incision develops.  If this happens, it cannot be stitched closed. It then needs to heal from the inside out. Rarely there will be an issue with bone healing, which might require a special device called a bone stimulator and/or a revision surgery.    5)  Temporary and Permanent Numbness: Numbness beyond the area of surgery is to be expected. Initially it is from the local anesthetic that was injected into your foot. This wears off within 24 hours. Continued numbness or tingling is usually from swelling that compresses the nerves. Numbness that lasts for weeks is from nerve injury/irritation. This might eventually resolve or be permanent.      6)  Blood Clot:  Although rare, this is potentially the most dangerous risk associated with foot surgery. A blood clot in the leg can lead to varicose veins and chronic swelling. A blood clot that travels to the lungs is a very serious condition requiring hospitalization. Increased risk is related to trauma of surgery and degree of immobilization. There are many other risk factors that are not related directly to surgery (smoking, family history, personal history of varicose veins and/or blood clots, cancer, high fat cholesterol and lipids). Your surgeon will discuss this with you and devise an appropriate plan to help prevent this complication.    7)  Hypertrophic Scar: Some people have skin that is prone to forming exaggerated scar tissue. This can be unsightly and uncomfortable. There are ways to treat it.        8)  Complex Regional Pain Syndrome:  Rarely some people have pain that is out of proportion to the surgical procedure and harder to get control of. This pain can linger and cause changes in skin temperature and appearance. If this occurs, physical therapy and/or a pain specialist might be needed.      9) There are also  "intra-operative challenges and complications that can occur.   Intra-operative challenges can involve finding poor bone quality, difficulty with the internal fixation (when used), and even inadvertent injury to neighboring structures that would then need to be repaired.     Please remember that surgical complications are rare. Your surgeon has a plan to deal with them, should one occur. The primary goal of surgery is pain reduction. There are no guarantees. An \"abnormal\" foot prior to surgery, is not necessarily a \"normal\" foot afterwards. Hopefully it is a less painful one.      If you have any questions, please discuss with Dr. Ivan prior to surgery.        POST-OPERATIVE CARE RECOMENDATIONS    ACTIVITY:    1)  Weight bearing status: __________________________    2)  Keep your surgical lower extremity elevated 23/24 hours above heart level. You will want to keep your foot elevated as much as possible for the first week after surgery.     3)  It is recommended that you get up and move around (walk, crutch, roll) for short periods each hour while you are awake. It is okay to wiggle your toes. If you are not in a splint or cast, move your ankle joint. Motion helps lower risk of a blood clot in your leg.      4)  If you bathe or shower, the dressing must be kept dry. Safety is a concern and sponge bathing might be best. You can purchase a water proof cast protector.     5)  You are not to drive while you are taking pain medications. No driving, if surgery was done on the right foot/ ankle or if you drive a stick shift.     SPECIFIC CARES:    1)  Keep the dressing clean, dry, and intact. If your dressing gets wet, comes apart, or falls off, please call your clinic and notify Dr. Ivan. Some bleeding through the dressing is okay. But if it causes a spot bigger than 2 inches in diameter, please notify Dr. Ivan.     2)  Place an ice pack behind the knee of the surgical side for up to 20min/hour. It can also be placed on " the front of the ankle.     4)  Call your clinic if you experience calf pain, chest pain, or problems breathing.    5)  Call your clinic if fever, increasing pain that you can't control or a large amount of bleeding.      6) What to do if your pain seems out of control:   1)  Make sure you are truly elevating the foot/ankle above heart level.   2)  Make sure you are using a cold pack/ ice   3)  Check the pain medication instructions:     Usually you can take two pain pills every four hours, if needed.   4)  If the above are not adequate, then you need to remove the brace/ boot and   remove the compression wrap. The wrap might be too tight. After you do   this, elevate the foot for an hour and then re-wrap the foot lightly and   replace the splint / boot.      Follow up in clinic one week after surgery. This appointment should already be set up.      MEDICATIONS:    IMPORTANT:  Take your pain medication when you get home, even if you are not having pain. You want it in your system before the local anesthetic (foot numbness from the shot) wears off.      1)  Take your pain medication with food. This will help prevent any nausea side effects.  If hydroxyzine was prescribed, it is for itching, leg spasms, and makes the other pain medication work better.    2)  Anti blood clot plan: To help prevent a blood clot in your legs, it is important that you wiggle your toes and ankles frequently. Obviously, this might be limited on the surgical side. Get up and move around a few minutes every hour while you are awake.  Keep the white sock on the non-surgical side and the compression wraps to the knee on the surgical side. If Dr. Ivan thinks that you are at high risk for a blood clot, he might put you on a blood thinner called enoxaparin.    Please call the clinic if you have any pain or swelling in either calf.        SHOWERING BEFORE SURGERY  You must wash with the soap (Hibiclens - 4% CHG) twice before coming to the hospital  for your surgery. This will decrease bacteria (germs) on your skin. It will also help reduce your chance of infection (illness caused by germs) after surgery.  Read the directions and safety tips on the bottle of soap. Wash once the evening before surgery and once the morning of surgery. Use 4 ounces of soap each time. When showering, it is best to use two fresh washcloths and a fresh towel.   Items you will need for showerin newly washed washcloths   2 newly washed towels   8 ounces of one of the soap  FOLLOW THESE INSTRUCTIONS:  The evening before surgery   1. Shower or bathe as you normally would, use your regular soap and a clean washcloth. Give special attention to places where your incision (surgical cut) or catheters will be. This includes your groin area. Rinse well. You may wash your hair with your regular shampoo.  2. Next, wash your entire body from your chin down with the antiseptic soap. See the next page for directions about how to do this.  3. Rinse well and dry off using a newly washed towel.  The morning of surgery  Repeat steps 1, 2 and 3.   Other suggestions:   Wear freshly washed pajamas or clothing after your evening shower.   Wear freshly washed clothes the day of surgery.   Wash and change your bed sheets the day before surgery to have clean bed sheets after you shower and when you get home from surgery.   If you have trouble washing all areas, make sure someone helps you.   Don t use any deodorant, lotion or powder after your shower.   Women who are menstruating should wear a fresh sanitary pad to the hospital.       **If you have questions or concerns after surgery, please call: Podiatry/Foot & Ankle Surgery Triage Team at the [facility] at [phone]..

## 2024-05-22 NOTE — PROGRESS NOTES
Preoperative Evaluation  [facility]  [address] [facility] [address] [facility] [address]  Phone: [phone]  Primary Provider: [facility]  Pre-op Performing Provider: Jeryr Chandler MD  May 22, 2024       Assessment and plan     1. Preop general physical exam    - CBC with platelets; Future  - EKG 12-lead complete w/read - Clinics  - CBC with platelets    2. Bunion, left foot      3. SOLOMON (dyspnea on exertion)  EKG and chest x-ray reviewed today.  Chest x-ray is clear without any acute findings.  EKG shows:  Sinus Bradycardia -occasional PAC    # PACs = 1.  WITHIN NORMAL LIMITS    Questionable etiology for dyspnea on exertion.  Recommending to proceed with the stress test.  I have recommended to try to get it done before the surgery but if unable to get it scheduled before the surgery, I would like that to be done after surgery but in that case he may not be able to walk on the treadmill as he is having a foot surgery.  Patient understands and agrees with the plan      - NM Lexiscan stress test; Future  - XR Chest 2 Views; Future    4. Hyperlipidemia, unspecified hyperlipidemia type  Well-managed triglycerides are 183 which are higher than normal.  Continue the use of atorvastatin 20 mg daily  - Lipid panel reflex to direct LDL Non-fasting; Future  - Comprehensive metabolic panel; Future  - Lipid panel reflex to direct LDL Non-fasting  - Comprehensive metabolic panel    5. Primary hypertension  Well controlled.  Continue use of amlodipine 10 mg daily and hydrochlorothiazide 25 mg daily    6. Malignant neoplasm of colon, unspecified part of colon (H)  Patient is to follow-up with colorectal surgery.  Referral ordered so he could be scheduled for an appointment  - Adult Colorectal Surgery  Referral; Future      Recommending to proceed with the surgery as planned.  As mentioned above, if the patient is unable to get stress test done  prior to surgery, it is okay to schedule it for later but in that case we would not be able to do an exercise stress test as he is having a foot surgery.  Patient instructed to stop all supplements that he is taking 1 week prior to surgery.        5/20/2024   Surgical Information   What procedure is being done? Foot Surgery - hallus rigidus of left foot    Facility or Hospital where procedure/surgery will be performed: [facility]   Who is doing the procedure / surgery? Dr. Ivan   Date of surgery / procedure: June 4, 2024   Time of surgery / procedure: TBD   Where do you plan to recover after surgery? at home with family     Fax number for surgical facility: Note does not need to be faxed, will be available electronically in Epic.        Paramjit Jesus is a 68 year old, presenting for the following:  Pre-Op Exam          5/22/2024    10:49 AM   Additional Questions   Roomed by Randall RICHARD related to upcoming procedure:         5/20/2024   Pre-Op Questionnaire   Have you ever had a heart attack or stroke? No   Have you ever had surgery on your heart or blood vessels, such as a stent placement, a coronary artery bypass, or surgery on an artery in your head, neck, heart, or legs? No   Do you have chest pain with activity? No   Do you have a history of heart failure? No   Do you currently have a cold, bronchitis or symptoms of other infection? No   Do you have a cough, shortness of breath, or wheezing? No   Do you or anyone in your family have previous history of blood clots? No   Do you or does anyone in your family have a serious bleeding problem such as prolonged bleeding following surgeries or cuts? No   Have you ever had problems with anemia or been told to take iron pills? No   Have you had any abnormal blood loss such as black, tarry or bloody stools? No   Have you ever had a blood transfusion? No   Are you willing to have a blood transfusion if it is medically needed before, during, or after your surgery? Yes    Have you or any of your relatives ever had problems with anesthesia? No   Do you have sleep apnea, excessive snoring or daytime drowsiness? (!) YES   Do you have a CPAP machine? No   Do you have any artifical heart valves or other implanted medical devices like a pacemaker, defibrillator, or continuous glucose monitor? No   Do you have artificial joints? No   Are you allergic to latex? No     Health Care Directive  Patient has a Health Care Directive on file      Preoperative Review of    reviewed - no record of controlled substances prescribed.          Patient Active Problem List    Diagnosis Date Noted    Pancreatic cyst 12/18/2022     Priority: Medium     Dec 2022 - follow up in 6-12 months recommended.        Chronic thoracic back pain, unspecified back pain laterality 08/21/2022     Priority: Medium    Primary hypertension 08/21/2022     Priority: Medium    Hyperlipidemia, unspecified hyperlipidemia type 08/21/2022     Priority: Medium    Low TSH level 08/21/2022     Priority: Medium      Past Medical History:   Diagnosis Date    Colon cancer (H) 3/8/2023    Depressive disorder 1995    Hypertension      Past Surgical History:   Procedure Laterality Date    COLONOSCOPY N/A 01/06/2023    Procedure: COLONOSCOPY, WITH POLYPECTOMY AND BIOPSY;  Surgeon: Liborio Tucker MD;  Location: [facility] OR    COLONOSCOPY      EYE SURGERY      HERNIORRHAPHY UMBILICAL N/A 3/8/2023    Procedure: Herniorrhaphy umbilical Repair;  Surgeon: Nir Escobar MD;  Location: [facility] OR     Current Outpatient Medications   Medication Sig Dispense Refill    acetaminophen (TYLENOL) 500 MG tablet Take 2 tablets (1,000 mg) by mouth every 6 hours 100 tablet 0    atorvastatin (LIPITOR) 20 MG tablet TAKE 1 TABLET (20 MG) BY MOUTH EVERY MORNING +++ APPOINTMENT NEEDED FOR ADDITIONAL REFILLS +++ 30 tablet 0    Bioflavonoid Products (VITAMIN C) CHEW Take by mouth every morning      calcium carbonate-vitamin D (OSCAL) 250-3.125 MG-MCG TABS per  tablet Take 1 tablet by mouth every morning      Moringa Oleifera (MORINGA PO) Take by mouth every morning      Omega-3 Fatty Acids (FISH OIL) 1200 MG capsule Take 1,200 mg by mouth every morning      TURMERIC PO Take by mouth every morning      UNABLE TO FIND every morning MEDICATION NAME: Brian      vitamin B complex with vitamin C (STRESS TAB) tablet Take 1 tablet by mouth every morning      amLODIPine (NORVASC) 10 MG tablet TAKE 1 TABLET (10 MG) BY MOUTH DAILY. 90 tablet 0    hydrochlorothiazide (HYDRODIURIL) 25 MG tablet TAKE 1 TABLET BY MOUTH EVERY DAY 90 tablet 0       Allergies   Allergen Reactions    Other Food Allergy      PN: LW Other1: -lactose intolerant        Social History     Tobacco Use    Smoking status: Former     Current packs/day: 0.00     Types: Cigarettes     Quit date: 2021     Years since quittin.8    Smokeless tobacco: Never   Substance Use Topics    Alcohol use: Yes     Comment: beer per day, on average       History   Drug Use Unknown     Comment: smoking prn             Review of Systems  CONSTITUTIONAL: NEGATIVE for fever, chills, change in weight  INTEGUMENTARY/SKIN: NEGATIVE for worrisome rashes, moles or lesions  EYES: NEGATIVE for vision changes or irritation  ENT/MOUTH: NEGATIVE for ear, mouth and throat problems  RESP: NEGATIVE for significant cough or SOB  BREAST: NEGATIVE for masses, tenderness or discharge  CV: NEGATIVE for chest pain, palpitations or peripheral edema  GI: NEGATIVE for nausea, abdominal pain, heartburn, or change in bowel habits  : NEGATIVE for frequency, dysuria, or hematuria  MUSCULOSKELETAL: NEGATIVE for significant arthralgias or myalgia  NEURO: NEGATIVE for weakness, dizziness or paresthesias  ENDOCRINE: NEGATIVE for temperature intolerance, skin/hair changes  HEME: NEGATIVE for bleeding problems  PSYCHIATRIC: NEGATIVE for changes in mood or affect    Objective    /80   Pulse 61   Temp 97.9  F (36.6  C) (Tympanic)   Resp 15   Wt 99.3 kg  "(219 lb)   SpO2 96%   BMI 29.70 kg/m     Estimated body mass index is 29.7 kg/m  as calculated from the following:    Height as of 5/4/23: 1.829 m (6').    Weight as of this encounter: 99.3 kg (219 lb).  Physical Exam  GENERAL: alert and no distress  EYES: Eyes grossly normal to inspection, PERRL and conjunctivae and sclerae normal  HENT: ear canals and TM's normal, nose and mouth without ulcers or lesions  NECK: no adenopathy, no asymmetry, masses, or scars  RESP: lungs clear to auscultation - no rales, rhonchi or wheezes  CV: regular rate and rhythm, normal S1 S2, no S3 or S4, no murmur, click or rub, no peripheral edema  ABDOMEN: soft, nontender, no hepatosplenomegaly, no masses and bowel sounds normal  MS: no gross musculoskeletal defects noted, no edema  SKIN: no suspicious lesions or rashes  NEURO: Normal strength and tone, mentation intact and speech normal  PSYCH: mentation appears normal, affect normal/bright    No results for input(s): \"HGB\", \"PLT\", \"INR\", \"NA\", \"POTASSIUM\", \"CR\", \"A1C\" in the last 8760 hours.     Diagnostics  Results for orders placed or performed in visit on 05/22/24   XR Chest 2 Views     Status: None    Narrative    CHEST TWO VIEWS  5/22/2024 11:38 AM     HISTORY:  Dyspnea on exertion.    COMPARISON: 10/8/2020.      Impression    IMPRESSION: No significant interval change. Tortuous thoracic aorta.  Heart size and pulmonary vascularity are within normal limits. Lungs  clear. No pleural effusions.    ZEYNEP VALE MD         SYSTEM ID:  [id number]   Results for orders placed or performed in visit on 05/22/24   Lipid panel reflex to direct LDL Non-fasting     Status: Abnormal   Result Value Ref Range    Cholesterol 145 <200 mg/dL    Triglycerides 183 (H) <150 mg/dL    Direct Measure HDL 51 >=40 mg/dL    LDL Cholesterol Calculated 57 <=100 mg/dL    Non HDL Cholesterol 94 <130 mg/dL    Patient Fasting > 8hrs? Unknown     Narrative    Cholesterol  Desirable:  <200 " mg/dL    Triglycerides  Normal:  Less than 150 mg/dL  Borderline High:  150-199 mg/dL  High:  200-499 mg/dL  Very High:  Greater than or equal to 500 mg/dL    Direct Measure HDL  Female:  Greater than or equal to 50 mg/dL   Male:  Greater than or equal to 40 mg/dL    LDL Cholesterol  Desirable:  <100mg/dL  Above Desirable:  100-129 mg/dL   Borderline High:  130-159 mg/dL   High:  160-189 mg/dL   Very High:  >= 190 mg/dL    Non HDL Cholesterol  Desirable:  130 mg/dL  Above Desirable:  130-159 mg/dL  Borderline High:  160-189 mg/dL  High:  190-219 mg/dL  Very High:  Greater than or equal to 220 mg/dL   Comprehensive metabolic panel     Status: None   Result Value Ref Range    Sodium 140 135 - 145 mmol/L    Potassium 3.5 3.4 - 5.3 mmol/L    Carbon Dioxide (CO2) 28 22 - 29 mmol/L    Anion Gap 9 7 - 15 mmol/L    Urea Nitrogen 15.4 8.0 - 23.0 mg/dL    Creatinine 1.17 0.67 - 1.17 mg/dL    GFR Estimate 69 >60 mL/min/1.73m2    Calcium 9.6 8.8 - 10.2 mg/dL    Chloride 103 98 - 107 mmol/L    Glucose 86 70 - 99 mg/dL    Alkaline Phosphatase 72 40 - 150 U/L    AST 28 0 - 45 U/L    ALT 24 0 - 70 U/L    Protein Total 7.1 6.4 - 8.3 g/dL    Albumin 4.3 3.5 - 5.2 g/dL    Bilirubin Total 0.6 <=1.2 mg/dL    Patient Fasting > 8hrs? Unknown    CBC with platelets     Status: Normal   Result Value Ref Range    WBC Count 8.2 4.0 - 11.0 10e3/uL    RBC Count 5.25 4.40 - 5.90 10e6/uL    Hemoglobin 15.3 13.3 - 17.7 g/dL    Hematocrit 45.2 40.0 - 53.0 %    MCV 86 78 - 100 fL    MCH 29.1 26.5 - 33.0 pg    MCHC 33.8 31.5 - 36.5 g/dL    RDW 14.2 10.0 - 15.0 %    Platelet Count 194 150 - 450 10e3/uL            Revised Cardiac Risk Index (RCRI)  The patient has the following serious cardiovascular risks for perioperative complications:   - No serious cardiac risks = 0 points     RCRI Interpretation: 0 points: Class I (very low risk - 0.4% complication rate)         Signed Electronically by: Jerry Chandler MD  Copy of this evaluation report is provided  to requesting physician.

## 2024-05-22 NOTE — PROGRESS NOTES
ASSESSMENT:  Encounter Diagnoses   Name Primary?    Bone spur of left foot Yes    Hallux rigidus of left foot     Left foot pain      MEDICAL DECISION MAKING:  The surgical procedure was discussed in detail, including risks, benefits, post operative course and cares, and prognosis.  Risks discussed include, but are not limited to,  postoperative pain, swelling,  infection, healing complications, temporary or permanent numbness, DVT, hypertropohic scar formation, complex regional pain syndrome and need for additional surgery.       I explained that although infrequent, there are intra-operative and post-operative challenges and complications that can occur.  Some of the latter are listed above.  Intra-operative challenges can involve finding poor bone quality, difficulty with the internal fixation (when used), and even inadvertent injury to neighboring structures that would then need to be repaired.     No guarantees were given.       Disclaimer: This note consists of symbols derived from keyboarding, dictation and/or voice recognition software. As a result, there may be errors in the script that have gone undetected. Please consider this when interpreting information found in this chart.    Doni Ivan DPM, FACFAS, MS    [facility] Department of Podiatry/Foot & Ankle Surgery      ____________________________________________________________________    HPI:       Edin presents today for reevaluation and discussion regarding his upcoming left foot surgical procedure on 6/4/2024: Left foot cheilectomy  No new concerns  Pain is focal involving the bony eminence  *  Past Medical History:   Diagnosis Date    Colon cancer (H) 3/8/2023    Depressive disorder 1995    Hypertension    *  *  Past Surgical History:   Procedure Laterality Date    COLONOSCOPY N/A 01/06/2023    Procedure: COLONOSCOPY, WITH POLYPECTOMY AND BIOPSY;  Surgeon: Liborio Tucker MD;  Location: [facility] OR    COLONOSCOPY      EYE SURGERY       HERNIORRHAPHY UMBILICAL N/A 3/8/2023    Procedure: Herniorrhaphy umbilical Repair;  Surgeon: Nir Escobar MD;  Location: [facility] OR   *  *  Current Outpatient Medications   Medication Sig Dispense Refill    acetaminophen (TYLENOL) 500 MG tablet Take 2 tablets (1,000 mg) by mouth every 6 hours 100 tablet 0    amLODIPine (NORVASC) 10 MG tablet TAKE 1 TABLET (10 MG) BY MOUTH DAILY. 90 tablet 0    atorvastatin (LIPITOR) 20 MG tablet TAKE 1 TABLET (20 MG) BY MOUTH EVERY MORNING +++ APPOINTMENT NEEDED FOR ADDITIONAL REFILLS +++ 30 tablet 0    Bioflavonoid Products (VITAMIN C) CHEW Take by mouth every morning      calcium carbonate-vitamin D (OSCAL) 250-3.125 MG-MCG TABS per tablet Take 1 tablet by mouth every morning      hydrochlorothiazide (HYDRODIURIL) 25 MG tablet TAKE 1 TABLET BY MOUTH EVERY DAY 90 tablet 0    Moringa Oleifera (MORINGA PO) Take by mouth every morning      Omega-3 Fatty Acids (FISH OIL) 1200 MG capsule Take 1,200 mg by mouth every morning      TURMERIC PO Take by mouth every morning      UNABLE TO FIND every morning MEDICATION NAME: Neem      vitamin B complex with vitamin C (STRESS TAB) tablet Take 1 tablet by mouth every morning           EXAM:    Vitals: /80   Wt 99.3 kg (219 lb)   BMI 29.70 kg/m    BMI: Body mass index is 29.7 kg/m .    Vascular:  Pedal pulses are palpable for both the DP and PT arteries.  CFT < 3 sec.  No edema.       Neuro: Light touch sensation is intact to the L4, L5, S1 distributions  No evidence of weakness, spasticity, or contracture in the lower extremities.      Derm: Normal texture and turgor.  No erythema, ecchymosis, or cyanosis.  No open lesions.   There is some hyperkeratotic, macerated skin in the left fourth webspace.  No erythema.  No wound.     Musculoskeletal:    Lower extremity muscle strength is normal.  Pes planus.  Abduction of the hallux which contacts the second toe.  Rigid left first metatarsophalangeal joint.  Prominent dorsal medial bony  eminence at this level.     EXAM: XR FOOT LEFT G/E 3 VIEWS  LOCATION: [facility]  DATE: 12/8/2023     INDICATION: Left first toe pain.  COMPARISON: None.                                                                      IMPRESSION:  1.  Advanced left first MTP hypertrophic degenerative arthrosis.  2.  No fracture.  3.  Second hammertoe deformity.  4.  Linear metallic body projecting at the plantar margin of the first toe proximal phalanx neck.

## 2024-05-22 NOTE — LETTER
5/22/2024         RE: Edin Robles  [address] [place] [id number]        Dear Colleague,    Thank you for referring your patient, Edin Robles, to the [facility]. Please see a copy of my visit note below.    ASSESSMENT:  Encounter Diagnoses   Name Primary?     Bone spur of left foot Yes     Hallux rigidus of left foot      Left foot pain      MEDICAL DECISION MAKING:  The surgical procedure was discussed in detail, including risks, benefits, post operative course and cares, and prognosis.  Risks discussed include, but are not limited to,  postoperative pain, swelling,  infection, healing complications, temporary or permanent numbness, DVT, hypertropohic scar formation, complex regional pain syndrome and need for additional surgery.       I explained that although infrequent, there are intra-operative and post-operative challenges and complications that can occur.  Some of the latter are listed above.  Intra-operative challenges can involve finding poor bone quality, difficulty with the internal fixation (when used), and even inadvertent injury to neighboring structures that would then need to be repaired.     No guarantees were given.       Disclaimer: This note consists of symbols derived from keyboarding, dictation and/or voice recognition software. As a result, there may be errors in the script that have gone undetected. Please consider this when interpreting information found in this chart.    Doni Ivan DPM, FACFAS, [facility] Department of Podiatry/Foot & Ankle Surgery      ____________________________________________________________________    HPI:       Edin presents today for reevaluation and discussion regarding his upcoming left foot surgical procedure on 6/4/2024: Left foot cheilectomy  No new concerns  Pain is focal involving the bony eminence  *  Past Medical History:   Diagnosis Date     Colon cancer (H) 3/8/2023     Depressive disorder  1995     Hypertension    *  *  Past Surgical History:   Procedure Laterality Date     COLONOSCOPY N/A 01/06/2023    Procedure: COLONOSCOPY, WITH POLYPECTOMY AND BIOPSY;  Surgeon: Liborio Tucker MD;  Location: [facility] OR     COLONOSCOPY       EYE SURGERY       HERNIORRHAPHY UMBILICAL N/A 3/8/2023    Procedure: Herniorrhaphy umbilical Repair;  Surgeon: Nir Escobar MD;  Location: [facility] OR   *  *  Current Outpatient Medications   Medication Sig Dispense Refill     acetaminophen (TYLENOL) 500 MG tablet Take 2 tablets (1,000 mg) by mouth every 6 hours 100 tablet 0     amLODIPine (NORVASC) 10 MG tablet TAKE 1 TABLET (10 MG) BY MOUTH DAILY. 90 tablet 0     atorvastatin (LIPITOR) 20 MG tablet TAKE 1 TABLET (20 MG) BY MOUTH EVERY MORNING +++ APPOINTMENT NEEDED FOR ADDITIONAL REFILLS +++ 30 tablet 0     Bioflavonoid Products (VITAMIN C) CHEW Take by mouth every morning       calcium carbonate-vitamin D (OSCAL) 250-3.125 MG-MCG TABS per tablet Take 1 tablet by mouth every morning       hydrochlorothiazide (HYDRODIURIL) 25 MG tablet TAKE 1 TABLET BY MOUTH EVERY DAY 90 tablet 0     Moringa Oleifera (MORINGA PO) Take by mouth every morning       Omega-3 Fatty Acids (FISH OIL) 1200 MG capsule Take 1,200 mg by mouth every morning       TURMERIC PO Take by mouth every morning       UNABLE TO FIND every morning MEDICATION NAME: Neem       vitamin B complex with vitamin C (STRESS TAB) tablet Take 1 tablet by mouth every morning           EXAM:    Vitals: /80   Wt 99.3 kg (219 lb)   BMI 29.70 kg/m    BMI: Body mass index is 29.7 kg/m .    Vascular:  Pedal pulses are palpable for both the DP and PT arteries.  CFT < 3 sec.  No edema.       Neuro: Light touch sensation is intact to the L4, L5, S1 distributions  No evidence of weakness, spasticity, or contracture in the lower extremities.      Derm: Normal texture and turgor.  No erythema, ecchymosis, or cyanosis.  No open lesions.   There is some hyperkeratotic, macerated skin  in the left fourth webspace.  No erythema.  No wound.     Musculoskeletal:    Lower extremity muscle strength is normal.  Pes planus.  Abduction of the hallux which contacts the second toe.  Rigid left first metatarsophalangeal joint.  Prominent dorsal medial bony eminence at this level.     EXAM: XR FOOT LEFT G/E 3 VIEWS  LOCATION: [facility]  DATE: 12/8/2023     INDICATION: Left first toe pain.  COMPARISON: None.                                                                      IMPRESSION:  1.  Advanced left first MTP hypertrophic degenerative arthrosis.  2.  No fracture.  3.  Second hammertoe deformity.  4.  Linear metallic body projecting at the plantar margin of the first toe proximal phalanx neck.      Again, thank you for allowing me to participate in the care of your patient.        Sincerely,        Doni Ivan DPM

## 2024-05-23 LAB
ALBUMIN SERPL BCG-MCNC: 4.3 G/DL (ref 3.5–5.2)
ALP SERPL-CCNC: 72 U/L (ref 40–150)
ALT SERPL W P-5'-P-CCNC: 24 U/L (ref 0–70)
ANION GAP SERPL CALCULATED.3IONS-SCNC: 9 MMOL/L (ref 7–15)
AST SERPL W P-5'-P-CCNC: 28 U/L (ref 0–45)
BILIRUB SERPL-MCNC: 0.6 MG/DL
BUN SERPL-MCNC: 15.4 MG/DL (ref 8–23)
CALCIUM SERPL-MCNC: 9.6 MG/DL (ref 8.8–10.2)
CHLORIDE SERPL-SCNC: 103 MMOL/L (ref 98–107)
CHOLEST SERPL-MCNC: 145 MG/DL
CREAT SERPL-MCNC: 1.17 MG/DL (ref 0.67–1.17)
DEPRECATED HCO3 PLAS-SCNC: 28 MMOL/L (ref 22–29)
EGFRCR SERPLBLD CKD-EPI 2021: 69 ML/MIN/1.73M2
FASTING STATUS PATIENT QL REPORTED: ABNORMAL
FASTING STATUS PATIENT QL REPORTED: NORMAL
GLUCOSE SERPL-MCNC: 86 MG/DL (ref 70–99)
HDLC SERPL-MCNC: 51 MG/DL
LDLC SERPL CALC-MCNC: 57 MG/DL
NONHDLC SERPL-MCNC: 94 MG/DL
POTASSIUM SERPL-SCNC: 3.5 MMOL/L (ref 3.4–5.3)
PROT SERPL-MCNC: 7.1 G/DL (ref 6.4–8.3)
SODIUM SERPL-SCNC: 140 MMOL/L (ref 135–145)
TRIGL SERPL-MCNC: 183 MG/DL

## 2024-05-24 NOTE — RESULT ENCOUNTER NOTE
I ordered a stress test for the patient to be done stat.  I was hoping that it could be done prior to this upcoming surgical procedure.  I do not see that it is scheduled as of yet.  Please follow-up on that    Jerry Chandler MD  [facility], [place] [place]

## 2024-05-29 DIAGNOSIS — I73.00 RAYNAUD'S DISEASE WITHOUT GANGRENE: ICD-10-CM

## 2024-05-29 DIAGNOSIS — I10 PRIMARY HYPERTENSION: ICD-10-CM

## 2024-05-29 RX ORDER — HYDROCHLOROTHIAZIDE 25 MG/1
TABLET ORAL
Qty: 90 TABLET | Refills: 0 | Status: SHIPPED | OUTPATIENT
Start: 2024-05-29 | End: 2024-09-02

## 2024-05-29 RX ORDER — AMLODIPINE BESYLATE 10 MG/1
10 TABLET ORAL DAILY
Qty: 90 TABLET | Refills: 0 | Status: SHIPPED | OUTPATIENT
Start: 2024-05-29 | End: 2024-09-02

## 2024-05-31 ENCOUNTER — MYC MEDICAL ADVICE (OUTPATIENT)
Dept: FAMILY MEDICINE | Facility: CLINIC | Age: 69
End: 2024-05-31
Payer: COMMERCIAL

## 2024-05-31 NOTE — TELEPHONE ENCOUNTER
Patient will call cardiology to schedule his stress test.  Phone number given to the patient .       Danita Marrufo RN  [facility]

## 2024-06-02 DIAGNOSIS — E78.5 HYPERLIPIDEMIA, UNSPECIFIED HYPERLIPIDEMIA TYPE: ICD-10-CM

## 2024-06-03 ENCOUNTER — HOSPITAL ENCOUNTER (OUTPATIENT)
Dept: NUCLEAR MEDICINE | Facility: CLINIC | Age: 69
Setting detail: NUCLEAR MEDICINE
Discharge: HOME | End: 2024-06-03
Attending: FAMILY MEDICINE
Payer: COMMERCIAL

## 2024-06-03 ENCOUNTER — HOSPITAL ENCOUNTER (OUTPATIENT)
Dept: CARDIOLOGY | Facility: CLINIC | Age: 69
Discharge: HOME | End: 2024-06-03
Attending: FAMILY MEDICINE
Payer: COMMERCIAL

## 2024-06-03 ENCOUNTER — ANESTHESIA EVENT (OUTPATIENT)
Dept: SURGERY | Facility: CLINIC | Age: 69
End: 2024-06-03
Payer: COMMERCIAL

## 2024-06-03 DIAGNOSIS — R06.09 DOE (DYSPNEA ON EXERTION): ICD-10-CM

## 2024-06-03 LAB
CV STRESS MAX HR HE: 91
RATE PRESSURE PRODUCT: NORMAL
STRESS ECHO BASELINE DIASTOLIC HE: 105
STRESS ECHO BASELINE HR: 55 BPM
STRESS ECHO BASELINE SYSTOLIC BP: 150
STRESS ECHO CALCULATED PERCENT HR: 59 %
STRESS ECHO LAST STRESS DIASTOLIC BP: 109
STRESS ECHO LAST STRESS SYSTOLIC BP: 151
STRESS ECHO TARGET HR: 154

## 2024-06-03 PROCEDURE — 78452 HT MUSCLE IMAGE SPECT MULT: CPT | Mod: 26 | Performed by: INTERNAL MEDICINE

## 2024-06-03 PROCEDURE — A9502 TC99M TETROFOSMIN: HCPCS | Performed by: FAMILY MEDICINE

## 2024-06-03 PROCEDURE — 93018 CV STRESS TEST I&R ONLY: CPT | Performed by: INTERNAL MEDICINE

## 2024-06-03 PROCEDURE — 93016 CV STRESS TEST SUPVJ ONLY: CPT | Performed by: INTERNAL MEDICINE

## 2024-06-03 PROCEDURE — 78452 HT MUSCLE IMAGE SPECT MULT: CPT

## 2024-06-03 PROCEDURE — 343N000001 HC RX 343: Performed by: FAMILY MEDICINE

## 2024-06-03 PROCEDURE — 250N000011 HC RX IP 250 OP 636: Performed by: INTERNAL MEDICINE

## 2024-06-03 PROCEDURE — 93017 CV STRESS TEST TRACING ONLY: CPT

## 2024-06-03 RX ORDER — ACYCLOVIR 200 MG/1
0-1 CAPSULE ORAL
Status: DISCONTINUED | OUTPATIENT
Start: 2024-06-03 | End: 2024-06-04 | Stop reason: HOSPADM

## 2024-06-03 RX ORDER — ATORVASTATIN CALCIUM 20 MG/1
20 TABLET, FILM COATED ORAL EVERY MORNING
Qty: 30 TABLET | Refills: 0 | Status: SHIPPED | OUTPATIENT
Start: 2024-06-03 | End: 2024-06-28

## 2024-06-03 RX ORDER — ALBUTEROL SULFATE 90 UG/1
2 AEROSOL, METERED RESPIRATORY (INHALATION) EVERY 5 MIN PRN
Status: DISCONTINUED | OUTPATIENT
Start: 2024-06-03 | End: 2024-06-04 | Stop reason: HOSPADM

## 2024-06-03 RX ORDER — CAFFEINE CITRATE 20 MG/ML
60 SOLUTION INTRAVENOUS
Status: DISCONTINUED | OUTPATIENT
Start: 2024-06-03 | End: 2024-06-04 | Stop reason: HOSPADM

## 2024-06-03 RX ORDER — REGADENOSON 0.08 MG/ML
0.4 INJECTION, SOLUTION INTRAVENOUS ONCE
Status: COMPLETED | OUTPATIENT
Start: 2024-06-03 | End: 2024-06-03

## 2024-06-03 RX ORDER — AMINOPHYLLINE 25 MG/ML
50-100 INJECTION, SOLUTION INTRAVENOUS
Status: DISCONTINUED | OUTPATIENT
Start: 2024-06-03 | End: 2024-06-04 | Stop reason: HOSPADM

## 2024-06-03 RX ADMIN — REGADENOSON 0.4 MG: 0.08 INJECTION, SOLUTION INTRAVENOUS at 09:10

## 2024-06-03 RX ADMIN — TETROFOSMIN 10.2 MILLICURIE: 1.38 INJECTION, POWDER, LYOPHILIZED, FOR SOLUTION INTRAVENOUS at 08:09

## 2024-06-03 RX ADMIN — TETROFOSMIN 38.8 MILLICURIE: 1.38 INJECTION, POWDER, LYOPHILIZED, FOR SOLUTION INTRAVENOUS at 09:10

## 2024-06-04 ENCOUNTER — ANESTHESIA (OUTPATIENT)
Dept: SURGERY | Facility: CLINIC | Age: 69
End: 2024-06-04
Payer: COMMERCIAL

## 2024-06-04 ENCOUNTER — APPOINTMENT (OUTPATIENT)
Dept: GENERAL RADIOLOGY | Facility: CLINIC | Age: 69
End: 2024-06-04
Attending: PODIATRIST
Payer: COMMERCIAL

## 2024-06-04 ENCOUNTER — HOSPITAL ENCOUNTER (OUTPATIENT)
Facility: CLINIC | Age: 69
Discharge: HOME | End: 2024-06-04
Attending: PODIATRIST | Admitting: PODIATRIST
Payer: COMMERCIAL

## 2024-06-04 VITALS
WEIGHT: 219 LBS | BODY MASS INDEX: 29.7 KG/M2 | HEART RATE: 55 BPM | DIASTOLIC BLOOD PRESSURE: 98 MMHG | OXYGEN SATURATION: 96 % | TEMPERATURE: 97 F | RESPIRATION RATE: 14 BRPM | SYSTOLIC BLOOD PRESSURE: 149 MMHG

## 2024-06-04 DIAGNOSIS — G89.18 POST-OPERATIVE PAIN: Primary | ICD-10-CM

## 2024-06-04 DIAGNOSIS — M77.52 BONE SPUR OF LEFT FOOT: ICD-10-CM

## 2024-06-04 DIAGNOSIS — L98.8 SKIN MACERATION: ICD-10-CM

## 2024-06-04 DIAGNOSIS — M20.22 HALLUX RIGIDUS OF LEFT FOOT: ICD-10-CM

## 2024-06-04 PROCEDURE — 999N000179 XR SURGERY CARM FLUORO LESS THAN 5 MIN W STILLS

## 2024-06-04 PROCEDURE — 28289 CORRJ HALUX RIGDUS W/O IMPLT: CPT | Performed by: NURSE ANESTHETIST, CERTIFIED REGISTERED

## 2024-06-04 PROCEDURE — 999N000141 HC STATISTIC PRE-PROCEDURE NURSING ASSESSMENT: Performed by: PODIATRIST

## 2024-06-04 PROCEDURE — 28289 CORRJ HALUX RIGDUS W/O IMPLT: CPT | Performed by: ANESTHESIOLOGY

## 2024-06-04 PROCEDURE — 258N000003 HC RX IP 258 OP 636: Performed by: NURSE ANESTHETIST, CERTIFIED REGISTERED

## 2024-06-04 PROCEDURE — 250N000009 HC RX 250: Performed by: PODIATRIST

## 2024-06-04 PROCEDURE — 250N000011 HC RX IP 250 OP 636: Performed by: PODIATRIST

## 2024-06-04 PROCEDURE — 999N000065 XR FOOT PORT LEFT 3 VIEWS: Mod: LT

## 2024-06-04 PROCEDURE — 28289 CORRJ HALUX RIGDUS W/O IMPLT: CPT | Mod: LT | Performed by: PODIATRIST

## 2024-06-04 PROCEDURE — 272N000001 HC OR GENERAL SUPPLY STERILE: Performed by: PODIATRIST

## 2024-06-04 PROCEDURE — 360N000076 HC SURGERY LEVEL 3, PER MIN: Performed by: PODIATRIST

## 2024-06-04 PROCEDURE — 710N000012 HC RECOVERY PHASE 2, PER MINUTE: Performed by: PODIATRIST

## 2024-06-04 PROCEDURE — L4361 PNEUMA/VAC WALK BOOT PRE OTS: HCPCS

## 2024-06-04 PROCEDURE — 370N000017 HC ANESTHESIA TECHNICAL FEE, PER MIN: Performed by: PODIATRIST

## 2024-06-04 PROCEDURE — 250N000011 HC RX IP 250 OP 636: Performed by: NURSE ANESTHETIST, CERTIFIED REGISTERED

## 2024-06-04 PROCEDURE — 250N000009 HC RX 250: Performed by: NURSE ANESTHETIST, CERTIFIED REGISTERED

## 2024-06-04 PROCEDURE — 710N000009 HC RECOVERY PHASE 1, LEVEL 1, PER MIN: Performed by: PODIATRIST

## 2024-06-04 PROCEDURE — 250N000025 HC SEVOFLURANE, PER MIN: Performed by: PODIATRIST

## 2024-06-04 RX ORDER — FENTANYL CITRATE 50 UG/ML
25 INJECTION, SOLUTION INTRAMUSCULAR; INTRAVENOUS EVERY 5 MIN PRN
Status: DISCONTINUED | OUTPATIENT
Start: 2024-06-04 | End: 2024-06-04 | Stop reason: HOSPADM

## 2024-06-04 RX ORDER — DEXAMETHASONE SODIUM PHOSPHATE 4 MG/ML
4 INJECTION, SOLUTION INTRA-ARTICULAR; INTRALESIONAL; INTRAMUSCULAR; INTRAVENOUS; SOFT TISSUE
Status: DISCONTINUED | OUTPATIENT
Start: 2024-06-04 | End: 2024-06-04 | Stop reason: HOSPADM

## 2024-06-04 RX ORDER — ONDANSETRON 4 MG/1
4 TABLET, ORALLY DISINTEGRATING ORAL EVERY 30 MIN PRN
Status: CANCELLED | OUTPATIENT
Start: 2024-06-04

## 2024-06-04 RX ORDER — FENTANYL CITRATE 0.05 MG/ML
25 INJECTION, SOLUTION INTRAMUSCULAR; INTRAVENOUS
Status: CANCELLED | OUTPATIENT
Start: 2024-06-04

## 2024-06-04 RX ORDER — OXYCODONE HYDROCHLORIDE 5 MG/1
10 TABLET ORAL
Status: CANCELLED | OUTPATIENT
Start: 2024-06-04

## 2024-06-04 RX ORDER — OXYCODONE HYDROCHLORIDE 5 MG/1
5 TABLET ORAL
Status: CANCELLED | OUTPATIENT
Start: 2024-06-04

## 2024-06-04 RX ORDER — SODIUM CHLORIDE, SODIUM LACTATE, POTASSIUM CHLORIDE, CALCIUM CHLORIDE 600; 310; 30; 20 MG/100ML; MG/100ML; MG/100ML; MG/100ML
INJECTION, SOLUTION INTRAVENOUS CONTINUOUS PRN
Status: DISCONTINUED | OUTPATIENT
Start: 2024-06-04 | End: 2024-06-04

## 2024-06-04 RX ORDER — LABETALOL HYDROCHLORIDE 5 MG/ML
10 INJECTION, SOLUTION INTRAVENOUS
Status: DISCONTINUED | OUTPATIENT
Start: 2024-06-04 | End: 2024-06-04 | Stop reason: HOSPADM

## 2024-06-04 RX ORDER — CEFAZOLIN SODIUM/WATER 2 G/20 ML
2 SYRINGE (ML) INTRAVENOUS
Status: COMPLETED | OUTPATIENT
Start: 2024-06-04 | End: 2024-06-04

## 2024-06-04 RX ORDER — IBUPROFEN 600 MG/1
600 TABLET, FILM COATED ORAL EVERY 6 HOURS PRN
Qty: 28 TABLET | Refills: 1 | Status: SHIPPED | OUTPATIENT
Start: 2024-06-04

## 2024-06-04 RX ORDER — MAGNESIUM HYDROXIDE 1200 MG/15ML
LIQUID ORAL PRN
Status: DISCONTINUED | OUTPATIENT
Start: 2024-06-04 | End: 2024-06-04 | Stop reason: HOSPADM

## 2024-06-04 RX ORDER — BUPIVACAINE HYDROCHLORIDE 5 MG/ML
INJECTION, SOLUTION PERINEURAL PRN
Status: DISCONTINUED | OUTPATIENT
Start: 2024-06-04 | End: 2024-06-04 | Stop reason: HOSPADM

## 2024-06-04 RX ORDER — HYDROMORPHONE HCL IN WATER/PF 6 MG/30 ML
0.4 PATIENT CONTROLLED ANALGESIA SYRINGE INTRAVENOUS EVERY 5 MIN PRN
Status: DISCONTINUED | OUTPATIENT
Start: 2024-06-04 | End: 2024-06-04 | Stop reason: HOSPADM

## 2024-06-04 RX ORDER — ONDANSETRON 2 MG/ML
4 INJECTION INTRAMUSCULAR; INTRAVENOUS EVERY 30 MIN PRN
Status: CANCELLED | OUTPATIENT
Start: 2024-06-04

## 2024-06-04 RX ORDER — LIDOCAINE HYDROCHLORIDE 20 MG/ML
INJECTION, SOLUTION INFILTRATION; PERINEURAL PRN
Status: DISCONTINUED | OUTPATIENT
Start: 2024-06-04 | End: 2024-06-04 | Stop reason: HOSPADM

## 2024-06-04 RX ORDER — ONDANSETRON 2 MG/ML
INJECTION INTRAMUSCULAR; INTRAVENOUS PRN
Status: DISCONTINUED | OUTPATIENT
Start: 2024-06-04 | End: 2024-06-04

## 2024-06-04 RX ORDER — HYDRALAZINE HYDROCHLORIDE 20 MG/ML
2.5-5 INJECTION INTRAMUSCULAR; INTRAVENOUS EVERY 10 MIN PRN
Status: DISCONTINUED | OUTPATIENT
Start: 2024-06-04 | End: 2024-06-04 | Stop reason: HOSPADM

## 2024-06-04 RX ORDER — NALOXONE HYDROCHLORIDE 0.4 MG/ML
0.1 INJECTION, SOLUTION INTRAMUSCULAR; INTRAVENOUS; SUBCUTANEOUS
Status: DISCONTINUED | OUTPATIENT
Start: 2024-06-04 | End: 2024-06-04 | Stop reason: HOSPADM

## 2024-06-04 RX ORDER — DEXAMETHASONE SODIUM PHOSPHATE 4 MG/ML
4 INJECTION, SOLUTION INTRA-ARTICULAR; INTRALESIONAL; INTRAMUSCULAR; INTRAVENOUS; SOFT TISSUE
Status: CANCELLED | OUTPATIENT
Start: 2024-06-04

## 2024-06-04 RX ORDER — ONDANSETRON 2 MG/ML
4 INJECTION INTRAMUSCULAR; INTRAVENOUS EVERY 30 MIN PRN
Status: DISCONTINUED | OUTPATIENT
Start: 2024-06-04 | End: 2024-06-04 | Stop reason: HOSPADM

## 2024-06-04 RX ORDER — ALBUTEROL SULFATE 0.83 MG/ML
2.5 SOLUTION RESPIRATORY (INHALATION) EVERY 4 HOURS PRN
Status: DISCONTINUED | OUTPATIENT
Start: 2024-06-04 | End: 2024-06-04 | Stop reason: HOSPADM

## 2024-06-04 RX ORDER — FENTANYL CITRATE 50 UG/ML
50 INJECTION, SOLUTION INTRAMUSCULAR; INTRAVENOUS EVERY 5 MIN PRN
Status: DISCONTINUED | OUTPATIENT
Start: 2024-06-04 | End: 2024-06-04 | Stop reason: HOSPADM

## 2024-06-04 RX ORDER — CEFAZOLIN SODIUM/WATER 2 G/20 ML
2 SYRINGE (ML) INTRAVENOUS SEE ADMIN INSTRUCTIONS
Status: DISCONTINUED | OUTPATIENT
Start: 2024-06-04 | End: 2024-06-04 | Stop reason: HOSPADM

## 2024-06-04 RX ORDER — LIDOCAINE HYDROCHLORIDE 20 MG/ML
INJECTION, SOLUTION INFILTRATION; PERINEURAL PRN
Status: DISCONTINUED | OUTPATIENT
Start: 2024-06-04 | End: 2024-06-04

## 2024-06-04 RX ORDER — HYDROMORPHONE HCL IN WATER/PF 6 MG/30 ML
0.2 PATIENT CONTROLLED ANALGESIA SYRINGE INTRAVENOUS EVERY 5 MIN PRN
Status: DISCONTINUED | OUTPATIENT
Start: 2024-06-04 | End: 2024-06-04 | Stop reason: HOSPADM

## 2024-06-04 RX ORDER — ACETAMINOPHEN 500 MG
500-1000 TABLET ORAL EVERY 8 HOURS PRN
Qty: 42 TABLET | Refills: 0 | Status: SHIPPED | OUTPATIENT
Start: 2024-06-04

## 2024-06-04 RX ORDER — NALOXONE HYDROCHLORIDE 0.4 MG/ML
0.1 INJECTION, SOLUTION INTRAMUSCULAR; INTRAVENOUS; SUBCUTANEOUS
Status: CANCELLED | OUTPATIENT
Start: 2024-06-04

## 2024-06-04 RX ORDER — SODIUM CHLORIDE, SODIUM LACTATE, POTASSIUM CHLORIDE, CALCIUM CHLORIDE 600; 310; 30; 20 MG/100ML; MG/100ML; MG/100ML; MG/100ML
INJECTION, SOLUTION INTRAVENOUS CONTINUOUS
Status: DISCONTINUED | OUTPATIENT
Start: 2024-06-04 | End: 2024-06-04 | Stop reason: HOSPADM

## 2024-06-04 RX ORDER — FENTANYL CITRATE 50 UG/ML
INJECTION, SOLUTION INTRAMUSCULAR; INTRAVENOUS PRN
Status: DISCONTINUED | OUTPATIENT
Start: 2024-06-04 | End: 2024-06-04

## 2024-06-04 RX ORDER — OXYCODONE HYDROCHLORIDE 5 MG/1
5 TABLET ORAL EVERY 6 HOURS PRN
Qty: 16 TABLET | Refills: 0 | Status: SHIPPED | OUTPATIENT
Start: 2024-06-04 | End: 2024-06-08

## 2024-06-04 RX ORDER — MEPERIDINE HYDROCHLORIDE 25 MG/ML
12.5 INJECTION INTRAMUSCULAR; INTRAVENOUS; SUBCUTANEOUS EVERY 5 MIN PRN
Status: DISCONTINUED | OUTPATIENT
Start: 2024-06-04 | End: 2024-06-04 | Stop reason: HOSPADM

## 2024-06-04 RX ORDER — ONDANSETRON 4 MG/1
4 TABLET, ORALLY DISINTEGRATING ORAL EVERY 30 MIN PRN
Status: DISCONTINUED | OUTPATIENT
Start: 2024-06-04 | End: 2024-06-04 | Stop reason: HOSPADM

## 2024-06-04 RX ORDER — DEXAMETHASONE SODIUM PHOSPHATE 4 MG/ML
INJECTION, SOLUTION INTRA-ARTICULAR; INTRALESIONAL; INTRAMUSCULAR; INTRAVENOUS; SOFT TISSUE PRN
Status: DISCONTINUED | OUTPATIENT
Start: 2024-06-04 | End: 2024-06-04

## 2024-06-04 RX ORDER — PROPOFOL 10 MG/ML
INJECTION, EMULSION INTRAVENOUS CONTINUOUS PRN
Status: DISCONTINUED | OUTPATIENT
Start: 2024-06-04 | End: 2024-06-04

## 2024-06-04 RX ADMIN — PROPOFOL 70 MG: 10 INJECTION, EMULSION INTRAVENOUS at 07:53

## 2024-06-04 RX ADMIN — PROPOFOL 75 MCG/KG/MIN: 10 INJECTION, EMULSION INTRAVENOUS at 07:29

## 2024-06-04 RX ADMIN — Medication 2 G: at 07:27

## 2024-06-04 RX ADMIN — LIDOCAINE HYDROCHLORIDE 60 MG: 20 INJECTION, SOLUTION INFILTRATION; PERINEURAL at 07:29

## 2024-06-04 RX ADMIN — SODIUM CHLORIDE, POTASSIUM CHLORIDE, SODIUM LACTATE AND CALCIUM CHLORIDE: 600; 310; 30; 20 INJECTION, SOLUTION INTRAVENOUS at 07:27

## 2024-06-04 RX ADMIN — FENTANYL CITRATE 25 MCG: 50 INJECTION INTRAMUSCULAR; INTRAVENOUS at 07:51

## 2024-06-04 RX ADMIN — MIDAZOLAM 2 MG: 1 INJECTION INTRAMUSCULAR; INTRAVENOUS at 07:28

## 2024-06-04 RX ADMIN — DEXAMETHASONE SODIUM PHOSPHATE 4 MG: 4 INJECTION, SOLUTION INTRA-ARTICULAR; INTRALESIONAL; INTRAMUSCULAR; INTRAVENOUS; SOFT TISSUE at 07:36

## 2024-06-04 RX ADMIN — PHENYLEPHRINE HYDROCHLORIDE 100 MCG: 10 INJECTION INTRAVENOUS at 08:15

## 2024-06-04 RX ADMIN — FENTANYL CITRATE 50 MCG: 50 INJECTION INTRAMUSCULAR; INTRAVENOUS at 07:29

## 2024-06-04 RX ADMIN — ONDANSETRON 4 MG: 2 INJECTION INTRAMUSCULAR; INTRAVENOUS at 07:36

## 2024-06-04 RX ADMIN — PROPOFOL 30 MG: 10 INJECTION, EMULSION INTRAVENOUS at 07:31

## 2024-06-04 RX ADMIN — FENTANYL CITRATE 25 MCG: 50 INJECTION INTRAMUSCULAR; INTRAVENOUS at 07:50

## 2024-06-04 ASSESSMENT — ACTIVITIES OF DAILY LIVING (ADL)
ADLS_ACUITY_SCORE: 20

## 2024-06-04 NOTE — ANESTHESIA PROCEDURE NOTES
Airway       Patient location during procedure: OR  Staff -        Anesthesiologist:  Beba Patel MD       CRNA: Fadia Krueger APRN CRNA       Performed By: CRNA  Consent for Airway        Urgency: elective  Indications and Patient Condition       Indications for airway management: hu-procedural       Induction type:intravenous       Mask difficulty assessment: 0 - not attempted    Final Airway Details       Final airway type: supraglottic airway    Supraglottic Airway Details        Type: LMA       Brand: I-Gel       LMA size: 5    Post intubation assessment        Placement verified by: capnometry, equal breath sounds and chest rise        Number of attempts at approach: 1       Number of other approaches attempted: 0       Secured with: tape       Ease of procedure: easy       Dentition: Intact and Unchanged

## 2024-06-04 NOTE — ANESTHESIA POSTPROCEDURE EVALUATION
Patient: Edin Robles    Procedure: Procedure(s):  CHEILECTOMY/ bone spur removal and synovectomy       Anesthesia Type:  MAC    Note:     Postop Pain Control: Uneventful            Sign Out: Well controlled pain   PONV: No   Neuro/Psych: Uneventful            Sign Out: Acceptable/Baseline neuro status   Airway/Respiratory: Uneventful            Sign Out: Acceptable/Baseline resp. status   CV/Hemodynamics: Uneventful            Sign Out: Acceptable CV status; No obvious hypovolemia; No obvious fluid overload   Other NRE:    DID A NON-ROUTINE EVENT OCCUR? No           Last vitals:  Vitals Value Taken Time   /101 06/04/24 0945   Temp 36.3  C (97.3  F) 06/04/24 0945   Pulse 54 06/04/24 0949   Resp 16 06/04/24 0949   SpO2 95 % 06/04/24 0949   Vitals shown include unfiled device data.    Electronically Signed By: Beba Patel MD, MD  June 4, 2024  9:52 AM

## 2024-06-04 NOTE — ANESTHESIA PREPROCEDURE EVALUATION
Anesthesia Pre-Procedure Evaluation    Patient: Edin Robles   MRN: [id number] : 1955        Procedure : Procedure(s):  CHEILECTOMY/ bone spur removal  ARTHROPLASTY, left fifth toe          Past Medical History:   Diagnosis Date    Colon cancer (H) 3/8/2023    Depressive disorder     Hypertension       Past Surgical History:   Procedure Laterality Date    COLONOSCOPY N/A 2023    Procedure: COLONOSCOPY, WITH POLYPECTOMY AND BIOPSY;  Surgeon: Liborio Tucker MD;  Location: [facility] OR    COLONOSCOPY      EYE SURGERY      HERNIORRHAPHY UMBILICAL N/A 3/8/2023    Procedure: Herniorrhaphy umbilical Repair;  Surgeon: Nir Escobar MD;  Location: [facility] OR      Allergies   Allergen Reactions    Other Food Allergy      PN: LW Other1: -lactose intolerant      Social History     Tobacco Use    Smoking status: Former     Current packs/day: 0.00     Types: Cigarettes     Quit date: 2021     Years since quittin.8    Smokeless tobacco: Never   Substance Use Topics    Alcohol use: Yes     Comment: beer per day, on average      Wt Readings from Last 1 Encounters:   24 99.3 kg (219 lb)        Anesthesia Evaluation   Pt has had prior anesthetic.     No history of anesthetic complications       ROS/MED HX  ENT/Pulmonary:    (-) sleep apnea   Neurologic:    (-) no CVA   Cardiovascular:     (+)  hypertension- -   -  - -                                   (-) CAD   METS/Exercise Tolerance:     Hematologic:       Musculoskeletal:       GI/Hepatic:    (-) GERD   Renal/Genitourinary:       Endo:    (-) Type II DM   Psychiatric/Substance Use:     (+) psychiatric history depression       Infectious Disease:       Malignancy:   (+) Malignancy, History of GI.    Other:            Physical Exam    Airway        Mallampati: II   TM distance: > 3 FB   Neck ROM: full   Mouth opening: > 3 cm    Respiratory Devices and Support         Dental  no notable dental history     (+) Minor Abnormalities - some fillings,  "tiny chips      Cardiovascular   cardiovascular exam normal          Pulmonary   pulmonary exam normal                OUTSIDE LABS:  CBC:   Lab Results   Component Value Date    WBC 8.2 05/22/2024    WBC 15.3 (H) 03/09/2023    HGB 15.3 05/22/2024    HGB 14.7 03/09/2023    HCT 45.2 05/22/2024    HCT 45.5 03/09/2023     05/22/2024     03/11/2023     BMP:   Lab Results   Component Value Date     05/22/2024     03/09/2023    POTASSIUM 3.5 05/22/2024    POTASSIUM 3.7 03/09/2023    CHLORIDE 103 05/22/2024    CHLORIDE 103 03/09/2023    CO2 28 05/22/2024    CO2 22 03/09/2023    BUN 15.4 05/22/2024    BUN 10.6 03/09/2023    CR 1.17 05/22/2024    CR 0.84 03/09/2023    GLC 86 05/22/2024     (H) 03/10/2023     COAGS:   Lab Results   Component Value Date    PTT 27 03/07/2023    INR 1.06 03/07/2023     POC: No results found for: \"BGM\", \"HCG\", \"HCGS\"  HEPATIC:   Lab Results   Component Value Date    ALBUMIN 4.3 05/22/2024    PROTTOTAL 7.1 05/22/2024    ALT 24 05/22/2024    AST 28 05/22/2024    ALKPHOS 72 05/22/2024    BILITOTAL 0.6 05/22/2024     OTHER:   Lab Results   Component Value Date    LEA 9.6 05/22/2024    MAG 2.0 03/09/2023    TSH 0.24 (L) 12/05/2022    T4 0.85 12/05/2022       Anesthesia Plan    ASA Status:  2    NPO Status:  NPO Appropriate    Anesthesia Type: MAC.     - Reason for MAC: straight local not clinically adequate              Consents    Anesthesia Plan(s) and associated risks, benefits, and realistic alternatives discussed. Questions answered and patient/representative(s) expressed understanding.     - Discussed:     - Discussed with:  Patient            Postoperative Care    Pain management: Multi-modal analgesia.   PONV prophylaxis: Ondansetron (or other 5HT-3), Background Propofol Infusion     Comments:               Beba Patel MD, MD    I have reviewed the pertinent notes and labs in the chart from the past 30 days and (re)examined the patient.  Any updates or " changes from those notes are reflected in this note.

## 2024-06-04 NOTE — DISCHARGE INSTRUCTIONS
Same Day Surgery Discharge Instructions for  Sedation and General Anesthesia     It's not unusual to feel dizzy, light-headed or faint for up to 24 hours after surgery or while taking pain medication.  If you have these symptoms: sit for a few minutes before standing and have someone assist you when you get up to walk or use the bathroom.    You should rest and relax for the next 24 hours. We recommend you make arrangements to have an adult stay with you for at least 24 hours after your discharge.  Avoid hazardous and strenuous activity.    DO NOT DRIVE any vehicle or operate mechanical equipment for 24 hours following the end of your surgery.  Even though you may feel normal, your reactions may be affected by the medication you have received.    Do not drink alcoholic beverages for 24 hours following surgery.     Slowly progress to your regular diet as you feel able. It's not unusual to feel nauseated and/or vomit after receiving anesthesia.  If you develop these symptoms, drink clear liquids (apple juice, ginger ale, broth, 7-up, etc. ) until you feel better.  If your nausea and vomiting persists for 24 hours, please notify your surgeon.      All narcotic pain medications, along with inactivity and anesthesia, can cause constipation. Drinking plenty of liquids and increasing fiber intake will help.    For any questions of a medical nature, call your surgeon.    Do not make important decisions for 24 hours.    If you had general anesthesia, you may have a sore throat for a couple of days related to the breathing tube used during surgery.  You may use Cepacol lozenges to help with this discomfort.  If it worsens or if you develop a fever, contact your surgeon.     If you feel your pain is not well managed with the pain medications prescribed by your surgeon, please contact your surgeon's office to let them know so they can address your concerns.      **If you have questions or concerns about your procedure,  call  Dr. Ivan at [phone]**

## 2024-06-04 NOTE — ANESTHESIA CARE TRANSFER NOTE
Patient: Edin Robles    Procedure: Procedure(s):  CHEILECTOMY/ bone spur removal and synovectomy       Diagnosis: Hallux rigidus of left foot [M20.22]  Bone spur of left foot [M77.52]  Skin maceration [L98.8]  Diagnosis Additional Information: No value filed.    Anesthesia Type:   MAC     Note:    Oropharynx: oropharynx clear of all foreign objects and spontaneously breathing  Level of Consciousness: awake  Oxygen Supplementation: face mask  Level of Supplemental Oxygen (L/min / FiO2): 8  Independent Airway: airway patency satisfactory and stable  Dentition: dentition unchanged  Vital Signs Stable: post-procedure vital signs reviewed and stable  Report to RN Given: handoff report given  Patient transferred to: PACU    Handoff Report: Identifed the Patient, Identified the Reponsible Provider, Reviewed the pertinent medical history, Discussed the surgical course, Reviewed Intra-OP anesthesia mangement and issues during anesthesia, Set expectations for post-procedure period and Allowed opportunity for questions and acknowledgement of understanding      Vitals:  Vitals Value Taken Time   /95    Temp     Pulse 51    Resp 13 06/04/24 0840   SpO2 97 % 06/04/24 0840   Vitals shown include unfiled device data.    Electronically Signed By: JOSSELINE Barrett CRNA  June 4, 2024  8:41 AM

## 2024-06-04 NOTE — OP NOTE
Operative Report    June 4, 2024        SURGEON:  Doni Ivan DPM, SHERRY, MS    PREOPERATIVE DIAGNOSIS:   1) advanced degenerative joint disease with prolific spurring, left first metatarsal phalangeal joint  2) synovitis left first metatarsophalangeal joint      POSTOPERATIVE DIAGNOSIS: Same    PROCEDURE(S):  1) cheilectomy left first metatarsophalangeal joint  2) synovectomy left first metatarsal phalangeal joint    ANESTHESIA: MAC with local converted to general due to patient movement    HEMOSTASIS: Ankle pneumatic tourniquet at 250 mmHg for 43 minutes    ESTIMATED BLOOD LOSS: 2 mL    MATERIALS: Absorbable nonabsorbable suture material    INJECTABLES:  0.5% Marcaine injected pre-operatively  2% lidocaine plain injected intraoperatively    COMPLICATIONS:   None apparent    INTRAOPERATIVE FINDINGS: There is a large osteophyte dorsally over the left first metatarsophalangeal joint that was removed.  This measured approximately 1 cm x 0.8 cm.  Consistent with imaging, prolific periarticular spurring.  The synovium was noted to be extremely thickened and dystrophic around this region.  Therefore synovectomy was deemed medically necessary.    INDICATIONS FOR SURGERY:  Edin Robles is a 68-year-old male who saw me previously in clinic for ongoing and progressive pain related to prolific spurring around the left first metatarsal phalangeal joint.  Clinical presentation and radiographic exam are consistent with advanced degenerative changes.  His pain was described as more related to the bony spurring than deeper arthritic pain.  He inquired about surgical intervention.  The cheilectomy procedure was discussed including the postoperative cares, course and prognosis.  No guarantees were given.  He elected to proceed with the procedure.  For details regarding the most recent clinical exam and discussion, please refer to the clinic note from 5/22/2024.    PROCEDURE: Edin Robles was transported to the  operating room and placed supine on the operating table.  IV sedation was initiated.  A timeout was called and local anesthetic injected into the left foot.  The left foot was then prepped and draped in the normal aseptic fashion.  A timeout for the procedure was called.      The foot was exsanguinated and the ankle tourniquet inflated. The standard longitudinal incision was made spanning the left first metatarsophalangeal joint, medial to the extensor hallucis longus tendon.  Layered dissection was performed. Bleeding vessels were electrocauterized. The incision was then carried through the capsular and periosteal tissues to the level of bone.  Medial and lateral subcapsular and subperiosteal reflection was performed.    A large dorsal osteophyte was identified and removed.  Once the joint was exposed, dorsal bony spurring was removed via a bone rongeur and sagittal saw.  The more medial bony eminence was resected with a sagittal saw.  Rasping was done throughout.  Intraoperative imaging confirmed satisfactory reduction of the bony spurring.  No change in alignment of the toe.  There was no articular cartilage noted within this joint.    Due to thickened and excessive synovial tissue, a synovectomy was deemed medically necessary.  Using a #15 scalpel, redundant synovium was excised.  It was debulked.     The wound was irrigated with a copious amount of normal sterile saline.  Layered closure was performed.  A well-padded compressive dressing was placed.    Counts were correct.  EBL 2 mL.  No complications.    Edin Robles tolerated the anesthesia procedure well.    Doni Ivan DPM, FACFAS, MS  [facility] Department of Podiatry/Foot & Ankle Surgery

## 2024-06-04 NOTE — OR NURSING
Patient continuously voiding via purewick catheter. Patient states her bladder still feels full. Output per purewick is approximately 600 ml. Patient bladder scanned reading 602 ml. Will notify Dr. Mendosa.

## 2024-06-12 ENCOUNTER — OFFICE VISIT (OUTPATIENT)
Dept: PODIATRY | Facility: CLINIC | Age: 69
End: 2024-06-12
Payer: COMMERCIAL

## 2024-06-12 VITALS — DIASTOLIC BLOOD PRESSURE: 82 MMHG | SYSTOLIC BLOOD PRESSURE: 120 MMHG

## 2024-06-12 DIAGNOSIS — Z09 SURGERY FOLLOW-UP EXAMINATION: Primary | ICD-10-CM

## 2024-06-12 PROCEDURE — 99024 POSTOP FOLLOW-UP VISIT: CPT | Performed by: PODIATRIST

## 2024-06-12 NOTE — LETTER
6/12/2024      Edin Robels  [address] [place] [id number]      Dear Colleague,    Thank you for referring your patient, Edin Robles, to the [facility]. Please see a copy of my visit note below.    ASSESSMENT:  Encounter Diagnosis   Name Primary?     Surgery follow-up examination Yes     MEDICAL DECISION MAKING:  Surgical site is stable clinically.  No clinical signs of infection.  A dressing was reapplied in a sterile fashion.  I reviewed the preoperative and postoperative x-ray images with Edin.  He is to continue cam walker protection for another week.  He is encouraged to remove the device for ankle range of motion exercises.  He asked for a handicap parking permit.  An application was provided for temporary handicap parking.  Follow-up in 1 week for likely suture removal.    Disclaimer: This note consists of symbols derived from keyboarding, dictation and/or voice recognition software. As a result, there may be errors in the script that have gone undetected. Please consider this when interpreting information found in this chart.    Doni Ivan DPM, FACFAS, MS    [facility] Department of Podiatry/Foot & Ankle Surgery      ____________________________________________________________________    HPI:       Edin presents over 1 week postop.  No complaints.    PREOPERATIVE DIAGNOSIS:   1) advanced degenerative joint disease with prolific spurring, left first metatarsal phalangeal joint  2) synovitis left first metatarsophalangeal joint      PROCEDURE(S):  1) cheilectomy left first metatarsophalangeal joint  2) synovectomy left first metatarsal phalangeal joint    INTRAOPERATIVE FINDINGS: There is a large osteophyte dorsally over the left first metatarsophalangeal joint that was removed.  This measured approximately 1 cm x 0.8 cm.  Consistent with imaging, prolific periarticular spurring.  The synovium was noted to be extremely thickened and dystrophic around  this region.  Therefore synovectomy was deemed medically necessary.  *  Past Medical History:   Diagnosis Date     Colon cancer (H) 3/8/2023     Depressive disorder 1995     Hypertension    *  *  Past Surgical History:   Procedure Laterality Date     CHEILECTOMY Left 6/4/2024    Procedure: CHEILECTOMY/ bone spur removal and synovectomy;  Surgeon: Doni Ivan DPM;  [facility]     COLONOSCOPY N/A 01/06/2023    Procedure: COLONOSCOPY, WITH POLYPECTOMY AND BIOPSY;  Surgeon: Liborio Tucker MD;  Location: [facility] OR     COLONOSCOPY       EYE SURGERY       HERNIORRHAPHY UMBILICAL N/A 3/8/2023    Procedure: Herniorrhaphy umbilical Repair;  Surgeon: Nir Escobar MD;  Location: [facility] OR   *  *  Current Outpatient Medications   Medication Sig Dispense Refill     acetaminophen (TYLENOL) 500 MG tablet Take 1-2 tablets (500-1,000 mg) by mouth every 8 hours as needed for mild pain 42 tablet 0     acetaminophen (TYLENOL) 500 MG tablet Take 2 tablets (1,000 mg) by mouth every 6 hours 100 tablet 0     amLODIPine (NORVASC) 10 MG tablet TAKE 1 TABLET (10 MG) BY MOUTH DAILY. 90 tablet 0     atorvastatin (LIPITOR) 20 MG tablet TAKE 1 TABLET (20 MG) BY MOUTH EVERY MORNING +++ APPOINTMENT NEEDED FOR ADDITIONAL REFILLS +++ 30 tablet 0     Bioflavonoid Products (VITAMIN C) CHEW Take by mouth every morning       calcium carbonate-vitamin D (OSCAL) 250-3.125 MG-MCG TABS per tablet Take 1 tablet by mouth every morning       hydrochlorothiazide (HYDRODIURIL) 25 MG tablet TAKE 1 TABLET BY MOUTH EVERY DAY 90 tablet 0     ibuprofen (ADVIL/MOTRIN) 600 MG tablet Take 1 tablet (600 mg) by mouth every 6 hours as needed 28 tablet 1     Moringa Oleifera (MORINGA PO) Take by mouth every morning       Omega-3 Fatty Acids (FISH OIL) 1200 MG capsule Take 1,200 mg by mouth every morning       TURMERIC PO Take by mouth every morning       UNABLE TO FIND every morning MEDICATION NAME: Neem       vitamin B complex with vitamin C (STRESS TAB)  tablet Take 1 tablet by mouth every morning           EXAM:    Vitals: /82   BMI: There is no height or weight on file to calculate BMI.      Vascular: Mild medial left foot edema.    Neuro: Light touch sensation is intact to the distal left hallux.    Derm: Incision is well coapted.  Sutures intact.  No erythema.  No ischemic changes.    Musculoskeletal:   Interval reduction in the bone spur/bony widening around the left first metatarsal phalangeal joint            Again, thank you for allowing me to participate in the care of your patient.        Sincerely,        Doni Ivan DPM

## 2024-06-12 NOTE — PROGRESS NOTES
ASSESSMENT:  Encounter Diagnosis   Name Primary?    Surgery follow-up examination Yes     MEDICAL DECISION MAKING:  Surgical site is stable clinically.  No clinical signs of infection.  A dressing was reapplied in a sterile fashion.  I reviewed the preoperative and postoperative x-ray images with Edin.  He is to continue cam walker protection for another week.  He is encouraged to remove the device for ankle range of motion exercises.  He asked for a handicap parking permit.  An application was provided for temporary handicap parking.  Follow-up in 1 week for likely suture removal.    Disclaimer: This note consists of symbols derived from keyboarding, dictation and/or voice recognition software. As a result, there may be errors in the script that have gone undetected. Please consider this when interpreting information found in this chart.    Doni Ivan DPM, SHERRY, [facility] Department of Podiatry/Foot & Ankle Surgery      ____________________________________________________________________    HPI:       Edin presents over 1 week postop.  No complaints.    PREOPERATIVE DIAGNOSIS:   1) advanced degenerative joint disease with prolific spurring, left first metatarsal phalangeal joint  2) synovitis left first metatarsophalangeal joint      PROCEDURE(S):  1) cheilectomy left first metatarsophalangeal joint  2) synovectomy left first metatarsal phalangeal joint    INTRAOPERATIVE FINDINGS: There is a large osteophyte dorsally over the left first metatarsophalangeal joint that was removed.  This measured approximately 1 cm x 0.8 cm.  Consistent with imaging, prolific periarticular spurring.  The synovium was noted to be extremely thickened and dystrophic around this region.  Therefore synovectomy was deemed medically necessary.  *  Past Medical History:   Diagnosis Date    Colon cancer (H) 3/8/2023    Depressive disorder 1995    Hypertension    *  *  Past Surgical History:   Procedure Laterality Date     CHEILECTOMY Left 6/4/2024    Procedure: CHEILECTOMY/ bone spur removal and synovectomy;  Surgeon: Doni Ivan DPM;  [facility]    COLONOSCOPY N/A 01/06/2023    Procedure: COLONOSCOPY, WITH POLYPECTOMY AND BIOPSY;  Surgeon: Liborio Tucker MD;  Location: [facility] OR    COLONOSCOPY      EYE SURGERY      HERNIORRHAPHY UMBILICAL N/A 3/8/2023    Procedure: Herniorrhaphy umbilical Repair;  Surgeon: Nir Escobar MD;  Location: [facility]   *  *  Current Outpatient Medications   Medication Sig Dispense Refill    acetaminophen (TYLENOL) 500 MG tablet Take 1-2 tablets (500-1,000 mg) by mouth every 8 hours as needed for mild pain 42 tablet 0    acetaminophen (TYLENOL) 500 MG tablet Take 2 tablets (1,000 mg) by mouth every 6 hours 100 tablet 0    amLODIPine (NORVASC) 10 MG tablet TAKE 1 TABLET (10 MG) BY MOUTH DAILY. 90 tablet 0    atorvastatin (LIPITOR) 20 MG tablet TAKE 1 TABLET (20 MG) BY MOUTH EVERY MORNING +++ APPOINTMENT NEEDED FOR ADDITIONAL REFILLS +++ 30 tablet 0    Bioflavonoid Products (VITAMIN C) CHEW Take by mouth every morning      calcium carbonate-vitamin D (OSCAL) 250-3.125 MG-MCG TABS per tablet Take 1 tablet by mouth every morning      hydrochlorothiazide (HYDRODIURIL) 25 MG tablet TAKE 1 TABLET BY MOUTH EVERY DAY 90 tablet 0    ibuprofen (ADVIL/MOTRIN) 600 MG tablet Take 1 tablet (600 mg) by mouth every 6 hours as needed 28 tablet 1    Moringa Oleifera (MORINGA PO) Take by mouth every morning      Omega-3 Fatty Acids (FISH OIL) 1200 MG capsule Take 1,200 mg by mouth every morning      TURMERIC PO Take by mouth every morning      UNABLE TO FIND every morning MEDICATION NAME: Neem      vitamin B complex with vitamin C (STRESS TAB) tablet Take 1 tablet by mouth every morning           EXAM:    Vitals: /82   BMI: There is no height or weight on file to calculate BMI.      Vascular: Mild medial left foot edema.    Neuro: Light touch sensation is intact to the distal left hallux.    Derm:  Incision is well coapted.  Sutures intact.  No erythema.  No ischemic changes.    Musculoskeletal:   Interval reduction in the bone spur/bony widening around the left first metatarsal phalangeal joint

## 2024-06-19 ENCOUNTER — OFFICE VISIT (OUTPATIENT)
Dept: PODIATRY | Facility: CLINIC | Age: 69
End: 2024-06-19
Payer: COMMERCIAL

## 2024-06-19 VITALS — WEIGHT: 219 LBS | BODY MASS INDEX: 29.7 KG/M2 | SYSTOLIC BLOOD PRESSURE: 136 MMHG | DIASTOLIC BLOOD PRESSURE: 82 MMHG

## 2024-06-19 DIAGNOSIS — M77.52 BONE SPUR OF LEFT FOOT: ICD-10-CM

## 2024-06-19 DIAGNOSIS — Z09 SURGERY FOLLOW-UP EXAMINATION: Primary | ICD-10-CM

## 2024-06-19 PROCEDURE — 99024 POSTOP FOLLOW-UP VISIT: CPT | Performed by: PODIATRIST

## 2024-06-19 NOTE — LETTER
6/19/2024      Edin Robles  [address] [place] [id number]      Dear Colleague,    Thank you for referring your patient, Edin Robles, to the [facility]. Please see a copy of my visit note below.    ASSESSMENT:  Encounter Diagnoses   Name Primary?     Surgery follow-up examination Yes     Bone spur of left foot      MEDICAL DECISION MAKING:  Incision appears healed and ready for suture removal.  Was prepped with alcohol.  Sutures were removed without difficulty.  Steri-Strips and a light dressing was applied.  Gradual return to regular weightbearing activities.  Follow-up in 1 month.    Disclaimer: This note consists of symbols derived from keyboarding, dictation and/or voice recognition software. As a result, there may be errors in the script that have gone undetected. Please consider this when interpreting information found in this chart.    Doni Ivan DPM, FACJAKE, [facility] Department of Podiatry/Foot & Ankle Surgery      ____________________________________________________________________    HPI:       Edin presents over 2 weeks postop.  No complaints.     PREOPERATIVE DIAGNOSIS:   1) advanced degenerative joint disease with prolific spurring, left first metatarsal phalangeal joint  2) synovitis left first metatarsophalangeal joint      PROCEDURE(S):  1) cheilectomy left first metatarsophalangeal joint  2) synovectomy left first metatarsal phalangeal joint     INTRAOPERATIVE FINDINGS: There is a large osteophyte dorsally over the left first metatarsophalangeal joint that was removed.  This measured approximately 1 cm x 0.8 cm.  Consistent with imaging, prolific periarticular spurring.  The synovium was noted to be extremely thickened and dystrophic around this region.  Therefore synovectomy was deemed medically necessary.  *  *  Past Medical History:   Diagnosis Date     Colon cancer (H) 3/8/2023     Depressive disorder 1995     Hypertension     *  *  Past Surgical History:   Procedure Laterality Date     CHEILECTOMY Left 6/4/2024    Procedure: CHEILECTOMY/ bone spur removal and synovectomy;  Surgeon: Doni Ivan DPM;  [facility]     COLONOSCOPY N/A 01/06/2023    Procedure: COLONOSCOPY, WITH POLYPECTOMY AND BIOPSY;  Surgeon: Liborio Tucker MD;  Location: [facility] OR     COLONOSCOPY       EYE SURGERY       HERNIORRHAPHY UMBILICAL N/A 3/8/2023    Procedure: Herniorrhaphy umbilical Repair;  Surgeon: Nir Escobar MD;  Location: [facility]   *  *  Current Outpatient Medications   Medication Sig Dispense Refill     acetaminophen (TYLENOL) 500 MG tablet Take 1-2 tablets (500-1,000 mg) by mouth every 8 hours as needed for mild pain 42 tablet 0     acetaminophen (TYLENOL) 500 MG tablet Take 2 tablets (1,000 mg) by mouth every 6 hours 100 tablet 0     amLODIPine (NORVASC) 10 MG tablet TAKE 1 TABLET (10 MG) BY MOUTH DAILY. 90 tablet 0     atorvastatin (LIPITOR) 20 MG tablet TAKE 1 TABLET (20 MG) BY MOUTH EVERY MORNING +++ APPOINTMENT NEEDED FOR ADDITIONAL REFILLS +++ 30 tablet 0     Bioflavonoid Products (VITAMIN C) CHEW Take by mouth every morning       calcium carbonate-vitamin D (OSCAL) 250-3.125 MG-MCG TABS per tablet Take 1 tablet by mouth every morning       hydrochlorothiazide (HYDRODIURIL) 25 MG tablet TAKE 1 TABLET BY MOUTH EVERY DAY 90 tablet 0     ibuprofen (ADVIL/MOTRIN) 600 MG tablet Take 1 tablet (600 mg) by mouth every 6 hours as needed 28 tablet 1     Moringa Oleifera (MORINGA PO) Take by mouth every morning       Omega-3 Fatty Acids (FISH OIL) 1200 MG capsule Take 1,200 mg by mouth every morning       TURMERIC PO Take by mouth every morning       UNABLE TO FIND every morning MEDICATION NAME: Neem       vitamin B complex with vitamin C (STRESS TAB) tablet Take 1 tablet by mouth every morning           EXAM:    Vitals: /82   Wt 99.3 kg (219 lb)   BMI 29.70 kg/m    BMI: Body mass index is 29.7 kg/m .    Derm: Incision is well  coapted.  Sutures intact.  No erythema.  No ischemic changes.     Musculoskeletal:   Interval reduction in the bone spur/bony widening around the left first metatarsal phalangeal joint           Again, thank you for allowing me to participate in the care of your patient.        Sincerely,        Doni Ivan DPM

## 2024-06-19 NOTE — PROGRESS NOTES
ASSESSMENT:  Encounter Diagnoses   Name Primary?    Surgery follow-up examination Yes    Bone spur of left foot      MEDICAL DECISION MAKING:  Incision appears healed and ready for suture removal.  Was prepped with alcohol.  Sutures were removed without difficulty.  Steri-Strips and a light dressing was applied.  Gradual return to regular weightbearing activities.  Follow-up in 1 month.    Disclaimer: This note consists of symbols derived from keyboarding, dictation and/or voice recognition software. As a result, there may be errors in the script that have gone undetected. Please consider this when interpreting information found in this chart.    Doni Ivan DPM, FACFAS, MS    [place] Department of Podiatry/Foot & Ankle Surgery      ____________________________________________________________________    HPI:       Edin presents over 2 weeks postop.  No complaints.     PREOPERATIVE DIAGNOSIS:   1) advanced degenerative joint disease with prolific spurring, left first metatarsal phalangeal joint  2) synovitis left first metatarsophalangeal joint      PROCEDURE(S):  1) cheilectomy left first metatarsophalangeal joint  2) synovectomy left first metatarsal phalangeal joint     INTRAOPERATIVE FINDINGS: There is a large osteophyte dorsally over the left first metatarsophalangeal joint that was removed.  This measured approximately 1 cm x 0.8 cm.  Consistent with imaging, prolific periarticular spurring.  The synovium was noted to be extremely thickened and dystrophic around this region.  Therefore synovectomy was deemed medically necessary.  *  *  Past Medical History:   Diagnosis Date    Colon cancer (H) 3/8/2023    Depressive disorder 1995    Hypertension    *  *  Past Surgical History:   Procedure Laterality Date    CHEILECTOMY Left 6/4/2024    Procedure: CHEILECTOMY/ bone spur removal and synovectomy;  Surgeon: Doni Ivan DPM;  [facility]: [facility]    COLONOSCOPY N/A 01/06/2023    Procedure: COLONOSCOPY, WITH  POLYPECTOMY AND BIOPSY;  Surgeon: Liborio Tucker MD;  Location: [facility] OR    COLONOSCOPY      EYE SURGERY      HERNIORRHAPHY UMBILICAL N/A 3/8/2023    Procedure: Herniorrhaphy umbilical Repair;  Surgeon: Nir Escobar MD;  Location: [facility] OR   *  *  Current Outpatient Medications   Medication Sig Dispense Refill    acetaminophen (TYLENOL) 500 MG tablet Take 1-2 tablets (500-1,000 mg) by mouth every 8 hours as needed for mild pain 42 tablet 0    acetaminophen (TYLENOL) 500 MG tablet Take 2 tablets (1,000 mg) by mouth every 6 hours 100 tablet 0    amLODIPine (NORVASC) 10 MG tablet TAKE 1 TABLET (10 MG) BY MOUTH DAILY. 90 tablet 0    atorvastatin (LIPITOR) 20 MG tablet TAKE 1 TABLET (20 MG) BY MOUTH EVERY MORNING +++ APPOINTMENT NEEDED FOR ADDITIONAL REFILLS +++ 30 tablet 0    Bioflavonoid Products (VITAMIN C) CHEW Take by mouth every morning      calcium carbonate-vitamin D (OSCAL) 250-3.125 MG-MCG TABS per tablet Take 1 tablet by mouth every morning      hydrochlorothiazide (HYDRODIURIL) 25 MG tablet TAKE 1 TABLET BY MOUTH EVERY DAY 90 tablet 0    ibuprofen (ADVIL/MOTRIN) 600 MG tablet Take 1 tablet (600 mg) by mouth every 6 hours as needed 28 tablet 1    Moringa Oleifera (MORINGA PO) Take by mouth every morning      Omega-3 Fatty Acids (FISH OIL) 1200 MG capsule Take 1,200 mg by mouth every morning      TURMERIC PO Take by mouth every morning      UNABLE TO FIND every morning MEDICATION NAME: Neem      vitamin B complex with vitamin C (STRESS TAB) tablet Take 1 tablet by mouth every morning           EXAM:    Vitals: /82   Wt 99.3 kg (219 lb)   BMI 29.70 kg/m    BMI: Body mass index is 29.7 kg/m .    Derm: Incision is well coapted.  Sutures intact.  No erythema.  No ischemic changes.     Musculoskeletal:   Interval reduction in the bone spur/bony widening around the left first metatarsal phalangeal joint

## 2024-06-28 DIAGNOSIS — E78.5 HYPERLIPIDEMIA, UNSPECIFIED HYPERLIPIDEMIA TYPE: ICD-10-CM

## 2024-06-28 RX ORDER — ATORVASTATIN CALCIUM 20 MG/1
20 TABLET, FILM COATED ORAL EVERY MORNING
Qty: 90 TABLET | Refills: 0 | Status: SHIPPED | OUTPATIENT
Start: 2024-06-28 | End: 2024-09-15

## 2024-07-18 ENCOUNTER — TELEPHONE (OUTPATIENT)
Dept: PODIATRY | Facility: CLINIC | Age: 69
End: 2024-07-18
Payer: COMMERCIAL

## 2024-07-18 NOTE — TELEPHONE ENCOUNTER
Patient is hoping to  a form for unemployment regarding his surgery. He is hoping to pick the form up at [facility] after being filled out by Dr. Ivan.    Please call     [phone]     Any time, okay to leave detailed msg.

## 2024-07-31 ENCOUNTER — OFFICE VISIT (OUTPATIENT)
Dept: PODIATRY | Facility: CLINIC | Age: 69
End: 2024-07-31
Payer: COMMERCIAL

## 2024-07-31 VITALS — SYSTOLIC BLOOD PRESSURE: 121 MMHG | DIASTOLIC BLOOD PRESSURE: 66 MMHG

## 2024-07-31 DIAGNOSIS — Z09 SURGERY FOLLOW-UP EXAMINATION: Primary | ICD-10-CM

## 2024-07-31 PROCEDURE — 99024 POSTOP FOLLOW-UP VISIT: CPT | Performed by: PODIATRIST

## 2024-07-31 NOTE — LETTER
7/31/2024      Edin Robles  [address] [place] [id number]      Dear Colleague,    Thank you for referring your patient, Edin Robles, to the [facility]. Please see a copy of my visit note below.      ASSESSMENT:  Encounter Diagnosis   Name Primary?     Surgery follow-up examination Yes     MEDICAL DECISION MAKING:  Surgical site is stable.  He is doing well in terms of weightbearing activities.  Recommend avoidance of higher impact activity for another 6 weeks.  Lower impact activity and supportive and stiffer soled shoes is acceptable.    He asked for a letter for work stating any needed restrictions.  He is currently working a part-time security job.  The letter was provided:  Return on 8/1/2024  50% weightbearing and 50% seated work  Allowed to wear the Aircast if needed  Return to work without restrictions on 9/1/2024    Follow-up on an as-needed basis    Disclaimer: This note consists of symbols derived from keyboarding, dictation and/or voice recognition software. As a result, there may be errors in the script that have gone undetected. Please consider this when interpreting information found in this chart.    Doni Ivan DPM, FACFAS, MS    [place] Department of Podiatry/Foot & Ankle Surgery      ____________________________________________________________________    HPI:       Edin presents 6 weeks postop.  No complaints.     PREOPERATIVE DIAGNOSIS:   1) advanced degenerative joint disease with prolific spurring, left first metatarsal phalangeal joint  2) synovitis left first metatarsophalangeal joint      PROCEDURE(S):  1) cheilectomy left first metatarsophalangeal joint  2) synovectomy left first metatarsal phalangeal joint     INTRAOPERATIVE FINDINGS: There is a large osteophyte dorsally over the left first metatarsophalangeal joint that was removed.  This measured approximately 1 cm x 0.8 cm.  Consistent with imaging, prolific periarticular  spurring.  The synovium was noted to be extremely thickened and dystrophic around this region.  Therefore synovectomy was deemed medically necessary.  *      *  Past Medical History:   Diagnosis Date     Colon cancer (H) 3/8/2023     Depressive disorder 1995     Hypertension    *  *  Past Surgical History:   Procedure Laterality Date     CHEILECTOMY Left 6/4/2024    Procedure: CHEILECTOMY/ bone spur removal and synovectomy;  Surgeon: Doni Ivan DPM;  Location: [facility] OR     COLONOSCOPY N/A 01/06/2023    Procedure: COLONOSCOPY, WITH POLYPECTOMY AND BIOPSY;  Surgeon: Liborio Tucker MD;  [facility]: [facility]     COLONOSCOPY       EYE SURGERY       HERNIORRHAPHY UMBILICAL N/A 3/8/2023    Procedure: Herniorrhaphy umbilical Repair;  Surgeon: Nir Escobar MD;  Location:  OR   *  *  Current Outpatient Medications   Medication Sig Dispense Refill     acetaminophen (TYLENOL) 500 MG tablet Take 1-2 tablets (500-1,000 mg) by mouth every 8 hours as needed for mild pain 42 tablet 0     acetaminophen (TYLENOL) 500 MG tablet Take 2 tablets (1,000 mg) by mouth every 6 hours 100 tablet 0     amLODIPine (NORVASC) 10 MG tablet TAKE 1 TABLET (10 MG) BY MOUTH DAILY. 90 tablet 0     atorvastatin (LIPITOR) 20 MG tablet TAKE 1 TABLET (20 MG) BY MOUTH EVERY MORNING +++ APPOINTMENT NEEDED FOR ADDITIONAL REFILLS +++ 90 tablet 0     Bioflavonoid Products (VITAMIN C) CHEW Take by mouth every morning       calcium carbonate-vitamin D (OSCAL) 250-3.125 MG-MCG TABS per tablet Take 1 tablet by mouth every morning       hydrochlorothiazide (HYDRODIURIL) 25 MG tablet TAKE 1 TABLET BY MOUTH EVERY DAY 90 tablet 0     ibuprofen (ADVIL/MOTRIN) 600 MG tablet Take 1 tablet (600 mg) by mouth every 6 hours as needed 28 tablet 1     Moringa Oleifera (MORINGA PO) Take by mouth every morning       Omega-3 Fatty Acids (FISH OIL) 1200 MG capsule Take 1,200 mg by mouth every morning       TURMERIC PO Take by mouth every morning       UNABLE TO FIND  every morning MEDICATION NAME: Neem       vitamin B complex with vitamin C (STRESS TAB) tablet Take 1 tablet by mouth every morning           EXAM:    Vitals: There were no vitals taken for this visit.  BMI: There is no height or weight on file to calculate BMI.    Derm: Incision is well coapted.  Sutures intact.  No erythema.  No ischemic changes.  Mild edema and skin lines are evident.     Musculoskeletal:   Interval reduction in the bone spur/bony widening around the left first metatarsal phalangeal joint              Again, thank you for allowing me to participate in the care of your patient.        Sincerely,        Doni Ivan DPM

## 2024-07-31 NOTE — LETTER
July 31, 2024      Edin Robles  [address] [facility] [address]        To Whom It May Concern:    Edin Robles was seen in the podiatry clinic today, 7/31/2024.  He is approximately 6 weeks status post left foot surgery.  He is doing well.    He can return to work on 8/1/2024.  We asked that he be allowed to work 50% seated and 50% standing.  He should also be allowed to wear the immobilizing boot, if he needs it for discomfort.  As of 9/1/2024, returned to work without restrictions.    Sincerely,      Doni Ivan

## 2024-07-31 NOTE — PROGRESS NOTES
ASSESSMENT:  Encounter Diagnosis   Name Primary?    Surgery follow-up examination Yes     MEDICAL DECISION MAKING:  Surgical site is stable.  He is doing well in terms of weightbearing activities.  Recommend avoidance of higher impact activity for another 6 weeks.  Lower impact activity and supportive and stiffer soled shoes is acceptable.    He asked for a letter for work stating any needed restrictions.  He is currently working a part-time security job.  The letter was provided:  Return on 8/1/2024  50% weightbearing and 50% seated work  Allowed to wear the Aircast if needed  Return to work without restrictions on 9/1/2024    Follow-up on an as-needed basis    Disclaimer: This note consists of symbols derived from keyboarding, dictation and/or voice recognition software. As a result, there may be errors in the script that have gone undetected. Please consider this when interpreting information found in this chart.    Doni Ivan DPM, SHERRY, MS    [facility]      ____________________________________________________________________    HPI:       Edin presents 6 weeks postop.  No complaints.     PREOPERATIVE DIAGNOSIS:   1) advanced degenerative joint disease with prolific spurring, left first metatarsal phalangeal joint  2) synovitis left first metatarsophalangeal joint      PROCEDURE(S):  1) cheilectomy left first metatarsophalangeal joint  2) synovectomy left first metatarsal phalangeal joint     INTRAOPERATIVE FINDINGS: There is a large osteophyte dorsally over the left first metatarsophalangeal joint that was removed.  This measured approximately 1 cm x 0.8 cm.  Consistent with imaging, prolific periarticular spurring.  The synovium was noted to be extremely thickened and dystrophic around this region.  Therefore synovectomy was deemed medically necessary.  *      *  Past Medical History:   Diagnosis Date    Colon cancer ([facility]) 3/8/2023    Depressive disorder 1995     Hypertension    *  *  Past Surgical History:   Procedure Laterality Date    CHEILECTOMY Left 6/4/2024    Procedure: CHEILECTOMY/ bone spur removal and synovectomy;  Surgeon: Doni Ivan DPM;  Location: [facility] OR    COLONOSCOPY N/A 01/06/2023    Procedure: COLONOSCOPY, WITH POLYPECTOMY AND BIOPSY;  Surgeon: Liborio Tucker MD;  Location: [facility] OR    COLONOSCOPY      EYE SURGERY      HERNIORRHAPHY UMBILICAL N/A 3/8/2023    Procedure: Herniorrhaphy umbilical Repair;  Surgeon: Nir Escobar MD;  Location: [facility] OR   *  *  Current Outpatient Medications   Medication Sig Dispense Refill    acetaminophen (TYLENOL) 500 MG tablet Take 1-2 tablets (500-1,000 mg) by mouth every 8 hours as needed for mild pain 42 tablet 0    acetaminophen (TYLENOL) 500 MG tablet Take 2 tablets (1,000 mg) by mouth every 6 hours 100 tablet 0    amLODIPine (NORVASC) 10 MG tablet TAKE 1 TABLET (10 MG) BY MOUTH DAILY. 90 tablet 0    atorvastatin (LIPITOR) 20 MG tablet TAKE 1 TABLET (20 MG) BY MOUTH EVERY MORNING +++ APPOINTMENT NEEDED FOR ADDITIONAL REFILLS +++ 90 tablet 0    Bioflavonoid Products (VITAMIN C) CHEW Take by mouth every morning      calcium carbonate-vitamin D (OSCAL) 250-3.125 MG-MCG TABS per tablet Take 1 tablet by mouth every morning      hydrochlorothiazide (HYDRODIURIL) 25 MG tablet TAKE 1 TABLET BY MOUTH EVERY DAY 90 tablet 0    ibuprofen (ADVIL/MOTRIN) 600 MG tablet Take 1 tablet (600 mg) by mouth every 6 hours as needed 28 tablet 1    Moringa Oleifera (MORINGA PO) Take by mouth every morning      Omega-3 Fatty Acids (FISH OIL) 1200 MG capsule Take 1,200 mg by mouth every morning      TURMERIC PO Take by mouth every morning      UNABLE TO FIND every morning MEDICATION NAME: Neem      vitamin B complex with vitamin C (STRESS TAB) tablet Take 1 tablet by mouth every morning           EXAM:    Vitals: There were no vitals taken for this visit.  BMI: There is no height or weight on file to calculate BMI.    Derm: Incision  is well coapted.  Sutures intact.  No erythema.  No ischemic changes.  Mild edema and skin lines are evident.     Musculoskeletal:   Interval reduction in the bone spur/bony widening around the left first metatarsal phalangeal joint

## 2024-08-27 ENCOUNTER — PATIENT OUTREACH (OUTPATIENT)
Dept: CARE COORDINATION | Facility: CLINIC | Age: 69
End: 2024-08-27
Payer: COMMERCIAL

## 2024-08-31 DIAGNOSIS — I73.00 RAYNAUD'S DISEASE WITHOUT GANGRENE: ICD-10-CM

## 2024-08-31 DIAGNOSIS — I10 PRIMARY HYPERTENSION: ICD-10-CM

## 2024-09-02 RX ORDER — AMLODIPINE BESYLATE 10 MG/1
10 TABLET ORAL DAILY
Qty: 90 TABLET | Refills: 0 | Status: SHIPPED | OUTPATIENT
Start: 2024-09-02

## 2024-09-02 RX ORDER — HYDROCHLOROTHIAZIDE 25 MG/1
TABLET ORAL
Qty: 90 TABLET | Refills: 0 | Status: SHIPPED | OUTPATIENT
Start: 2024-09-02

## 2024-09-12 ENCOUNTER — TELEPHONE (OUTPATIENT)
Dept: FAMILY MEDICINE | Facility: CLINIC | Age: 69
End: 2024-09-12

## 2024-09-12 ENCOUNTER — VIRTUAL VISIT (OUTPATIENT)
Dept: FAMILY MEDICINE | Facility: CLINIC | Age: 69
End: 2024-09-12
Payer: COMMERCIAL

## 2024-09-12 DIAGNOSIS — B35.3 TINEA PEDIS OF LEFT FOOT: ICD-10-CM

## 2024-09-12 DIAGNOSIS — R73.9 HYPERGLYCEMIA: ICD-10-CM

## 2024-09-12 DIAGNOSIS — R79.89 LOW TSH LEVEL: ICD-10-CM

## 2024-09-12 DIAGNOSIS — R53.82 CHRONIC FATIGUE: Primary | ICD-10-CM

## 2024-09-12 DIAGNOSIS — I10 PRIMARY HYPERTENSION: ICD-10-CM

## 2024-09-12 DIAGNOSIS — E78.5 HYPERLIPIDEMIA, UNSPECIFIED HYPERLIPIDEMIA TYPE: ICD-10-CM

## 2024-09-12 PROCEDURE — 99443 PR PHYSICIAN TELEPHONE EVALUATION 21-30 MIN: CPT | Mod: 93 | Performed by: FAMILY MEDICINE

## 2024-09-12 RX ORDER — FLUCONAZOLE 150 MG/1
150 TABLET ORAL WEEKLY
Qty: 4 TABLET | Refills: 0 | Status: SHIPPED | OUTPATIENT
Start: 2024-09-12 | End: 2024-10-04

## 2024-09-12 ASSESSMENT — ENCOUNTER SYMPTOMS: FATIGUE: 1

## 2024-09-12 NOTE — PATIENT INSTRUCTIONS
Return to clinic for labs to assess current fatigue symptoms.    I would recommend you follow up with [facility] for [facility]oscopy.    For the foot rash - use fluconazole 150  mg one weekly for 4 weeks.    Keep feet dry as much as possible.

## 2024-09-12 NOTE — PROGRESS NOTES
Edin is a 69 year old who is being evaluated via a billable telephone visit.    What phone number would you like to be contacted at? [phone]   How would you like to obtain your AVS? Liz  Originating Location (pt. Location): Home    Distant Location (provider location):  On-site    Assessment & Plan     Chronic fatigue  Return to clinic for lab evaluation of the fatigue symptom.     - CBC with platelets; Future  - TSH with free T4 reflex; Future  - Comprehensive metabolic panel (BMP + Alb, Alk Phos, ALT, AST, Total. Bili, TP); Future  - ESR: Erythrocyte sedimentation rate; Future    Primary hypertension  Treated with amlodipine and hydrochlorothiazide.  Labs as noted.      Low TSH level  Reassess Thyroid function - lab appointment recommended    Hyperlipidemia, unspecified hyperlipidemia type  Continue atorvastatin.  Refills updated.      Tinea pedis of left foot  Unresolved after oral medication.  Trial weekly oral medication - follow up if fails to resolve.  - fluconazole (DIFLUCAN) 150 MG tablet; Take 1 tablet (150 mg) by mouth once a week for 4 doses.          Patient Instructions   Return to clinic for labs to assess current fatigue symptoms.    I would recommend you follow [facility] with [facility] for colonoscopy.    For the foot rash - use fluconazole 150  mg one weekly for 4 weeks.    Keep feet dry as much as possible.      Subjective   Edin is a 69 year old, presenting for the following health issues:  Fatigue      9/12/2024     4:00 PM   Additional Questions   Roomed by Josette ROBERTS     Fatigue  Associated symptoms include fatigue.   History of Present Illness       Reason for visit:  Chronic fatigued  Symptom onset:  More than a month  Symptoms include:  Fatigued  Symptom intensity:  Moderate  Symptom progression:  Staying the same  Had these symptoms before:  Yes  Has tried/received treatment for these symptoms:  No   He is taking medications regularly.     Taking acetaminophen  Taking  energy pills - ginseng, green tea, other ingredients - thinks beneficial for symptoms.       Recurrent athlete's foot symptoms - did not fully resolve after the topical Nystatin.    Hyperlipidemia Follow-Up    Are you regularly taking any medication or supplement to lower your cholesterol?   Yes-   Are you having muscle aches or other side effects that you think could be caused by your cholesterol lowering medication?  No    Hypertension Follow-up    Do you check your blood pressure regularly outside of the clinic? Yes   Are you following a low salt diet? Yes  Are your blood pressures ever more than 140 on the top number (systolic) OR more   than 90 on the bottom number (diastolic), for example 140/90? No        How many servings of fruits and vegetables do you eat daily?  2-3  On average, how many sweetened beverages do you drink each day (Examples: soda, juice, sweet tea, etc.  Do NOT count diet or artificially sweetened beverages)?   1  How many days per week do you exercise enough to make your heart beat faster? 3 or less  How many minutes a day do you exercise enough to make your heart beat faster? 9 or less  How many days per week do you miss taking your medication? 0        Review of Systems  Constitutional, HEENT, cardiovascular, pulmonary, gi and gu systems are negative, except as otherwise noted.    Seeing chiropractor for 12 sessions.  Worsened back pain after session.  Couldn't move from the floor with that pain.   Pain the shoulder blade area episodically.  No current pain there.               Objective           Vitals:  No vitals were obtained today due to virtual visit.    Physical Exam   General: Alert and no distress //Respiratory: No audible wheeze, cough, or shortness of breath // Psychiatric:  Appropriate affect, tone, and pace of words            Phone call duration: 28 minutes  Signed Electronically by: Jessica Small MD

## 2024-09-12 NOTE — PROGRESS NOTES
Edin is a 69 year old who is being evaluated via a billable video visit.    What phone number would you like to be contacted at? [phone]   How would you like to obtain your AVS? Georget  {If patient encounters technical issues they should call [phone] :971223}    {PROVIDER CHARTING PREFERENCE:551382}    Subjective   Edin is a 69 year old, presenting for the following health issues:  Fatigue      9/12/2024     4:00 PM   Additional Questions   Roomed by Josette ROBERTS     Video Start Time: {video visit start/end time for provider to select:948489}    Pt struggling with energy and fatigued, He has talked about this before but he is still having the same issues.    Fatigue  Associated symptoms include fatigue.   History of Present Illness       Reason for visit:  Chronic fatigued  Symptom onset:  More than a month  Symptoms include:  Fatigued  Symptom intensity:  Moderate  Symptom progression:  Staying the same  Had these symptoms before:  Yes  Has tried/received treatment for these symptoms:  No   He is taking medications regularly.   Taking acetaminophen  Taking energy pills - ginseng, green tea, other ingredients - thinks beneficial for symptoms.       Recurrent athlete's foot symptoms - did not fully resolve after the   Hyperlipidemia Follow-Up    Are you regularly taking any medication or supplement to lower your cholesterol?   Yes-    Are you having muscle aches or other side effects that you think could be caused by your cholesterol lowering medication?  No    Hypertension Follow-up    Do you check your blood pressure regularly outside of the clinic? Yes   Are you following a low salt diet? Yes  Are your blood pressures ever more than 140 on the top number (systolic) OR more   than 90 on the bottom number (diastolic), for example 140/90? No        How many servings of fruits and vegetables do you eat daily?  2-3  On average, how many sweetened beverages do you drink each day (Examples: soda, juice,  "sweet tea, etc.  Do NOT count diet or artificially sweetened beverages)?   1  How many days per week do you exercise enough to make your heart beat faster? 3 or less  How many minutes a day do you exercise enough to make your heart beat faster? 9 or less  How many days per week do you miss taking your medication? 0  {additonal problems for provider to add (Optional):528528}    {ROS Picklists (Optional):736525}    Seeing chiropractor for 12 sessions.  Worsened back pain after session.    Couldn't move from the floor with that pain. Just prior to surgery  Pain the shoulder blade area.        Objective           Vitals:  No vitals were obtained today due to virtual visit.    Physical Exam   {video visit exam brief selected:735924}    {Diagnostic Test Results (Optional):976092}      Video-Visit Details    Type of service:  Video Visit   Video End Time:{video visit start/end time for provider to select:195589}  Originating Location (pt. Location): {video visit patient location:028236::\"Home\"}  {PROVIDER LOCATION On-site should be selected for visits conducted from your clinic location or adjoining [facility] hospital, academic office, or other nearby [facility] building. Off-site should be selected for all other provider locations, including home:221577}  Distant Location (provider location):  {virtual location provider:831145}  Platform used for Video Visit: {Virtual Visit Platforms:967974::\"AmWell\"}  Signed Electronically by: Jessica Small MD  {Email feedback regarding this note to [email or web address]   :898194}  "

## 2024-09-15 RX ORDER — ATORVASTATIN CALCIUM 20 MG/1
20 TABLET, FILM COATED ORAL EVERY MORNING
Qty: 90 TABLET | Refills: 3 | Status: SHIPPED | OUTPATIENT
Start: 2024-09-15

## 2024-09-27 ENCOUNTER — LAB (OUTPATIENT)
Dept: LAB | Facility: CLINIC | Age: 69
End: 2024-09-27
Payer: COMMERCIAL

## 2024-09-27 DIAGNOSIS — R53.82 CHRONIC FATIGUE: ICD-10-CM

## 2024-09-27 DIAGNOSIS — R79.89 LOW TSH LEVEL: ICD-10-CM

## 2024-09-27 LAB
ALBUMIN SERPL BCG-MCNC: 4.3 G/DL (ref 3.5–5.2)
ALP SERPL-CCNC: 87 U/L (ref 40–150)
ALT SERPL W P-5'-P-CCNC: 27 U/L (ref 0–70)
ANION GAP SERPL CALCULATED.3IONS-SCNC: 11 MMOL/L (ref 7–15)
AST SERPL W P-5'-P-CCNC: 34 U/L (ref 0–45)
BILIRUB SERPL-MCNC: 0.4 MG/DL
BUN SERPL-MCNC: 17.3 MG/DL (ref 8–23)
CALCIUM SERPL-MCNC: 9.9 MG/DL (ref 8.8–10.4)
CHLORIDE SERPL-SCNC: 106 MMOL/L (ref 98–107)
CREAT SERPL-MCNC: 1.09 MG/DL (ref 0.67–1.17)
EGFRCR SERPLBLD CKD-EPI 2021: 74 ML/MIN/1.73M2
ERYTHROCYTE [DISTWIDTH] IN BLOOD BY AUTOMATED COUNT: 14.5 % (ref 10–15)
ERYTHROCYTE [SEDIMENTATION RATE] IN BLOOD BY WESTERGREN METHOD: 6 MM/HR (ref 0–20)
GLUCOSE SERPL-MCNC: 143 MG/DL (ref 70–99)
HCO3 SERPL-SCNC: 26 MMOL/L (ref 22–29)
HCT VFR BLD AUTO: 44.4 % (ref 40–53)
HGB BLD-MCNC: 14.9 G/DL (ref 13.3–17.7)
MCH RBC QN AUTO: 28.8 PG (ref 26.5–33)
MCHC RBC AUTO-ENTMCNC: 33.6 G/DL (ref 31.5–36.5)
MCV RBC AUTO: 86 FL (ref 78–100)
PLATELET # BLD AUTO: 219 10E3/UL (ref 150–450)
POTASSIUM SERPL-SCNC: 3.6 MMOL/L (ref 3.4–5.3)
PROT SERPL-MCNC: 7.4 G/DL (ref 6.4–8.3)
RBC # BLD AUTO: 5.17 10E6/UL (ref 4.4–5.9)
SODIUM SERPL-SCNC: 143 MMOL/L (ref 135–145)
TSH SERPL DL<=0.005 MIU/L-ACNC: 0.3 UIU/ML (ref 0.3–4.2)
WBC # BLD AUTO: 9.9 10E3/UL (ref 4–11)

## 2024-09-27 PROCEDURE — 36415 COLL VENOUS BLD VENIPUNCTURE: CPT | Performed by: PATHOLOGY

## 2024-09-27 PROCEDURE — 83036 HEMOGLOBIN GLYCOSYLATED A1C: CPT | Mod: 93 | Performed by: FAMILY MEDICINE

## 2024-09-27 PROCEDURE — 85652 RBC SED RATE AUTOMATED: CPT | Performed by: PATHOLOGY

## 2024-09-27 PROCEDURE — 85027 COMPLETE CBC AUTOMATED: CPT | Performed by: PATHOLOGY

## 2024-09-27 PROCEDURE — 80053 COMPREHEN METABOLIC PANEL: CPT | Performed by: PATHOLOGY

## 2024-09-27 PROCEDURE — 84443 ASSAY THYROID STIM HORMONE: CPT | Performed by: PATHOLOGY

## 2024-09-30 LAB
EST. AVERAGE GLUCOSE BLD GHB EST-MCNC: 131 MG/DL
HBA1C MFR BLD: 6.2 %

## 2024-09-30 NOTE — RESULT ENCOUNTER NOTE
Your kidney function and liver testing are both normal.  Thyroid testing is normal.  Blood cell counts and evaluation for inflammation are normal as well.  Your blood sugar is borderline elevated.  I have requested another test to assess for blood sugar over the last 3 months.  I will send that result when available.  Please call or MyChart message me if you have any questions.      FAINAK

## 2024-10-01 NOTE — RESULT ENCOUNTER NOTE
"Your long-term blood sugar is borderline elevated.  This is in the \"prediabetic\" range.  Exercise and limiting carbohydrate and sugars in diet can be helpful at preventing progression to diabetes.   Please call or MyChart message me if you have any questions.      PSK"

## 2025-02-24 ENCOUNTER — PATIENT OUTREACH (OUTPATIENT)
Dept: GASTROENTEROLOGY | Facility: CLINIC | Age: 70
End: 2025-02-24
Payer: COMMERCIAL

## 2025-02-24 DIAGNOSIS — Z12.11 SPECIAL SCREENING FOR MALIGNANT NEOPLASMS, COLON: Primary | ICD-10-CM

## 2025-02-24 NOTE — PROGRESS NOTES
[facility] Screening Colonoscopy Referral Review    Patient meets the inclusion criteria for screening colonoscopy standing order.    Ordering/Referring Provider:  Jessica Small      BMI: Estimated body mass index is 29.7 kg/m  as calculated from the following:    Height as of 5/4/23: 1.829 m (6').    Weight as of 6/19/24: 99.3 kg (219 lb).     Sedation:  Does patient have any of the following conditions affecting sedation?  No medical conditions affecting sedation.    Previous Scopes:  Any previous recommendations or follow up needs based on previous scope?  na / No recommendations.    Medical Concerns to Postpone Order:  Does patient have any of the following medical concerns that should postpone/delay colonoscopy referral?  No medical conditions affecting colonoscopy referral.    Final Referral Details:  Based on patient's medical history patient is appropriate for referral order with moderate sedation. If patient's BMI > 50 do not schedule in [facility].

## 2025-03-06 DIAGNOSIS — I73.00 RAYNAUD'S DISEASE WITHOUT GANGRENE: ICD-10-CM

## 2025-03-06 DIAGNOSIS — I10 PRIMARY HYPERTENSION: ICD-10-CM

## 2025-03-06 RX ORDER — HYDROCHLOROTHIAZIDE 25 MG/1
TABLET ORAL
Qty: 90 TABLET | Refills: 0 | Status: SHIPPED | OUTPATIENT
Start: 2025-03-06

## 2025-03-06 RX ORDER — AMLODIPINE BESYLATE 10 MG/1
10 TABLET ORAL DAILY
Qty: 90 TABLET | Refills: 0 | Status: SHIPPED | OUTPATIENT
Start: 2025-03-06

## 2025-03-24 ENCOUNTER — TELEPHONE (OUTPATIENT)
Dept: GASTROENTEROLOGY | Facility: CLINIC | Age: 70
End: 2025-03-24
Payer: COMMERCIAL

## 2025-03-24 DIAGNOSIS — Z86.0100 HISTORY OF COLONIC POLYPS: ICD-10-CM

## 2025-03-24 DIAGNOSIS — Z12.11 ENCOUNTER FOR SCREENING COLONOSCOPY: Primary | ICD-10-CM

## 2025-03-24 DIAGNOSIS — Z90.49 S/P RIGHT HEMICOLECTOMY: ICD-10-CM

## 2025-03-24 RX ORDER — BISACODYL 5 MG/1
TABLET, DELAYED RELEASE ORAL
Qty: 4 TABLET | Refills: 0 | Status: SHIPPED | OUTPATIENT
Start: 2025-03-24

## 2025-03-24 NOTE — TELEPHONE ENCOUNTER
"Endoscopy Scheduling Screen    Have you had any respiratory illness or flu-like symptoms in the last 10 days?  No    What is your communication preference for Instructions and/or Bowel Prep?   MyChart    What insurance is in the chart?  Other:  BC[employer]    Ordering/Referring Provider: SAMUEL JARAMILLO   (If ordering provider performs procedure, schedule with ordering provider unless otherwise instructed. )    BMI: Estimated body mass index is 29.7 kg/m  as calculated from the following:    Height as of 5/4/23: 1.829 m (6').    Weight as of 6/19/24: 99.3 kg (219 lb).     Sedation Ordered  moderate sedation.   If patient BMI > 50 do not schedule in [facility].    If patient BMI > 45 do not schedule at [facility].    Are you taking methadone or Suboxone?  NO, No RN review required.    Have you been diagnosed and are being treated for severe PTSD or severe anxiety?  NO, No RN review required.    Are you taking any prescription medications for pain 3 or more times per week?   NO, No RN review required.    Do you have a history of malignant hyperthermia?  No    (Females) Are you currently pregnant?   No     Have you been diagnosed or told you have pulmonary hypertension?   No    Do you have an LVAD?  No    Have you been told you have moderate to severe sleep apnea?  Yes. Do you use a CPAP? No. (RN Review required for scheduling unless scheduling in Hospital.)     Have you been told you have COPD, asthma, or any other lung disease?  No    Has your doctor ordered any cardiac tests like echo, angiogram, stress test, ablation, or EKG, that you have not completed yet?  No    Do you  have a history of any heart conditions?  No     Have you ever had or are you waiting for an organ transplant?  No. Continue scheduling, no site restrictions.    Have you had a stroke or transient ischemic attack (TIA aka \"mini stroke\") in the last 2 years?   No.    Have you been diagnosed with or been told you have cirrhosis of the liver?   No.    Are you currently " on dialysis?   No    Do you need assistance transferring?   No    BMI: Estimated body mass index is 29.7 kg/m  as calculated from the following:    Height as of 5/4/23: 1.829 m (6').    Weight as of 6/19/24: 99.3 kg (219 lb).     Is patients BMI > 40 and scheduling location [facility]?  No    Do you take an injectable or oral medication for weight loss or diabetes (excluding insulin)?  No    Do you take the medication Naltrexone?  No    Do you take blood thinners?  No       Prep   Are you currently on dialysis or do you have chronic kidney disease?  No    Do you have a diagnosis of diabetes?  No    Do you have a diagnosis of cystic fibrosis (CF)?  No    On a regular basis do you go 3 -5 days between bowel movements?  No    BMI > 40?  No    Preferred Pharmacy:    [id number] [address] [address] [address]  Phone: [phone] Fax: [phone]    Final Scheduling Details     Procedure scheduled  Colonoscopy    Surgeon:  Kyle     Date of procedure:  4/8     Pre-OP / PAC:   No - Not required for this site.    Location  [facility] - Patient preference.    Sedation   Moderate Sedation - Per order.      Patient Reminders:   You will receive a call from a Nurse to review instructions and health history.  This assessment must be completed prior to your procedure.  Failure to complete the Nurse assessment may result in the procedure being cancelled.      On the day of your procedure, please designate an adult(s) who can drive you home stay with you for the next 24 hours. The medicines used in the exam will make you sleepy. You will not be able to drive.      You cannot take public transportation, ride share services, or non-medical taxi service without a responsible caregiver.  Medical transport services are allowed with the requirement that a responsible caregiver will receive you at your destination.  We require that drivers and caregivers are confirmed prior to  your procedure.

## 2025-03-24 NOTE — TELEPHONE ENCOUNTER
"Pre visit planning completed.      Procedure details:    Patient scheduled for Colonoscopy on 4/8/25.     Arrival time: 0845. Procedure time 0930    Facility location: [facility]; [address] [place] [address] [place] [address]. Check in location: [facility].     Sedation type: Conscious sedation - Of mention, previous procedure was completed under [facility] - ensure patient is aware that moderate/conscious sedation experience will differ from [facility].    Pre op exam needed? No.    Indication for procedure: Screening per order. Upon review noted patient is s/p right hemicolectomy 2/24/23 d/t colon polyp that could not be removed endoscopically.      Chart review:     Electronic implanted devices? No    Recent diagnosis of diverticulitis within the last 6 weeks? No      Medication review:    Diabetic? Prediabetic per A1C. Not on diabetic medications.    Anticoagulants? No    Weight loss medication/injectable? No GLP-1 medication per patient's medication list. Nursing to verify with pre-assessment call.    Other medication HOLDING recommendations:  If using Cannabis/Marijuana: Stop night before procedure.      Prep for procedure:     Bowel prep recommendation: Standard Golytely.   Bowel prep sent to [facility][id number] - [address]  Due to: Standard Golytely prep was used for the 1/6/23 colonoscopy which notes in report:  \"The total BBPS score equals 6. The quality of the bowel preparation was fair.\" Since then, patient had a right hemicolectomy 2/24/23 d/t colon polyp that could not be removed endoscopically.  Considering that removal of the colon, the Standard Golytely prep should be effective this time. Would recommend proceeding with the Standard Golytely prep again (Miralax prep likely not adequate volume d/t hx suboptimal prep).    Procedure information and instructions sent via Aternitysascha Levy RN  Endoscopy Procedure Pre Assessment   [phone] option 3  "

## 2025-03-25 NOTE — TELEPHONE ENCOUNTER
Attempted to contact patient in order to complete pre assessment questions.     No answer. Left message to return call to [phone] option 3    Callback communication sent via [email or web address].      Keyonna Montanez RN  Endoscopy Procedure Pre Assessment

## 2025-03-30 SDOH — HEALTH STABILITY: PHYSICAL HEALTH: ON AVERAGE, HOW MANY MINUTES DO YOU ENGAGE IN EXERCISE AT THIS LEVEL?: 150+ MIN

## 2025-03-30 SDOH — HEALTH STABILITY: PHYSICAL HEALTH: ON AVERAGE, HOW MANY DAYS PER WEEK DO YOU ENGAGE IN MODERATE TO STRENUOUS EXERCISE (LIKE A BRISK WALK)?: 6 DAYS

## 2025-03-30 ASSESSMENT — PATIENT HEALTH QUESTIONNAIRE - PHQ9
10. IF YOU CHECKED OFF ANY PROBLEMS, HOW DIFFICULT HAVE THESE PROBLEMS MADE IT FOR YOU TO DO YOUR WORK, TAKE CARE OF THINGS AT HOME, OR GET ALONG WITH OTHER PEOPLE: SOMEWHAT DIFFICULT
SUM OF ALL RESPONSES TO PHQ QUESTIONS 1-9: 5
SUM OF ALL RESPONSES TO PHQ QUESTIONS 1-9: 5

## 2025-03-30 ASSESSMENT — SOCIAL DETERMINANTS OF HEALTH (SDOH): HOW OFTEN DO YOU GET TOGETHER WITH FRIENDS OR RELATIVES?: NEVER

## 2025-03-31 ENCOUNTER — VIRTUAL VISIT (OUTPATIENT)
Dept: FAMILY MEDICINE | Facility: CLINIC | Age: 70
End: 2025-03-31
Payer: COMMERCIAL

## 2025-03-31 DIAGNOSIS — R21 RASH OF FOOT: Primary | ICD-10-CM

## 2025-03-31 DIAGNOSIS — I10 PRIMARY HYPERTENSION: ICD-10-CM

## 2025-03-31 DIAGNOSIS — C18.2 MALIGNANT NEOPLASM OF ASCENDING COLON (H): ICD-10-CM

## 2025-03-31 DIAGNOSIS — F33.1 MODERATE RECURRENT MAJOR DEPRESSION (H): ICD-10-CM

## 2025-03-31 PROCEDURE — 98006 SYNCH AUDIO-VIDEO EST MOD 30: CPT | Performed by: FAMILY MEDICINE

## 2025-03-31 RX ORDER — TRIAMCINOLONE ACETONIDE 0.25 MG/G
OINTMENT TOPICAL 2 TIMES DAILY
Qty: 15 G | Refills: 0 | Status: SHIPPED | OUTPATIENT
Start: 2025-03-31 | End: 2025-04-03

## 2025-03-31 NOTE — PATIENT INSTRUCTIONS
Continue current BP medications.  Bring the BP cuff with you to check accuracy at your upcoming appointment.    For the toe rash - use triamcinolone topically to area twice a day  -- we will reevaluate area when you are in for your annual later this week.  Do not wear socks to bed.

## 2025-03-31 NOTE — PROGRESS NOTES
"  If patient has telephone visit, have they been educated on video visit as preferred visit method and offered to change to video visit? NOT APPLICABLE        Instructions Relayed to Patient by Virtual Roomer:     Patient is active on [email or web address]:   Relayed following to patient: \"It looks like you are active on [email or web address], are you able to join the visit this way? If not, do you need us to send you a link now or would you like your provider to send a link via text or email when they are ready to initiate the visit?\"      Patient Confirmed they will join visit via: [employer]  Reminded patient to ensure they were logged on to virtual visit by arrival time listed.   Asked if patient has flexibility to initiate visit sooner than arrival time: patient stated yes, documented in appointment notes availability to initiate visit earlier than arrival time     If pediatric virtual visit, ensured pediatric patient along with parent/guardian will be present for video visit.     Patient offered the website [email or web address] and/or phone number to [email or web address] Help line: [phone]      Answers submitted by the patient for this visit:  Patient Health Questionnaire (Submitted on 3/30/2025)  If you checked off any problems, how difficult have these problems made it for you to do your work, take care of things at home, or get along with other people?: Somewhat difficult  PHQ9 TOTAL SCORE: 5  Hypertension Visit (Submitted on 3/26/2025)  Chief Complaint: Chronic problems general questions HPI Form  Do you check your blood pressure regularly outside of the clinic?: Yes  Are your blood pressures ever more than 140 on the top number (systolic) OR more than 90 on the bottom number (diastolic)? (For example, greater than 140/90): Yes  Are you following a low salt diet?: Yes  General Questionnaire (Submitted on 3/26/2025)  Chief Complaint: Chronic problems general questions HPI Form  What is the reason for your visit today? : Follow up " and a continuing issue with a toe fungus.  How many servings of fruits and vegetables do you eat daily?: 2-3  On average, how many sweetened beverages do you drink each day (Examples: soda, juice, sweet tea, etc.  Do NOT count diet or artificially sweetened beverages)?: 2  How many minutes a day do you exercise enough to make your heart beat faster?: 60 or more  How many days a week do you exercise enough to make your heart beat faster?: 5  How many days per week do you miss taking your medication?: 1  What makes it hard for you to take your medication every day?: remembering to take  Questionnaire about: Chronic problems general questions HPI Form (Submitted on 3/26/2025)  Chief Complaint: Chronic problems general questions HPI Form  Edin is a 69 year old who is being evaluated via a billable video visit.    How would you like to obtain your AVS? MyChart  If the video visit is dropped, the invitation should be resent by: Text to cell phone: [phone]  Will anyone else be joining your video visit? No      Assessment & Plan     Rash of foot  Antifungal treatment has not been effective at resolving issue.  Trial steroid ointment for the next 3 days until follow up in person appointment for wellness exam.  Will reassess area at that time.    - triamcinolone (KENALOG) 0.025 % external ointment; Apply topically 2 times daily. To toe irritation left foot    Primary hypertension  Home BP well controlled.  Continues on Amlodipine and hydrochlorothiazide.   Lab testing planned later this week.      Moderate recurrent major depression (H)  Mood symptoms well controlled.  Continue to monitor symptoms.  Treated with citalopram in past.       Malignant neoplasm of ascending colon (H)  S/P colectomy.  Follow up colonoscopy scheduled for April.              Patient Instructions   Continue current BP medications.  Bring the BP cuff with you to check accuracy at your upcoming appointment.    For the toe rash - use  triamcinolone topically to area twice a day  -- we will reevaluate area when you are in for your annual later this week.  Do not wear socks to bed.      The longitudinal plan of care for the diagnosis(es)/condition(s) as documented were addressed during this visit. Due to the added complexity in care, I will continue to support Edin in the subsequent management and with ongoing continuity of care.          Subjective   Edin is a 69 year old, presenting for the following health issues:  Hypertension and Foot Problems        3/31/2025     3:54 PM   Additional Questions   Roomed by Jo Ann   Accompanied by self       Video Start Time:  6:15 PM    History of Present Illness       Hypertension: He presents for follow up of hypertension.  He does check blood pressure  regularly outside of the clinic. Outside blood pressures have been over 140/90. He follows a low salt diet.     Reason for visit:  Follow up and a continuing issue with a toe fungus.    He eats 2-3 servings of fruits and vegetables daily.He consumes 2 sweetened beverage(s) daily.He exercises with enough effort to increase his heart rate 60 or more minutes per day.  He exercises with enough effort to increase his heart rate 5 days per week. He is missing 1 dose(s) of medications per week.  He is not taking prescribed medications regularly due to remembering to take.      BP at home:  122/80, HR 59      Toe - irritation left foot Between 4th and 5th toes  Has used multiple treatments - mycostatin Powder to foot, tried peroxide, apple cider vinegar - stinging a little.  Foot doctor - told him to keep dry.    No help with Diflucan orally.    Painful, only mildly itchy.  No drainage  or pus like crusting described.    Depression   How are you doing with your depression since your last visit? No change  Are you having other symptoms that might be associated with depression? No  Have you had a significant life event?  No   Are you feeling anxious or having  panic attacks?   No  Do you have any concerns with your use of alcohol or other drugs? No    Social History     Tobacco Use    Smoking status: Former     Current packs/day: 0.00     Types: Cigarettes     Quit date: 8/1/2021     Years since quitting: 3.6    Smokeless tobacco: Never   Vaping Use    Vaping status: Never Used   Substance Use Topics    Alcohol use: Yes     Comment: beer per day, on average    Drug use: Not Currently     Types: Marijuana     Comment: smoking prn         5/2/2024     7:45 PM 5/22/2024     9:06 AM 3/30/2025     1:59 PM   PHQ   PHQ-9 Total Score 9 2 5    Q9: Thoughts of better off dead/self-harm past 2 weeks Not at all Not at all Not at all       Patient-reported         8/1/2022     5:53 PM   LUTHER-7 SCORE   Total Score 6         Suicide Assessment Five-step Evaluation and Treatment (SAFE-T)      Colon cancer - s/p right colectomy.  Negative (0/26) lymph nodes.    Has upcoming colonoscopy scheduled.          Review of Systems  Constitutional, HEENT, cardiovascular, pulmonary, gi and gu systems are negative, except as otherwise noted.      Objective           Vitals:  No vitals were obtained today due to virtual visit.    Physical Exam   GENERAL: alert and no distress  EYES: Eyes grossly normal to inspection.  No discharge or erythema, or obvious scleral/conjunctival abnormalities.  RESP: No audible wheeze, cough, or visible cyanosis.    SKIN: Visible skin clear. No significant rash, abnormal pigmentation or lesions.  NEURO: Cranial nerves grossly intact.  Mentation and speech appropriate for age.  PSYCH: Appropriate affect, tone, and pace of words          Video-Visit Details    Type of service:  Video Visit   Video End Time:6:25 PM  Originating Location (pt. Location): Home    Distant Location (provider location):  On-site  Platform used for Video Visit: Ottoniel  Signed Electronically by: Jessica Small MD        This chart was documented by provider using a voice activated software  called Dragon in addition to manual typing. There may be vocabulary errors or other grammatical errors due to this.

## 2025-04-02 ASSESSMENT — PATIENT HEALTH QUESTIONNAIRE - PHQ9
10. IF YOU CHECKED OFF ANY PROBLEMS, HOW DIFFICULT HAVE THESE PROBLEMS MADE IT FOR YOU TO DO YOUR WORK, TAKE CARE OF THINGS AT HOME, OR GET ALONG WITH OTHER PEOPLE: NOT DIFFICULT AT ALL
SUM OF ALL RESPONSES TO PHQ QUESTIONS 1-9: 3
SUM OF ALL RESPONSES TO PHQ QUESTIONS 1-9: 3

## 2025-04-03 ENCOUNTER — ANCILLARY PROCEDURE (OUTPATIENT)
Dept: GENERAL RADIOLOGY | Facility: CLINIC | Age: 70
End: 2025-04-03
Attending: FAMILY MEDICINE
Payer: COMMERCIAL

## 2025-04-03 ENCOUNTER — OFFICE VISIT (OUTPATIENT)
Dept: FAMILY MEDICINE | Facility: CLINIC | Age: 70
End: 2025-04-03
Payer: COMMERCIAL

## 2025-04-03 VITALS
HEIGHT: 72 IN | SYSTOLIC BLOOD PRESSURE: 134 MMHG | OXYGEN SATURATION: 97 % | DIASTOLIC BLOOD PRESSURE: 89 MMHG | HEART RATE: 68 BPM | TEMPERATURE: 98.2 F | BODY MASS INDEX: 30.41 KG/M2 | WEIGHT: 224.5 LBS

## 2025-04-03 DIAGNOSIS — G47.33 OSA (OBSTRUCTIVE SLEEP APNEA): ICD-10-CM

## 2025-04-03 DIAGNOSIS — I10 PRIMARY HYPERTENSION: ICD-10-CM

## 2025-04-03 DIAGNOSIS — R79.89 LOW TSH LEVEL: ICD-10-CM

## 2025-04-03 DIAGNOSIS — M79.672 LEFT FOOT PAIN: ICD-10-CM

## 2025-04-03 DIAGNOSIS — R21 RASH OF FOOT: ICD-10-CM

## 2025-04-03 DIAGNOSIS — Z00.00 ENCOUNTER FOR MEDICARE ANNUAL WELLNESS EXAM: Primary | ICD-10-CM

## 2025-04-03 DIAGNOSIS — R73.9 HYPERGLYCEMIA: ICD-10-CM

## 2025-04-03 DIAGNOSIS — E78.5 HYPERLIPIDEMIA, UNSPECIFIED HYPERLIPIDEMIA TYPE: ICD-10-CM

## 2025-04-03 DIAGNOSIS — Z12.5 SCREENING FOR PROSTATE CANCER: ICD-10-CM

## 2025-04-03 DIAGNOSIS — I73.00 RAYNAUD'S DISEASE WITHOUT GANGRENE: ICD-10-CM

## 2025-04-03 LAB
EST. AVERAGE GLUCOSE BLD GHB EST-MCNC: 120 MG/DL
HBA1C MFR BLD: 5.8 % (ref 0–5.6)

## 2025-04-03 PROCEDURE — G2211 COMPLEX E/M VISIT ADD ON: HCPCS | Performed by: FAMILY MEDICINE

## 2025-04-03 PROCEDURE — 3075F SYST BP GE 130 - 139MM HG: CPT | Performed by: FAMILY MEDICINE

## 2025-04-03 PROCEDURE — 3079F DIAST BP 80-89 MM HG: CPT | Performed by: FAMILY MEDICINE

## 2025-04-03 PROCEDURE — G0103 PSA SCREENING: HCPCS | Performed by: FAMILY MEDICINE

## 2025-04-03 PROCEDURE — 99214 OFFICE O/P EST MOD 30 MIN: CPT | Mod: 25 | Performed by: FAMILY MEDICINE

## 2025-04-03 PROCEDURE — 80053 COMPREHEN METABOLIC PANEL: CPT | Performed by: FAMILY MEDICINE

## 2025-04-03 PROCEDURE — G0439 PPPS, SUBSEQ VISIT: HCPCS | Performed by: FAMILY MEDICINE

## 2025-04-03 PROCEDURE — 83036 HEMOGLOBIN GLYCOSYLATED A1C: CPT | Performed by: FAMILY MEDICINE

## 2025-04-03 PROCEDURE — 36415 COLL VENOUS BLD VENIPUNCTURE: CPT | Performed by: FAMILY MEDICINE

## 2025-04-03 PROCEDURE — 82043 UR ALBUMIN QUANTITATIVE: CPT | Performed by: FAMILY MEDICINE

## 2025-04-03 PROCEDURE — 82570 ASSAY OF URINE CREATININE: CPT | Performed by: FAMILY MEDICINE

## 2025-04-03 PROCEDURE — 80061 LIPID PANEL: CPT | Performed by: FAMILY MEDICINE

## 2025-04-03 RX ORDER — AMLODIPINE BESYLATE 10 MG/1
10 TABLET ORAL DAILY
Qty: 90 TABLET | Refills: 0 | Status: CANCELLED | OUTPATIENT
Start: 2025-04-03

## 2025-04-03 RX ORDER — TRIAMCINOLONE ACETONIDE 0.25 MG/G
OINTMENT TOPICAL 2 TIMES DAILY
Qty: 15 G | Refills: 0 | Status: SHIPPED | OUTPATIENT
Start: 2025-04-03

## 2025-04-03 RX ORDER — HYDROCHLOROTHIAZIDE 25 MG/1
25 TABLET ORAL DAILY
Qty: 90 TABLET | Refills: 0 | Status: CANCELLED | OUTPATIENT
Start: 2025-04-03

## 2025-04-03 NOTE — PROGRESS NOTES
Preventive Care Visit  [facility]  Jessica Small MD, Family Medicine  Apr 3, 2025  {Provider  Link to [facility] :864658}    {PROVIDER CHARTING PREFERENCE:479201}    Paramjit Jesus is a 69 year old, presenting for the following:  Annual Visit        4/3/2025     4:05 PM   Additional Questions   Roomed by pAril FRANKS   Accompanied by Self         4/3/2025     4:05 PM   Patient Reported Additional Medications   Patient reports taking the following new medications None         HPI       Hyperlipidemia Follow-Up    Are you regularly taking any medication or supplement to lower your cholesterol?   { :138772}  Are you having muscle aches or other side effects that you think could be caused by your cholesterol lowering medication?  { :015452}    Hypertension Follow-up    Do you check your blood pressure regularly outside of the clinic? { :800071}   Are you following a low salt diet? { :461245}  Are your blood pressures ever more than 140 on the top number (systolic) OR more   than 90 on the bottom number (diastolic), for example 140/90? { :119073}    BP Readings from Last 2 Encounters:   04/03/25 134/89   07/31/24 121/66       Advance Care Planning  Patient has a Health Care Directive on file  Advance care planning document is on file and is current.      3/30/2025   General Health   How would you rate your overall physical health? Good   Feel stress (tense, anxious, or unable to sleep) To some extent   (!) STRESS CONCERN      3/30/2025   Nutrition   Diet: Regular (no restrictions)         3/30/2025   Exercise   Days per week of moderate/strenous exercise 6 days   Average minutes spent exercising at this level 150+ min   Walking        3/30/2025   Social Factors   Frequency of gathering with friends or relatives Never   Worry food won't last until get money to buy more Yes   Food not last or not have enough money for food? No   Do you have housing? (Housing is defined as stable permanent  housing and does not include staying ouside in a car, in a tent, in an abandoned building, in an overnight shelter, or couch-surfing.) Yes   Are you worried about losing your housing? No   Lack of transportation? No   Unable to get utilities (heat,electricity)? No   (!) FOOD SECURITY CONCERN PRESENT(!) SOCIAL CONNECTIONS CONCERN      3/30/2025   Fall Risk   Fallen 2 or more times in the past year? No   Trouble with walking or balance? No          3/30/2025   Activities of Daily Living- Home Safety   Needs help with the following daily activites None of the above   Safety concerns in the home None of the above         3/30/2025   Dental   Dentist two times every year? (!) NO         3/30/2025   Hearing Screening   Hearing concerns? (!) I NEED TO ASK PEOPLE TO SPEAK UP OR REPEAT THEMSELVES.         3/30/2025   Driving Risk Screening   Patient/family members have concerns about driving No         3/30/2025   General Alertness/Fatigue Screening   Have you been more tired than usual lately? (!) YES         3/30/2025   Urinary Incontinence Screening   Bothered by leaking urine in past 6 months Yes     Nocturia, urgency    Today's PHQ-9 Score:       4/2/2025     6:28 PM   PHQ-9 SCORE   PHQ-9 Total Score MyChart 3 (Minimal depression)   PHQ-9 Total Score 3        Patient-reported         3/30/2025   Substance Use   Alcohol more than 3/day or more than 7/wk No   Do you have a current opioid prescription? No   How severe/bad is pain from 1 to 10? 7/10   Do you use any other substances recreationally? No    (!) CANNABIS PRODUCTS       Multiple values from one day are sorted in reverse-chronological order     Social History     Tobacco Use    Smoking status: Former     Current packs/day: 0.00     Types: Cigarettes     Quit date: 8/1/2021     Years since quitting: 3.6    Smokeless tobacco: Never   Vaping Use    Vaping status: Never Used   Substance Use Topics    Alcohol use: Yes     Comment: beer per day, on average    Drug use:  Not Currently     Types: Marijuana     Comment: smoking prn       Last PSA:   Prostate Specific Antigen Screen   Date Value Ref Range Status   12/05/2022 2.35 0.00 - 4.00 ug/L Final     ASCVD Risk   The 10-year ASCVD risk score (Mariaelena DOCKERY, et al., 2019) is: 17%    Values used to calculate the score:      Age: 69 years      Sex: Male      Is Non- : Yes      Diabetic: No      Tobacco smoker: No      Systolic Blood Pressure: 134 mmHg      Is BP treated: Yes      HDL Cholesterol: 51 mg/dL      Total Cholesterol: 145 mg/dL          Reviewed and updated as needed this visit by Provider   Tobacco  Allergies  Meds   Med Hx  Surg Hx  Fam Hx            Past Medical History:   Diagnosis Date    Colon cancer (H) 3/8/2023    Depressive disorder 1995    Hypertension      Past Surgical History:   Procedure Laterality Date    CHEILECTOMY Left 06/04/2024    Procedure: CHEILECTOMY/ bone spur removal and synovectomy;  Surgeon: Doni Ivan DPM;  Location: [facility] OR    COLECTOMY RIGHT  03/08/2023    laparoscopic, terminal ileum, appendix, ascending colon and proximal transverse colon    COLONOSCOPY N/A 01/06/2023    Procedure: COLONOSCOPY, WITH POLYPECTOMY AND BIOPSY;  Surgeon: Liborio Tucker MD;  Location: [facility] OR    COLONOSCOPY      EYE SURGERY      HERNIORRHAPHY UMBILICAL N/A 03/08/2023    Procedure: Herniorrhaphy umbilical Repair;  Surgeon: Nir Escobar MD;  Location: [facility] OR     BP Readings from Last 3 Encounters:   04/03/25 134/89   07/31/24 121/66   06/19/24 136/82    Wt Readings from Last 3 Encounters:   04/03/25 101.8 kg (224 lb 8 oz)   06/19/24 99.3 kg (219 lb)   06/04/24 99.3 kg (219 lb)                  Current providers sharing in care for this patient include:  Patient Care Team:  Jessica Small MD as PCP - General (Family Medicine)  Gerardo Ahmadi MD as MD  Doni Ivan DPM as Assigned Surgical Provider  Jessica Small MD as Assigned PCP    The following  health maintenance items are reviewed in Epic and correct as of today:  Health Maintenance   Topic Date Due    DEPRESSION ACTION PLAN  Never done    MEDICARE ANNUAL WELLNESS VISIT  12/05/2023    COLORECTAL CANCER SCREENING  01/06/2024    COVID-19 Vaccine (6 - 2024-25 season) 09/01/2024    LIPID  05/22/2025    BMP  09/27/2025    PHQ-9  10/03/2025    ANNUAL REVIEW OF HM ORDERS  03/31/2026    FALL RISK ASSESSMENT  04/03/2026    DIABETES SCREENING  09/27/2027    ADVANCE CARE PLANNING  03/31/2028    DTAP/TDAP/TD IMMUNIZATION (4 - Td or Tdap) 02/01/2032    HEPATITIS C SCREENING  Completed    INFLUENZA VACCINE  Completed    Pneumococcal Vaccine: 50+ Years  Completed    ZOSTER IMMUNIZATION  Completed    RSV VACCINE  Completed    AORTIC ANEURYSM SCREENING (SYSTEM ASSIGNED)  Completed    HPV IMMUNIZATION  Aged Out    MENINGITIS IMMUNIZATION  Aged Out    LUNG CANCER SCREENING  Discontinued       {ROS Picklists (Optional):725946}   Head injury previously.  Memory issues mild.  No headache.   10 AM struggles to stay awake.  Night sleeping - 6-7 pm, up 3-4 am.  Has CPAP machine but not using for a long time.       Objective    Exam  /89 (BP Location: Right arm, Patient Position: Sitting, Cuff Size: Adult Regular)   Pulse 68   Temp 98.2  F (36.8  C) (Oral)   Ht 1.829 m (6')   Wt 101.8 kg (224 lb 8 oz)   SpO2 97%   BMI 30.45 kg/m     Estimated body mass index is 30.45 kg/m  as calculated from the following:    Height as of this encounter: 1.829 m (6').    Weight as of this encounter: 101.8 kg (224 lb 8 oz).    Physical Exam  {Exam Choices (Optional):964430}  {Provider  The Mini-Cog is incomplete, use link to complete and refresh note Link to Mini-Cog :325704}      4/3/2025   Mini Cog   3 Item Recall 1 object recalled     {A Mini-Cog total score of 0-2 suggests the possibility of dementia, score of 3-5 suggests no dementia:212010}         Signed Electronically by: Jessica Small MD  {Email feedback regarding this  note to [email or web address]   :381025}  Answers submitted by the patient for this visit:  Patient Health Questionnaire (Submitted on 4/2/2025)  If you checked off any problems, how difficult have these problems made it for you to do your work, take care of things at home, or get along with other people?: Not difficult at all  PHQ9 TOTAL SCORE: 3     UMBILICAL N/A 03/08/2023    Procedure: Herniorrhaphy umbilical Repair;  Surgeon: Nir Escobar MD;  Location: [facility] OR     BP Readings from Last 3 Encounters:   04/03/25 134/89   07/31/24 121/66   06/19/24 136/82    Wt Readings from Last 3 Encounters:   04/03/25 101.8 kg (224 lb 8 oz)   06/19/24 99.3 kg (219 lb)   06/04/24 99.3 kg (219 lb)                  Current providers sharing in care for this patient include:  Patient Care Team:  Jessica Small MD as PCP - General (Family Medicine)  Gerardo Ahmadi MD as MD  Moncho, Doni Mccall DPM as Assigned Surgical Provider  Jessica Small MD as Assigned PCP    The following health maintenance items are reviewed in Epic and correct as of today:  Health Maintenance   Topic Date Due    DEPRESSION ACTION PLAN  Never done    COLORECTAL CANCER SCREENING  01/06/2024    COVID-19 Vaccine (7 - 2024-25 season) 07/20/2025    PHQ-9  10/03/2025    ANNUAL REVIEW OF HM ORDERS  03/31/2026    MEDICARE ANNUAL WELLNESS VISIT  04/03/2026    BMP  04/03/2026    LIPID  04/03/2026    FALL RISK ASSESSMENT  04/03/2026    DIABETES SCREENING  04/03/2028    ADVANCE CARE PLANNING  04/03/2030    DTAP/TDAP/TD IMMUNIZATION (4 - Td or Tdap) 02/01/2032    HEPATITIS C SCREENING  Completed    INFLUENZA VACCINE  Completed    Pneumococcal Vaccine: 50+ Years  Completed    ZOSTER IMMUNIZATION  Completed    RSV VACCINE  Completed    AORTIC ANEURYSM SCREENING (SYSTEM ASSIGNED)  Completed    HPV IMMUNIZATION  Aged Out    MENINGITIS IMMUNIZATION  Aged Out    LUNG CANCER SCREENING  Discontinued         Review of Systems  Constitutional, HEENT, cardiovascular, pulmonary, GI, , musculoskeletal, neuro, skin, endocrine and psych systems are negative, except as otherwise noted.   History of head injury previously.  Memory issues mild.  No headache.   10 AM struggles to stay awake.  Night sleeping - 6-7 pm, up 3-4 am.  Has CPAP machine but not using for a long time.       Objective    Exam  /89 (BP Location:  Right arm, Patient Position: Sitting, Cuff Size: Adult Regular)   Pulse 68   Temp 98.2  F (36.8  C) (Oral)   Ht 1.829 m (6')   Wt 101.8 kg (224 lb 8 oz)   SpO2 97%   BMI 30.45 kg/m     Estimated body mass index is 30.45 kg/m  as calculated from the following:    Height as of this encounter: 1.829 m (6').    Weight as of this encounter: 101.8 kg (224 lb 8 oz).    Physical Exam  GENERAL: alert and no distress  EYES: Eyes grossly normal to inspection, PERRL and conjunctivae and sclerae normal  HENT: normal cephalic/atraumatic, ear canals and TM's normal, nose and mouth without ulcers or lesions, nasal mucosa edematous , oropharynx clear, and oral mucous membranes moist  NECK: no adenopathy, no asymmetry, masses, or scars  RESP: lungs clear to auscultation - no rales, rhonchi or wheezes  CV: regular rate and rhythm, normal S1 S2, no S3 or S4, no murmur, click or rub, no peripheral edema  ABDOMEN: soft, nontender, no hepatosplenomegaly, no masses and bowel sounds normal  MS: tenderness to palpation dorsal aspect of the left foot at the 1st MT joint primarily   SKIN: no suspicious lesions or rashes.  Cracking of skin on the left foot between the 4th and 5th toes.  No active drainage, erythema, ulceration noted on exam.  NEURO: Normal strength and tone, mentation intact and speech normal  PSYCH: mentation appears normal, affect normal/bright        4/3/2025   Mini Cog   3 Item Recall 1 object recalled              Signed Electronically by: Jessica Small MD    Answers submitted by the patient for this visit:  Patient Health Questionnaire (Submitted on 4/2/2025)  If you checked off any problems, how difficult have these problems made it for you to do your work, take care of things at home, or get along with other people?: Not difficult at all  PHQ9 TOTAL SCORE: 3        This chart was documented by provider using a voice activated software called Dragon in addition to manual typing. There may be vocabulary errors  or other grammatical errors due to this.

## 2025-04-03 NOTE — PATIENT INSTRUCTIONS
Labs today.  I will send results and recommendations when available.    Request refills when needed since you have a good supply at home now.    Xray foot today. - I will send final xray report when available.      Follow up for colonoscopy as scheduled.    I have placed a referral to sleep clinic for evaluation and update on your CPAP machine.  Someone will contact you to set up an appointment.  Untreated sleep apnea may be causing the fatigue symptoms.    For the ear symptoms, I would recommend use of nasal spray daily for 3-4 weeks to see if that relieves the symptoms in the right ear.  Follow up if worsening or persistent symptoms are noted.            Patient Education   Preventive Care Advice   This is general advice given by our system to help you stay healthy. However, your care team may have specific advice just for you. Please talk to your care team about your preventive care needs.  Nutrition  Eat 5 or more servings of fruits and vegetables each day.  Try wheat bread, brown rice and whole grain pasta (instead of white bread, rice, and pasta).  Get enough calcium and vitamin D. Check the label on foods and aim for 100% of the RDA (recommended daily allowance).  Lifestyle  Exercise at least 150 minutes each week  (30 minutes a day, 5 days a week).  Do muscle strengthening activities 2 days a week. These help control your weight and prevent disease.  No smoking.  Wear sunscreen to prevent skin cancer.  Have a dental exam and cleaning every 6 months.  Yearly exams  See your health care team every year to talk about:  Any changes in your health.  Any medicines your care team has prescribed.  Preventive care, family planning, and ways to prevent chronic diseases.  Shots (vaccines)   HPV shots (up to age 26), if you've never had them before.  Hepatitis B shots (up to age 59), if you've never had them before.  COVID-19 shot: Get this shot when it's due.  Flu shot: Get a flu shot every year.  Tetanus shot: Get a  tetanus shot every 10 years.  Pneumococcal, hepatitis A, and RSV shots: Ask your care team if you need these based on your risk.  Shingles shot (for age 50 and up)  General health tests  Diabetes screening:  Starting at age 35, Get screened for diabetes at least every 3 years.  If you are younger than age 35, ask your care team if you should be screened for diabetes.  Cholesterol test: At age 39, start having a cholesterol test every 5 years, or more often if advised.  Bone density scan (DEXA): At age 50, ask your care team if you should have this scan for osteoporosis (brittle bones).  Hepatitis C: Get tested at least once in your life.  STIs (sexually transmitted infections)  Before age 24: Ask your care team if you should be screened for STIs.  After age 24: Get screened for STIs if you're at risk. You are at risk for STIs (including HIV) if:  You are sexually active with more than one person.  You don't use condoms every time.  You or a partner was diagnosed with a sexually transmitted infection.  If you are at risk for HIV, ask about PrEP medicine to prevent HIV.  Get tested for HIV at least once in your life, whether you are at risk for HIV or not.  Cancer screening tests  Cervical cancer screening: If you have a cervix, begin getting regular cervical cancer screening tests starting at age 21.  Breast cancer scan (mammogram): If you've ever had breasts, begin having regular mammograms starting at age 40. This is a scan to check for breast cancer.  Colon cancer screening: It is important to start screening for colon cancer at age 45.  Have a colonoscopy test every 10 years (or more often if you're at risk) Or, ask your provider about stool tests like a FIT test every year or Cologuard test every 3 years.  To learn more about your testing options, visit:   .  For help making a decision, visit:   https://bit.ly/mw14094.  Prostate cancer screening test: If you have a prostate, ask your care team if a prostate  cancer screening test (PSA) at age 55 is right for you.  Lung cancer screening: If you are a current or former smoker ages 50 to 80, ask your care team if ongoing lung cancer screenings are right for you.  For informational purposes only. Not to replace the advice of your health care provider. Copyright   2023 [employer] All rights reserved. Clinically reviewed by the [facility] Transitions Program. [employer] [id number] - REV 01/24.  Hearing Loss: Care Instructions  Overview     Hearing loss is a sudden or slow decrease in how well you hear. It can range from slight to profound. Permanent hearing loss can occur with aging. It also can happen when you are exposed long-term to loud noise. Examples include listening to loud music, riding motorcycles, or being around other loud machines.  Hearing loss can affect your work and home life. It can make you feel lonely or depressed. You may feel that you have lost your independence. But hearing aids and other devices can help you hear better and feel connected to others.  Follow-up care is a key part of your treatment and safety. Be sure to make and go to all appointments, and call your doctor if you are having problems. It's also a good idea to know your test results and keep a list of the medicines you take.  How can you care for yourself at home?  Avoid loud noises whenever possible. This helps keep your hearing from getting worse.  Always wear hearing protection around loud noises.  Wear a hearing aid as directed.  A professional can help you pick a hearing aid that will work best for you.  You can also get hearing aids over the counter for mild to moderate hearing loss.  Have hearing tests as your doctor suggests. They can show whether your hearing has changed. Your hearing aid may need to be adjusted.  Use other devices as needed. These may include:  Telephone amplifiers and hearing aids that can connect to a television, stereo, radio, or  "microphone.  Devices that use lights or vibrations. These alert you to the doorbell, a ringing telephone, or a baby monitor.  Television closed-captioning. This shows the words at the bottom of the screen. Most new TVs can do this.  TTY (text telephone). This lets you type messages back and forth on the telephone instead of talking or listening. These devices are also called TDD. When messages are typed on the keyboard, they are sent over the phone line to a receiving TTY. The message is shown on a monitor.  Use text messaging, social media, and email if it is hard for you to communicate by telephone.  Try to learn a listening technique called speechreading. It is not lipreading. You pay attention to people's gestures, expressions, posture, and tone of voice. These clues can help you understand what a person is saying. Face the person you are talking to, and have them face you. Make sure the lighting is good. You need to see the other person's face clearly.  Think about counseling if you need help to adjust to your hearing loss.  When should you call for help?  Watch closely for changes in your health, and be sure to contact your doctor if:    You think your hearing is getting worse.     You have new symptoms, such as dizziness or nausea.   Where can you learn more?  Go to https://www.[employer].net/patiented  Enter R798 in the search box to learn more about \"Hearing Loss: Care Instructions.\"  Current as of: October 27, 2024  Content Version: 14.4    5049-5966 [employer].   Care instructions adapted under license by your healthcare professional. If you have questions about a medical condition or this instruction, always ask your healthcare professional. [employer] disclaims any warranty or liability for your use of this information.    Relationships for Good Health  Relationships are important for our health and happiness. Social isolation, loneliness and lack of support are bad for your " health. Studies show that loneliness can harm health and limit your life span as much as high blood pressure and smoking.   Take some time to reflect on your relationships. Then answer these questions:  Are there people in your life that cause you stress or drain your energy? What can you do to set limits?  ________________________________________________________________________________________________________________________________________________________________________________________________________________________________________________________________________________________________________________________________________________  Who do you enjoy spending time with? Who can you go to for support?  ________________________________________________________________________________________________________________________________________________________________________________________________________________________________________________________________________________________________________________________________________________  What can you do to improve your relationships with others?  __________________________________________________________________________________________________________________________________________________________________________________________________________________  ______________________________________________________________________________________________________________________________  What do you like most about your relationships with others?  ________________________________________________________________________________________________________________________________________________________________________________________________________________________________________________________________________________________________________________________________________________  My goal: ______________________________________________________________________  I will:  ______________________________________________________________________________________________________________________________________________________________________________________________    For informational purposes only. Not to replace the advice of your health care provider. Copyright   2018 [facility]. All rights reserved. Clinically reviewed by [facility]  Team. [employer] [id number] - Rev 06/24.  Learning About Stress  What is stress?     Stress is your body's response to a hard situation. Your body can have a physical, emotional, or mental response. Stress is a fact of life for most people, and it affects everyone differently. What causes stress for you may not be stressful for someone else.  A lot of things can cause stress. You may feel stress when you go on a job interview, take a test, or run a race. This kind of short-term stress is normal and even useful. It can help you if you need to work hard or react quickly. For example, stress can help you finish an important job on time.  Long-term stress is caused by ongoing stressful situations or events. Examples of long-term stress include long-term health problems, ongoing problems at work, or conflicts in your family. Long-term stress can harm your health.  How does stress affect your health?  When you are stressed, your body responds as though you are in danger. It makes hormones that speed up your heart, make you breathe faster, and give you a burst of energy. This is called the fight-or-flight stress response. If the stress is over quickly, your body goes back to normal and no harm is done.  But if stress happens too often or lasts too long, it can have bad effects. Long-term stress can make you more likely to get sick, and it can make symptoms of some diseases worse. If you tense up when you are stressed, you may develop neck, shoulder, or low back pain. Stress is linked to high blood pressure and heart disease.  Stress also  harms your emotional health. It can make you lopez, tense, or depressed. Your relationships may suffer, and you may not do well at work or school.  What can you do to manage stress?  You can try these things to help manage stress:   Do something active. Exercise or activity can help reduce stress. Walking is a great way to get started. Even everyday activities such as housecleaning or yard work can help.  Try yoga or reji chi. These techniques combine exercise and meditation. You may need some training at first to learn them.  Do something you enjoy. For example, listen to music or go to a movie. Practice your hobby or do volunteer work.  Meditate. This can help you relax, because you are not worrying about what happened before or what may happen in the future.  Do guided imagery. Imagine yourself in any setting that helps you feel calm. You can use online videos, books, or a teacher to guide you.  Do breathing exercises. For example:  From a standing position, bend forward from the waist with your knees slightly bent. Let your arms dangle close to the floor.  Breathe in slowly and deeply as you return to a standing position. Roll up slowly and lift your head last.  Hold your breath for just a few seconds in the standing position.  Breathe out slowly and bend forward from the waist.  Let your feelings out. Talk, laugh, cry, and express anger when you need to. Talking with supportive friends or family, a counselor, or a armando leader about your feelings is a healthy way to relieve stress. Avoid discussing your feelings with people who make you feel worse.  Write. It may help to write about things that are bothering you. This helps you find out how much stress you feel and what is causing it. When you know this, you can find better ways to cope.  What can you do to prevent stress?  You might try some of these things to help prevent stress:  Manage your time. This helps you find time to do the things you want and need to  "do.  Get enough sleep. Your body recovers from the stresses of the day while you are sleeping.  Get support. Your family, friends, and community can make a difference in how you experience stress.  Limit your news feed. Avoid or limit time on social media or news that may make you feel stressed.  Do something active. Exercise or activity can help reduce stress. Walking is a great way to get started.  Where can you learn more?  Go to https://www.[employer].Glide/patiented  Enter N032 in the search box to learn more about \"Learning About Stress.\"  Current as of: October 24, 2024  Content Version: 14.4    7358-3884 [employer].   Care instructions adapted under license by your healthcare professional. If you have questions about a medical condition or this instruction, always ask your healthcare professional. [employer] disclaims any warranty or liability for your use of this information.    Learning About Sleeping Well  What does sleeping well mean?     Sleeping well means getting enough sleep to feel good and stay healthy. How much sleep is enough varies among people.  The number of hours you sleep and how you feel when you wake up are both important. If you do not feel refreshed, you probably need more sleep. Another sign of not getting enough sleep is feeling tired during the day.  Experts recommend that adults get at least 7 or more hours of sleep per day. Children and older adults need more sleep.  Why is getting enough sleep important?  Getting enough quality sleep is a basic part of good health. When your sleep suffers, your physical health, mood, and your thoughts can suffer too. You may find yourself feeling more grumpy or stressed. Not getting enough sleep also can lead to serious problems, including injury, accidents, anxiety, and depression.  What might cause poor sleeping?  Many things can cause sleep problems, including:  Changes to your sleep schedule.  Stress. Stress can be caused " "by fear about a single event, such as giving a speech. Or you may have ongoing stress, such as worry about work or school.  Depression, anxiety, and other mental or emotional conditions.  Changes in your sleep habits or surroundings. This includes changes that happen where you sleep, such as noise, light, or sleeping in a different bed. It also includes changes in your sleep pattern, such as having jet lag or working a late shift.  Health problems, such as pain, breathing problems, and restless legs syndrome.  Lack of regular exercise.  Using alcohol, nicotine, or caffeine before bed.  How can you help yourself?  Here are some tips that may help you sleep more soundly and wake up feeling more refreshed.  Your sleeping area   Use your bedroom only for sleeping and sex. A bit of light reading may help you fall asleep. But if it doesn't, do your reading elsewhere in the house. Try not to use your TV, computer, smartphone, or tablet while you are in bed.  Be sure your bed is big enough to stretch out comfortably, especially if you have a sleep partner.  Keep your bedroom quiet, dark, and cool. Use curtains, blinds, or a sleep mask to block out light. To block out noise, use earplugs, soothing music, or a \"white noise\" machine.  Your evening and bedtime routine   Create a relaxing bedtime routine. You might want to take a warm shower or bath, or listen to soothing music.  Go to bed at the same time every night. And get up at the same time every morning, even if you feel tired.  What to avoid   Limit caffeine (coffee, tea, caffeinated sodas) during the day, and don't have any for at least 6 hours before bedtime.  Avoid drinking alcohol before bedtime. Alcohol can cause you to wake up more often during the night.  Try not to smoke or use tobacco, especially in the evening. Nicotine can keep you awake.  Limit naps during the day, especially close to bedtime.  Avoid lying in bed awake for too long. If you can't fall asleep " "or if you wake up in the middle of the night and can't get back to sleep within about 20 minutes, get out of bed and go to another room until you feel sleepy.  Avoid taking medicine right before bed that may keep you awake or make you feel hyper or energized. Your doctor can tell you if your medicine may do this and if you can take it earlier in the day.  If you can't sleep   Imagine yourself in a peaceful, pleasant scene. Focus on the details and feelings of being in a place that is relaxing.  Get up and do a quiet or boring activity until you feel sleepy.  Avoid drinking any liquids before going to bed to help prevent waking up often to use the bathroom.  Where can you learn more?  Go to https://www.[employer].net/patiented  Enter J942 in the search box to learn more about \"Learning About Sleeping Well.\"  Current as of: July 31, 2024  Content Version: 14.4    1809-3139 [employer].   Care instructions adapted under license by your healthcare professional. If you have questions about a medical condition or this instruction, always ask your healthcare professional. [employer] disclaims any warranty or liability for your use of this information.    Bladder Training: Care Instructions  Your Care Instructions     Bladder training is used to treat urge incontinence and stress incontinence. Urge incontinence means that the need to urinate comes on so fast that you can't get to a toilet in time. Stress incontinence means that you leak urine because of pressure on your bladder. For example, it may happen when you laugh, cough, or lift something heavy.  Bladder training can increase how long you can wait before you have to urinate. It can also help your bladder hold more urine. And it can give you better control over the urge to urinate.  It is important to remember that bladder training takes a few weeks to a few months to make a difference. You may not see results right away, but don't give " up.  Follow-up care is a key part of your treatment and safety. Be sure to make and go to all appointments, and call your doctor if you are having problems. It's also a good idea to know your test results and keep a list of the medicines you take.  How can you care for yourself at home?  Work with your doctor to come up with a bladder training program that is right for you. You may use one or more of the following methods.  Delayed urination  In the beginning, try to keep from urinating for 5 minutes after you first feel the need to go.  While you wait, take deep, slow breaths to relax. Kegel exercises can also help you delay the need to go to the bathroom.  After some practice, when you can easily wait 5 minutes to urinate, try to wait 10 minutes before you urinate.  Slowly increase the waiting period until you are able to control when you have to urinate.  Scheduled urination  Empty your bladder when you first wake up in the morning.  Schedule times throughout the day when you will urinate.  Start by going to the bathroom every hour, even if you don't need to go.  Slowly increase the time between trips to the bathroom.  When you have found a schedule that works well for you, keep doing it.  If you wake up during the night and have to urinate, do it. Apply your schedule to waking hours only.  Kegel exercises  These tighten and strengthen pelvic muscles, which can help you control the flow of urine. (If doing these exercises causes pain, stop doing them and talk with your doctor.) To do Kegel exercises:  Squeeze your muscles as if you were trying not to pass gas. Or squeeze your muscles as if you were stopping the flow of urine. Your belly, legs, and buttocks shouldn't move.  Hold the squeeze for 3 seconds, then relax for 5 to 10 seconds.  Start with 3 seconds, then add 1 second each week until you are able to squeeze for 10 seconds.  Repeat the exercise 10 times a session. Do 3 to 8 sessions a day.  When should you  "call for help?  Watch closely for changes in your health, and be sure to contact your doctor if:    Your incontinence is getting worse.     You do not get better as expected.   Where can you learn more?  Go to https://www.[employer].net/patiented  Enter V684 in the search box to learn more about \"Bladder Training: Care Instructions.\"  Current as of: April 30, 2024  Content Version: 14.4    1640-7117 [employer].   Care instructions adapted under license by your healthcare professional. If you have questions about a medical condition or this instruction, always ask your healthcare professional. [employer] disclaims any warranty or liability for your use of this information.    Substance Use Disorder: Care Instructions  Overview     You can improve your life and health by stopping your use of alcohol or drugs. When you don't drink or use drugs, you may feel and sleep better. You may get along better with your family, friends, and coworkers. There are medicines and programs that can help with substance use disorder.  How can you care for yourself at home?  Here are some ways to help you stay sober and prevent relapse.  If you have been given medicine to help keep you sober or reduce your cravings, be sure to take it exactly as prescribed.  Talk to your doctor about programs that can help you stop using drugs or drinking alcohol.  Do not keep alcohol or drugs in your home.  Plan ahead. Think about what you'll say if other people ask you to drink or use drugs. Try not to spend time with people who drink or use drugs.  Use the time and money spent on drinking or drugs to do something that's important to you.  Preventing a relapse  Have a plan to deal with relapse. Learn to recognize changes in your thinking that lead you to drink or use drugs. Get help before you start to drink or use drugs again.  Try to stay away from situations, friends, or places that may lead you to drink or use drugs.  If you " feel the need to drink alcohol or use drugs again, seek help right away. Call a trusted friend or family member. Some people get support from organizations such as Narcotics Anonymous or [employer] or from treatment facilities.  If you relapse, get help as soon as you can. Some people make a plan with another person that outlines what they want that person to do for them if they relapse. The plan usually includes how to handle the relapse and who to notify in case of relapse.  Don't give up. Remember that a relapse doesn't mean that you have failed. Use the experience to learn the triggers that lead you to drink or use drugs. Then quit again. Recovery is a lifelong process. Many people have several relapses before they are able to quit for good.  Follow-up care is a key part of your treatment and safety. Be sure to make and go to all appointments, and call your doctor if you are having problems. It's also a good idea to know your test results and keep a list of the medicines you take.  When should you call for help?   Call 911  anytime you think you may need emergency care. For example, call if you or someone else:    Has overdosed or has withdrawal signs. Be sure to tell the emergency workers that you are or someone else is using or trying to quit using drugs. Overdose or withdrawal signs may include:  Losing consciousness.  Seizure.  Seeing or hearing things that aren't there (hallucinations).     Is thinking or talking about suicide or harming others.   Where to get help 24 hours a day, 7 days a week   If you or someone you know talks about suicide, self-harm, a mental health crisis, a substance use crisis, or any other kind of emotional distress, get help right away. You can:    Call the Suicide and Crisis Lifeline at 965.     Call 0-909-110-TALK (1-793.151.5212).     Text HOME to 528324 to access the Crisis Text Line.   Consider saving these numbers in your phone.  Go to [email or web address] for more information  "or to chat online.  Call your doctor now or seek immediate medical care if:    You are having withdrawal symptoms. These may include nausea or vomiting, sweating, shakiness, and anxiety.   Watch closely for changes in your health, and be sure to contact your doctor if:    You have a relapse.     You need more help or support to stop.   Where can you learn more?  Go to https://www.[employer].net/patiented  Enter H573 in the search box to learn more about \"Substance Use Disorder: Care Instructions.\"  Current as of: August 20, 2024  Content Version: 14.4    0556-7089 [employer]   Care instructions adapted under license by your healthcare professional. If you have questions about a medical condition or this instruction, always ask your healthcare professional. [employer] disclaims any warranty or liability for your use of this information.       "

## 2025-04-04 LAB
ALBUMIN SERPL BCG-MCNC: 4.2 G/DL (ref 3.5–5.2)
ALP SERPL-CCNC: 76 U/L (ref 40–150)
ALT SERPL W P-5'-P-CCNC: 19 U/L (ref 0–70)
ANION GAP SERPL CALCULATED.3IONS-SCNC: 9 MMOL/L (ref 7–15)
AST SERPL W P-5'-P-CCNC: 25 U/L (ref 0–45)
BILIRUB SERPL-MCNC: 0.3 MG/DL
BUN SERPL-MCNC: 20.7 MG/DL (ref 8–23)
CALCIUM SERPL-MCNC: 9.8 MG/DL (ref 8.8–10.4)
CHLORIDE SERPL-SCNC: 107 MMOL/L (ref 98–107)
CHOLEST SERPL-MCNC: 148 MG/DL
CREAT SERPL-MCNC: 1.15 MG/DL (ref 0.67–1.17)
CREAT UR-MCNC: 134 MG/DL
EGFRCR SERPLBLD CKD-EPI 2021: 70 ML/MIN/1.73M2
FASTING STATUS PATIENT QL REPORTED: ABNORMAL
FASTING STATUS PATIENT QL REPORTED: NORMAL
GLUCOSE SERPL-MCNC: 94 MG/DL (ref 70–99)
HCO3 SERPL-SCNC: 25 MMOL/L (ref 22–29)
HDLC SERPL-MCNC: 51 MG/DL
LDLC SERPL CALC-MCNC: 64 MG/DL
MICROALBUMIN UR-MCNC: <12 MG/L
MICROALBUMIN/CREAT UR: NORMAL MG/G{CREAT}
NONHDLC SERPL-MCNC: 97 MG/DL
POTASSIUM SERPL-SCNC: 4 MMOL/L (ref 3.4–5.3)
PROT SERPL-MCNC: 6.9 G/DL (ref 6.4–8.3)
PSA SERPL DL<=0.01 NG/ML-MCNC: 2.51 NG/ML (ref 0–4.5)
SODIUM SERPL-SCNC: 141 MMOL/L (ref 135–145)
TRIGL SERPL-MCNC: 165 MG/DL

## 2025-04-04 NOTE — RESULT ENCOUNTER NOTE
"Your long term blood sugar testing is borderline elevated.  This is in the \"prediabetic\" range.  Exercise and limiting carbohydrate and sugars in diet can be helpful at preventing progression to diabetes. It is improved compared with previous.  Please call or MyChart message me if you have any questions.      PSK"

## 2025-04-05 RX ORDER — AMLODIPINE BESYLATE 10 MG/1
10 TABLET ORAL DAILY
Qty: 90 TABLET | Refills: 1 | Status: SHIPPED | OUTPATIENT
Start: 2025-04-05

## 2025-04-05 RX ORDER — HYDROCHLOROTHIAZIDE 25 MG/1
25 TABLET ORAL DAILY
Qty: 90 TABLET | Refills: 1 | Status: SHIPPED | OUTPATIENT
Start: 2025-04-05

## 2025-04-05 NOTE — RESULT ENCOUNTER NOTE
Your cholesterol is under good control and triglycerides are improved compared with previous.  These are typically elevated when you are not fasting so are not of concern at the current level.  Prostate cancer screening is negative with normal PSA noted.  Current kidney function and liver testing are both normal.  Urine testing is normal.  There is no elevated protein present.  Please call or MyChart message me if you have any questions.      PSK

## 2025-04-08 ENCOUNTER — HOSPITAL ENCOUNTER (OUTPATIENT)
Facility: CLINIC | Age: 70
Discharge: HOME | End: 2025-04-08
Attending: COLON & RECTAL SURGERY | Admitting: COLON & RECTAL SURGERY
Payer: COMMERCIAL

## 2025-04-08 VITALS
RESPIRATION RATE: 19 BRPM | DIASTOLIC BLOOD PRESSURE: 90 MMHG | OXYGEN SATURATION: 98 % | HEART RATE: 47 BPM | SYSTOLIC BLOOD PRESSURE: 126 MMHG

## 2025-04-08 LAB — COLONOSCOPY: NORMAL

## 2025-04-08 PROCEDURE — 45385 COLONOSCOPY W/LESION REMOVAL: CPT | Performed by: COLON & RECTAL SURGERY

## 2025-04-08 PROCEDURE — 88305 TISSUE EXAM BY PATHOLOGIST: CPT | Mod: TC | Performed by: COLON & RECTAL SURGERY

## 2025-04-08 PROCEDURE — 250N000011 HC RX IP 250 OP 636: Mod: JZ | Performed by: COLON & RECTAL SURGERY

## 2025-04-08 PROCEDURE — G0500 MOD SEDAT ENDO SERVICE >5YRS: HCPCS | Mod: PT | Performed by: COLON & RECTAL SURGERY

## 2025-04-08 PROCEDURE — 88305 TISSUE EXAM BY PATHOLOGIST: CPT | Mod: 26 | Performed by: PATHOLOGY

## 2025-04-08 RX ORDER — NALOXONE HYDROCHLORIDE 0.4 MG/ML
0.4 INJECTION, SOLUTION INTRAMUSCULAR; INTRAVENOUS; SUBCUTANEOUS
Status: CANCELLED | OUTPATIENT
Start: 2025-04-08

## 2025-04-08 RX ORDER — LIDOCAINE 40 MG/G
CREAM TOPICAL
Status: CANCELLED | OUTPATIENT
Start: 2025-04-08

## 2025-04-08 RX ORDER — SODIUM CHLORIDE, SODIUM LACTATE, POTASSIUM CHLORIDE, CALCIUM CHLORIDE 600; 310; 30; 20 MG/100ML; MG/100ML; MG/100ML; MG/100ML
INJECTION, SOLUTION INTRAVENOUS CONTINUOUS
Status: CANCELLED | OUTPATIENT
Start: 2025-04-08

## 2025-04-08 RX ORDER — ONDANSETRON 2 MG/ML
4 INJECTION INTRAMUSCULAR; INTRAVENOUS
Status: CANCELLED | OUTPATIENT
Start: 2025-04-08

## 2025-04-08 RX ORDER — FENTANYL CITRATE 50 UG/ML
INJECTION, SOLUTION INTRAMUSCULAR; INTRAVENOUS PRN
Status: DISCONTINUED | OUTPATIENT
Start: 2025-04-08 | End: 2025-04-08 | Stop reason: HOSPADM

## 2025-04-08 RX ORDER — FLUMAZENIL 0.1 MG/ML
0.2 INJECTION, SOLUTION INTRAVENOUS
Status: CANCELLED | OUTPATIENT
Start: 2025-04-08 | End: 2025-04-08

## 2025-04-08 RX ORDER — PROCHLORPERAZINE MALEATE 5 MG/1
5 TABLET ORAL EVERY 6 HOURS PRN
Status: CANCELLED | OUTPATIENT
Start: 2025-04-08

## 2025-04-08 RX ORDER — NALOXONE HYDROCHLORIDE 0.4 MG/ML
0.2 INJECTION, SOLUTION INTRAMUSCULAR; INTRAVENOUS; SUBCUTANEOUS
Status: CANCELLED | OUTPATIENT
Start: 2025-04-08

## 2025-04-08 RX ORDER — ONDANSETRON 4 MG/1
4 TABLET, ORALLY DISINTEGRATING ORAL EVERY 6 HOURS PRN
Status: CANCELLED | OUTPATIENT
Start: 2025-04-08

## 2025-04-08 RX ORDER — ONDANSETRON 2 MG/ML
4 INJECTION INTRAMUSCULAR; INTRAVENOUS EVERY 6 HOURS PRN
Status: CANCELLED | OUTPATIENT
Start: 2025-04-08

## 2025-04-08 ASSESSMENT — ACTIVITIES OF DAILY LIVING (ADL)
ADLS_ACUITY_SCORE: 53

## 2025-04-09 LAB
PATH REPORT.COMMENTS IMP SPEC: NORMAL
PATH REPORT.COMMENTS IMP SPEC: NORMAL
PATH REPORT.FINAL DX SPEC: NORMAL
PATH REPORT.GROSS SPEC: NORMAL
PATH REPORT.MICROSCOPIC SPEC OTHER STN: NORMAL
PATH REPORT.RELEVANT HX SPEC: NORMAL
PHOTO IMAGE: NORMAL

## 2025-04-22 PROBLEM — D12.6 ADENOMATOUS COLON POLYP: Status: ACTIVE | Noted: 2025-04-22

## 2025-07-08 PROBLEM — H91.93 BILATERAL HEARING LOSS: Status: ACTIVE | Noted: 2020-08-24

## 2025-07-08 PROBLEM — G93.32 CHRONIC FATIGUE SYNDROME: Status: ACTIVE | Noted: 2020-01-01

## 2025-07-08 PROBLEM — H35.389: Status: ACTIVE | Noted: 2025-07-08

## 2025-07-08 PROBLEM — J30.9 ALLERGIC RHINITIS: Status: ACTIVE | Noted: 2025-07-08

## 2025-07-08 PROBLEM — N28.89 RENAL MASS: Status: ACTIVE | Noted: 2020-08-24

## 2025-07-08 PROBLEM — F32.A DEPRESSIVE DISORDER: Status: ACTIVE | Noted: 2025-07-08

## 2025-07-08 ASSESSMENT — SLEEP AND FATIGUE QUESTIONNAIRES
HOW LIKELY ARE YOU TO NOD OFF OR FALL ASLEEP WHEN YOU ARE A PASSENGER IN A CAR FOR AN HOUR WITHOUT A BREAK: MODERATE CHANCE OF DOZING
HOW LIKELY ARE YOU TO NOD OFF OR FALL ASLEEP WHILE SITTING AND TALKING TO SOMEONE: SLIGHT CHANCE OF DOZING
HOW LIKELY ARE YOU TO NOD OFF OR FALL ASLEEP WHILE SITTING INACTIVE IN A PUBLIC PLACE: HIGH CHANCE OF DOZING
HOW LIKELY ARE YOU TO NOD OFF OR FALL ASLEEP WHILE SITTING AND READING: HIGH CHANCE OF DOZING
HOW LIKELY ARE YOU TO NOD OFF OR FALL ASLEEP WHILE WATCHING TV: HIGH CHANCE OF DOZING
HOW LIKELY ARE YOU TO NOD OFF OR FALL ASLEEP WHILE SITTING QUIETLY AFTER LUNCH WITHOUT ALCOHOL: HIGH CHANCE OF DOZING
HOW LIKELY ARE YOU TO NOD OFF OR FALL ASLEEP IN A CAR, WHILE STOPPED FOR A FEW MINUTES IN TRAFFIC: SLIGHT CHANCE OF DOZING
HOW LIKELY ARE YOU TO NOD OFF OR FALL ASLEEP WHILE LYING DOWN TO REST IN THE AFTERNOON WHEN CIRCUMSTANCES PERMIT: HIGH CHANCE OF DOZING

## 2025-07-09 ENCOUNTER — OFFICE VISIT (OUTPATIENT)
Dept: SLEEP MEDICINE | Facility: CLINIC | Age: 70
End: 2025-07-09
Attending: FAMILY MEDICINE
Payer: COMMERCIAL

## 2025-07-09 VITALS
WEIGHT: 217.5 LBS | DIASTOLIC BLOOD PRESSURE: 80 MMHG | HEIGHT: 72 IN | HEART RATE: 60 BPM | RESPIRATION RATE: 15 BRPM | OXYGEN SATURATION: 96 % | BODY MASS INDEX: 29.46 KG/M2 | SYSTOLIC BLOOD PRESSURE: 120 MMHG

## 2025-07-09 DIAGNOSIS — F32.A DEPRESSIVE DISORDER: ICD-10-CM

## 2025-07-09 DIAGNOSIS — J34.2 DEVIATED NASAL SEPTUM: ICD-10-CM

## 2025-07-09 DIAGNOSIS — J30.2 SEASONAL ALLERGIC RHINITIS, UNSPECIFIED TRIGGER: ICD-10-CM

## 2025-07-09 DIAGNOSIS — I10 PRIMARY HYPERTENSION: ICD-10-CM

## 2025-07-09 DIAGNOSIS — G47.33 OSA (OBSTRUCTIVE SLEEP APNEA): Primary | ICD-10-CM

## 2025-07-09 DIAGNOSIS — E78.5 HYPERLIPIDEMIA, UNSPECIFIED HYPERLIPIDEMIA TYPE: ICD-10-CM

## 2025-07-09 DIAGNOSIS — G93.32 CHRONIC FATIGUE SYNDROME: ICD-10-CM

## 2025-07-09 DIAGNOSIS — K44.9 DIAPHRAGMATIC HERNIA WITHOUT OBSTRUCTION AND WITHOUT GANGRENE: Chronic | ICD-10-CM

## 2025-07-09 PROBLEM — H90.11 CONDUCTIVE HEARING LOSS IN RIGHT EAR: Status: ACTIVE | Noted: 2025-07-09

## 2025-07-09 PROBLEM — H65.01 ACUTE SEROUS OTITIS MEDIA OF RIGHT EAR: Status: ACTIVE | Noted: 2025-07-09

## 2025-07-09 PROBLEM — J39.2 DISORDER OF PHARYNX: Status: ACTIVE | Noted: 2025-07-09

## 2025-07-09 PROBLEM — J39.2: Status: ACTIVE | Noted: 2025-07-09

## 2025-07-09 PROCEDURE — 99205 OFFICE O/P NEW HI 60 MIN: CPT | Performed by: NURSE PRACTITIONER

## 2025-07-09 PROCEDURE — 3079F DIAST BP 80-89 MM HG: CPT | Performed by: NURSE PRACTITIONER

## 2025-07-09 PROCEDURE — 1126F AMNT PAIN NOTED NONE PRSNT: CPT | Performed by: NURSE PRACTITIONER

## 2025-07-09 PROCEDURE — 3074F SYST BP LT 130 MM HG: CPT | Performed by: NURSE PRACTITIONER

## 2025-07-09 RX ORDER — ZOLPIDEM TARTRATE 10 MG/1
TABLET ORAL
Qty: 1 TABLET | Refills: 0 | Status: SHIPPED | OUTPATIENT
Start: 2025-07-09

## 2025-07-09 NOTE — PROGRESS NOTES
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      Effort: Pulmonary effort is normal.   Musculoskeletal:         General: Normal range of motion.      Cervical back: Normal range of motion and neck supple.   Skin:     General: Skin is warm and dry.      Capillary Refill: Capillary refill takes less than 2 seconds.   Neurological:      General: No focal deficit present.      Mental Status: He is alert and oriented to person, place, and time.   Psychiatric:         Mood and Affect: Mood normal.         Behavior: Behavior normal. Behavior is cooperative.         Thought Content: Thought content normal.         Judgment: Judgment normal.              All Labs Personally Reviewed             Data: All pertinent previous laboratory data reviewed     Recent Labs   Lab Test 04/03/25  1656 09/27/24  1649    143   POTASSIUM 4.0 3.6   CHLORIDE 107 106   CO2 25 26   ANIONGAP 9 11   GLC 94 143*   BUN 20.7 17.3   CR 1.15 1.09   LAE 9.8 9.9       Recent Labs   Lab Test 09/27/24  1649   WBC 9.9   RBC 5.17   HGB 14.9   HCT 44.4   MCV 86   MCH 28.8   MCHC 33.6   RDW 14.5          Recent Labs   Lab Test 04/03/25  1656   PROTTOTAL 6.9   ALBUMIN 4.2   BILITOTAL 0.3  "  ALKPHOS 76   AST 25   ALT 19       TSH   Date Value   09/27/2024 0.30 uIU/mL   12/05/2022 0.24 mU/L (L)   08/25/2022 0.36 mU/L (L)       No results found for: \"UAMP\", \"UBARB\", \"BENZODIAZEUR\", \"UCANN\", \"UCOC\", \"OPIT\", \"UPCP\"    Ferritin   Date/Time Value Ref Range Status   12/05/2022 09:58  26 - 388 ng/mL Final       No results found for: \"PH\", \"PHARTERIAL\", \"PO2\", \"WO2ZOQVJYLA\", \"SAT\", \"PCO2\", \"HCO3\", \"BASEEXCESS\", \"ADALID\", \"BEB\"    @LABRCNTIPR(phv:4,pco2v:4,po2v:4,hco3v:4,reed:4,o2per:4)@    Echocardiology:         Chest x-ray: No results found for this or any previous visit from the past 365 days.      Chest CT: No results found for this or any previous visit from the past 365 days.      PFT: Most Recent [facility] Pulmonary Function Testing        Marie Bennett, APRN CNP 7/8/2025   Sleep Medicine          "

## 2025-07-09 NOTE — NURSING NOTE
Chief Complaint   Patient presents with    Sleep Problem       Initial /80   Pulse 60   Resp 15   Ht 1.829 m (6')   Wt 98.7 kg (217 lb 8 oz)   SpO2 96%   BMI 29.50 kg/m   Estimated body mass index is 29.5 kg/m  as calculated from the following:    Height as of this encounter: 1.829 m (6').    Weight as of this encounter: 98.7 kg (217 lb 8 oz).    Medication Reconciliation: complete    Neck circumference: 16 inches / 41 centimeters.  ESS: 19  DME: N/A      BINA Heath

## 2025-07-09 NOTE — PATIENT INSTRUCTIONS
"          MY TREATMENT INFORMATION FOR SLEEP APNEA-  Edin Robles    DOCTOR : JOSSELINE Virgen CNP    Am I having a sleep study at a sleep center?  --->Due to normal delays, you will be contacted within 2-4 weeks to schedule    Am I having a home sleep study?  --->Watch the video for the device you are using:    -/drop off device-   https://www.Caption Data.com/watch?v=yGGFBdELGhk    -Disposable device sent out require phone/computer application-   https://www.Caption Data.com/watch?v=BCce_vbiwxE      Frequently asked questions:  1. What is Obstructive Sleep Apnea (CHELITA)? CHELITA is the most common type of sleep apnea. Apnea means, \"without breath.\"  Apnea is most often caused by narrowing or collapse of the upper airway as muscles relax during sleep.   Almost everyone has occasional apneas. Most people with sleep apnea have had brief interruptions at night frequently for many years.  The severity of sleep apnea is related to how frequent and severe the events are.   2. What are the consequences of CHELITA? Symptoms include: feeling sleepy during the day, snoring loudly, gasping or stopping of breathing, trouble sleeping, and occasionally morning headaches or heartburn at night.  Sleepiness can be serious and even increase the risk of falling asleep while driving. Other health consequences may include development of high blood pressure and other cardiovascular disease in persons who are susceptible. Untreated CHELITA  can contribute to heart disease, stroke and diabetes.   3. What are the treatment options? In most situations, sleep apnea is a lifelong disease that must be managed with daily therapy. Medications are not effective for sleep apnea and surgery is generally not considered until other therapies have been tried. Your treatment is your choice . Continuous Positive Airway (CPAP) works right away and is the therapy that is effective in nearly everyone. An oral device to hold your jaw forward is usually the next " most reliable option. Other options include postioning devices (to keep you off your back), weight loss, and surgery including a tongue pacing device. There is more detail about some of these options below.  4. Are my sleep studies covered by insurance? Although we will request verification of coverage, we advise you also check in advance of the study to ensure there is coverage.    Important tips for those choosing CPAP and similar devices  REMEMBER-IF YOU RECEIVE A CALL FROM  [phone]-->IT IS TO SETUP A DEVICE  For new devices, sign up for device JOHANA to monitor your device for your followup visits  We encourage you to utilize the Tvoop johana or website ( [email or web address] ) to monitor your therapy progress and share the data with your healthcare team when you discuss your sleep apnea.                                                    Know your equipment:  CPAP is continuous positive airway pressure that prevents obstructive sleep apnea by keeping the throat from collapsing while you are sleeping. In most cases, the device is  smart  and can slowly self-adjusts if your throat collapses and keeps a record every day of how well you are treated-this information is available to you and your care team.  BPAP is bilevel positive airway pressure that keeps your throat open and also assists each breath with a pressure boost to maintain adequate breathing.  Special kinds of BPAP are used in patients who have inadequate breathing from lung or heart disease. In most cases, the device is  smart  and can slowly self-adjusts to assist breathing. Like CPAP, the device keeps a record of how well you are treated.  Your mask is your connection to the device. You get to choose what feels most comfortable and the staff will help to make sure if fits. Here: are some examples of the different masks that are available: Magnetic mask aids may assist with use but there are safety issues that should be addressed when  considering with magnets* ( see end of discussion).       Key points to remember on your journey with sleep apnea:  Sleep study.  PAP devices often need to be adjusted during a sleep study to show that they are effective and adjusted right.  Good tips to remember: Try wearing just the mask during a quiet time during the day so your body adapts to wearing it. A humidifier is recommended for comfort in most cases to prevent drying of your nose and throat. Allergy medication from your provider may help you if you are having nasal congestion.  Getting settled-in. It takes more than one night for most of us to get used to wearing a mask. Try wearing just the mask during a quiet time during the day so your body adapts to wearing it. A humidifier is recommended for comfort in most cases. Our team will work with you carefully on the first day and will be in contact within 4 days and again at 2 and 4 weeks for advice and remote device adjustments. Your therapy is evaluated by the device each day.   Use it every night. The more you are able to sleep naturally for 7-8 hours, the more likely you will have good sleep and to prevent health risks or symptoms from sleep apnea. Even if you use it 4 hours it helps. Occasionally all of us are unable to use a medical therapy, in sleep apnea, it is not dangerous to miss one night.   Communicate. Call our skilled team on the number provided on the first day if your visit for problems that make it difficult to wear the device. Over 2 out of 3 patients can learn to wear the device long-term with help from our team. Remember to call our team or your sleep providers if you are unable to wear the device as we may have other solutions for those who cannot adapt to mask CPAP therapy. It is recommended that you sleep your sleep provider within the first 3 months and yearly after that if you are not having problems.   Use it for your health. We encourage use of CPAP masks during daytime quiet  periods to allow your face and brain to adapt to the sensation of CPAP so that it will be a more natural sensation to awaken to at night or during naps. This can be very useful during the first few weeks or months of adapting to CPAP though it does not help medically to wear CPAP during wakefulness and  should not be used as a strategy just to meet guidelines.  Take care of your equipment. Make sure you clean your mask and tubing using directions every day and that your filter and mask are replaced as recommended or if they are not working.     *Masks with magnets:  Updated Contraindications  Masks with magnetic components are contraindicated for use by patients where they, or anyone in close physical contact while using the mask, have the following:   Active medical implants that interact with magnets (i.e., pacemakers, implantable cardioverter defibrillators (ICD), neurostimulators, cerebrospinal fluid (CSF) shunts, insulin/infusion pumps)   Metallic implants/objects containing ferromagnetic material (i.e., aneurysm clips/flow disruption devices, embolic coils, stents, valves, electrodes, implants to restore hearing or balance with implanted magnets, ocular implants, metallic splinters in the eye)  Updated Warning  Keep the mask magnets at a safe distance of at least 6 inches (150 mm) away from implants or medical devices that may be adversely affected by magnetic interference. This warning applies to you or anyone in close physical contact with your mask. The magnets are in the frame and lower headgear clips, with a magnetic field strength of up to 400mT. When worn, they connect to secure the mask but may inadvertently detach while asleep.  Implants/medical devices, including those listed within contraindications, may be adversely affected if they change function under external magnetic fields or contain ferromagnetic materials that attract/repel to magnetic fields (some metallic implants, e.g., contact lenses  with metal, dental implants, metallic cranial plates, screws, wagner hole covers, and bone substitute devices). Consult your physician and  of your implant / other medical device for information on the potential adverse effects of magnetic fields.    BESIDES CPAP, WHAT OTHER THERAPIES ARE THERE?    Positioning Device  Positioning devices are generally used when sleep apnea is mild and only occurs on your back.This example shows a pillow that straps around the waist. It may be appropriate for those whose sleep study shows milder sleep apnea that occurs primarily when lying flat on one's back. Preliminary studies have shown benefit but effectiveness at home may need to be verified by a home sleep test. These devices are generally not covered by medical insurance.  Examples of devices that maintain sleeping on the back to prevent snoring and mild sleep apnea.    Belt type body positioner  http://MemberPass/    Electronic reminder  http://nightshifttherapy.SWYF/            Oral Appliance  What is oral appliance therapy?  An oral appliance device fits on your teeth at night like a retainer used after having braces. The device is made by a specialized dentist and requires several visits over 1-2 months before a manufactured device is made to fit your teeth and is adjusted to prevent your sleep apnea. Once an oral device is working properly, snoring should be improved. A home sleep test may be recommended at that time if to determine whether the sleep apnea is adequately treated.       Some things to remember:  -Oral devices are often, but not always, covered by your medical insurance. Be sure to check with your insurance provider.   -If you are referred for oral therapy, you will be given a list of specialized dentists to consider or you may choose to visit the Web site of the American Academy of Dental Sleep Medicine  -Oral devices are less likely to work if you have severe sleep apnea or are extremely  overweight.     If your doctor makes a referral for a Mandibular Advancement Device, you can call a certified sleep medicine office to verify your medical insurance will be accepted and for proper care in fabricating and adjusting the device  [facility]  Search engine: https://mms.aadsm.org/members/directory/search_bootstrap.php?org_id=ADSM&   Certified in Dental Sleep Medicine    Yonas Daley  Degree: DDS  [address]  [address]  [place] [address]  Professional Phone: [phone]  Website: [email or web address]    Richard Jama  Degree: ADAN  [facility]  [address]  [address]  [place] [address]  Professional Phone: [phone]: [phone]    [facility]  [address] [place] [address]  [address]  [place] [address]  Appointments: [phone]  Fax: [phone]    Sayra Andujar  [facility]  [address]  [place] [address]  Professional Phone: [phone]  Website: [email or web address]      Lucien Vizcaino   [employer]   Degree: MD ADAN   [address] [place] [address]  [address]  [place] [address]  Appointments: [phone]    Pratik Livingston  Degree: ADAN  [address]  [address]  [place] [address]  Professional Phone: [phone]  Fax: [phone]    Marcelino Terry  Degree: DDS  [facility] [place]  [address]  [place] [address]  Professional Phone: [phone]  Website: [email or web address]      Jo Walter  [facility]-you should verify insurance coverage  [address]  [address]  [place] [address]  Professional Phone: [phone]  Website: [email or web address]      Tsering Lawton--DOES NOT ACCEPT INSURANCE  Degree: ADAN--you should verify insurance coverage  [facility]  [address]  [place] [address]  Professional Phone: [phone]    Amanda Fitzgerald  Degree: DDS, PhD  [facility]  [address] [place] [address]   Appointments: [phone]   Fax: [phone]     Yannick Chen [facility]  Degree: DDS  [address] [place] [address]  Professional Phone: [phone]  Fax: [phone]  Website: [email or web address]      Vladimir Sanford  Degree: DDS  [facility]  [address] [place] [address]    Mariona Mulet Pradera  Degree: MS ADAN  [facility]  [address] [place] [address]  Professional Phone: [phone]      Jayne Livingston  Degree: MS ADAN  [facility]  [address]  [address]  [place] [address]  Professional Phone: [phone]    Sydni Morel  Degree: DDS  [facility]  [address] [place] [address]  Appointments: [phone]    Tarun Espana  Degree: KENNETHS  [facility]  [address] [place] [address]  Professional Phone: [phone]  Website: [email or web address]      Alex Paulson  Degree: DDS  [facility]  [address] [place] [address]  Professional Phone: [phone]  Fax: [phone]      Jovany Archibald  Degree: KENNETHS  [facility]  [address]  [address]  [place] [address]    Juan Haynes   Degree: DDS   [facility]   [address]   [address]  [place] [address]  Phone [phone]  Website: [email or web address]    Ban Galicia   Degree: DDS   [address]  [address]  [place] [address]  Appointments: [phone]    Stephanie Teresa   Degree: DDS   [address] [place] [address]   Appointments: [phone]    Carloz Nichole   Degree: ADAN   [address] [place] [address]   Appointments: [phone]    Alex Wills   Degree: DDS   [address] [place] [address]  [place] [address]   Appointments: [phone]    Jj Turner   Degree: DDS   [facility], [address]   [address]   [place] [address]   Appointments: [phone]    Ladan Stapleton   Degree: DDS  [address] [place] [place] [address]  Appointments: [phone]    Andrew Carrell   [address] [place] [address]  Appointments: [phone]    Lizett Larson   Degree: DDS   [facility]   [address] [place] [address]   Appointments: [phone]    Curt Carranza   Degree: DDS   [address] [place] [address]   Appointments: [phone]    Espionza Pascal-   [facility]    Degree: DDS  [address] [place]  [address]  [place] [address]  Appointments: [phone]    Magdaleno Ambrocio   Degree: DDSEAMUS [facility]   [address]  [address]  [place] [address]  Appointment: [phone]    Emily Girl   Degree: MS SONI, AMBIKA   [facility]  [address]   [address]  [place] [address]  Appointments: [phone]    Lucien Bro   Degree: DDS   [address] [place] [address]   Appointments: [phone]   Fax: [phone]        ACCEPT MEDICARE  Kris Andrade DDS  [address], [place] [address]  [phone]; [phone] (fax)  OR Productivity    Branden Ochoa DDS, MS   [facility] [place] [address]   [address]   [place] [address]   Appointments: [phone]   Fax: [phone]     Scottie Tapia   Degree: ALMA FRAZIER   [facility]   [address]  [address]  [place] [address]  Appointments: [phone]  Fax: [phone]    Karol Cuellar BDS, MS(CR). MS (OFP)  Diplomate American Board of Orofacial Pain  [facility]  [address],   [place] [address]  Appointment: [phone]  Fax: [phone]        ADDITIONAL PROVIDERS    Dain Hou DDS    [facility]  [facility]   [facility]  [address] [address]  Appointments: [phone]     [facility]  [address] [facility] [address]  [address]  [place] [address]  Appointments: [phone]  Fax- [phone]    Renetta Stone   Degree: DDS   [facility]    [address]  [address]  [place] [address]  Appointments: [phone]        More detailed information  An oral appliance is a small acrylic device that fits over the upper and lower teeth  (similar to a retainer or a mouth guard). This device slightly moves jaw forward, which moves the base of the tongue forward, opens the airway, improves breathing for effective treat snoring and obstructive sleep apnea in perhaps 7 out of 10 people .  The best working devices are custom-made by a dental device  after a mold is made of the teeth 1, 2, 3.  When is an oral appliance indicated?  Oral appliance therapy is recommended as a first-line treatment for patients with primary snoring, mild sleep apnea, and for patients with moderate sleep apnea who prefer appliance therapy to use of CPAP4, 5. Severity of sleep apnea is determined by sleep testing and is based on the number of respiratory events per hour of sleep.   How successful is oral appliance therapy?  The success rate of oral appliance therapy in patients with mild sleep apnea is 75-80% while in patients with moderate sleep apnea it is 50-70%. The chance of success in patients with severe sleep apnea is 40-50%. The research also shows that oral appliances have a beneficial effect on the cardiovascular health of CHELITA patients at the same magnitude as CPAP therapy7.  Oral appliances should be a second-line treatment in cases of severe sleep apnea, but if not completely successful then a combination therapy utilizing CPAP plus oral appliance therapy may be effective. Oral appliances tend to be effective in a broad range of patients  although studies show that the patients who have the highest success are females, younger patients, those with milder disease, and less severe obesity. 3, 6.   Finding a dentist that practices dental sleep medicine  Specific training is available through the American Academy of Dental Sleep Medicine for dentists interested in working in the field of sleep. To find a dentist who is educated in the field of sleep and the use of oral appliances, near you, visit the Web site of the American Academy of Dental Sleep Medicine.    References  1. Derik et al. Objectively measured vs self-reported compliance during oral appliance therapy for sleep-disordered breathing. Chest 2013; 144(5): 8282-4835.  2. Ap, et al. Objective measurement of compliance during oral appliance therapy for sleep-disordered breathing. Thorax 2013; 68(1): 91-96.  3. Tin et al. Mandibular advancement devices in 620 men and women with CHELITA and snoring: tolerability and predictors of treatment success. Chest 2004; 125: 5354-9169.  4. Talat, et al. Oral appliances for snoring and CHELITA: a review. Sleep 2006; 29: 244-262.  5. Raffy et al. Oral appliance treatment for CHELITA: an update. J Clin Sleep Med 2014; 10(2): 215-227.  6. Tao et al. Predictors of OSAH treatment outcome. J Dent Res 2007; 86: 6531-6254.      Weight Loss:   Your Body mass index is 29.5 kg/m .    Being overweight does not necessarily mean you will have health consequences.  Those who have BMI over 30 or over 27 with existing medical conditions carries greater risk.   Weight loss decreases severity of sleep apnea in most people with obesity. For those with mild obesity who have developed snoring with weight gain, even 15-30 pound weight loss can improve and occasionally milder eliminate sleep apnea.  Structured and life-long dietary and health habits are necessary to lose weight and keep healthier weight levels.     The Comprehensive Weight loss program offers  all aspects of weight loss strategies including two Non-Surgical Weight Loss Programs: Medical Weight Management and our 24 Week Healthy Lifestyle Program:  Medical Weight Management: You will meet with a Medical Weight Management Provider, as well as a Registered Dietician. The program may include medication therapy, dietary education, recommended exercise and physical therapy programs, monthly support group meetings, and possible psychological counseling. Follow up visits with the provider or dietician are scheduled based on your progress and needs.  24 Week Healthy Lifestyle Program: This unique program is designed to give you the support of weekly appointments and activities thru a 24-week period. It may include all of the components of the basic program (above), with the addition of 11 individual Health  Visits, 24-week access to the [employer] website for over 700 online classes, and monthly support group meetings. This program has an out-of-pocket expense of $499 to cover the items that can not be billed to insurance (health coaches and [employer] access), and is non-refundable/non-transferable (you may be able to use a Health Savings Account; ask your HSA provider). There may be an optional meal replacement plan prescribed as well.   Medication therapy has been approved for the treatment of sleep apnea: The FDA approved tirzepatide (ZEPBOUND) for moderate to severe sleep apnea (apnea-hypopnea index greater than or equal to 15) in patients with BMI of greater than or equal to 30, or BMI greater than or equal to 27 with at least 1 weight-related condition such as hypertension or dyslipidemia.  Surgical management achieves meaningful long-term weight loss and improvement in health risks in most patients with more severe obesity.      Sleep Apnea Surgery:    Surgery for obstructive sleep apnea is considered generally only when other therapies fail to work. Surgery may be discussed with you if you are having a  difficult time tolerating CPAP and or when there is an abnormal structure that requires surgical correction.  Nose and throat surgeries often enlarge the airway to prevent collapse.  Most of these surgeries create pain for 1-2 weeks and up to half of the most common surgeries are not effective throughout life.  You should carefully discuss the benefits and drawbacks to surgery with your sleep provider and surgeon to determine if it is the best solution for you.   More information  Surgery for CHELITA is directed at areas that are responsible for narrowing or complete obstruction of the airway during sleep.  There are a wide range of procedures available to enlarge and/or stabilize the airway to prevent blockage of breathing in the three major areas where it can occur: the palate, tongue, and nasal regions.  Successful surgical treatment depends on the accurate identification of the factors responsible for obstructive sleep apnea in each person.  A personalized approach is required because there is no single treatment that works well for everyone.  Because of anatomic variation, consultation with an examination by a sleep surgeon is a critical first step in determining what surgical options are best for each patient.  In some cases, examination during sedation may be recommended in order to guide the selection of procedures.  Patients will be counseled about risks and benefits as well as the typical recovery course after surgery. Surgery is typically not a cure for a person s CHELITA.  However, surgery will often significantly improve one s CHELITA severity (termed  success rate ).  Even in the absence of a cure, surgery will decrease the cardiovascular risk associated with OSA7; improve overall quality of life8 (sleepiness, functionality, sleep quality, etc).      Palate Procedures:  Patients with CHELITA often have narrowing of their airway in the region of their tonsils and uvula.  The goals of palate procedures are to widen the  airway in this region as well as to help the tissues resist collapse.  Modern palate procedure techniques focus on tissue conservation and soft tissue rearrangement, rather than tissue removal.  Often the uvula is preserved in this procedure. Residual sleep apnea is common in patient after pharyngoplasty with an average reduction in sleep apnea events of 33%2.      Tongue Procedures:  ExamWhile patients are awake, the muscles that surround the throat are active and keep this region open for breathing. These muscles relax during sleep, allowing the tongue and other structures to collapse and block breathing.  There are several different tongue procedures available.  Selection of a tongue base procedure depends on characteristics seen on physical exam.  Generally, procedures are aimed at removing bulky tissues in this area or preventing the back of the tongue from falling back during sleep.  Success rates for tongue surgery range from 50-62%3.    Hypoglossal Nerve Stimulation:  Hypoglossal nerve stimulation has recently received approval from the [place] Food and Drug Administration for the treatment of obstructive sleep apnea.  This is based on research showing that the system was safe and effective in treating sleep apnea6.  Results showed that the median AHI score decreased 68%, from 29.3 to 9.0. This therapy uses an implant system that senses breathing patterns and delivers mild stimulation to airway muscles, which keeps the airway open during sleep.  The system consists of three fully implanted components: a small generator (similar in size to a pacemaker), a breathing sensor, and a stimulation lead.  Using a small handheld remote, a patient turns the therapy on before bed and off upon awakening.    Candidates for this device must be greater than 18 years of age, have moderate to severe obstructive sleep apnea with less than 25% central events  (AHI between 15-65), BMI less than 35, have tried CPAP/oral  appliance for at least 8 weeks without success, and have appropriate upper airway anatomy (determined by a sleep endoscopy performed by Dr. Jason Allred or Dr. Christiano Chavira).     Nasal Procedures:  Nasal obstruction can interfere with nasal breathing during the day and night.  Studies have shown that relief of nasal obstruction can improve the ability of some patients to tolerate positive airway pressure therapy for obstructive sleep apnea1.  Treatment options include medications such as nasal saline, topical corticosteroid and antihistamine sprays, and oral medications such as antihistamines or decongestants. Non-surgical treatments can include external nasal dilators for selected patients. If these are not successful by themselves, surgery can improve the nasal airway either alone or in combination with these other options.        Combination Procedures:  Combination of surgical procedures and other treatments may be recommended, particularly if patients have more than one area of narrowing or persistent positional disease.  The success rate of combination surgery ranges from 66-80%2,3.    References  Chanda DONNELLY. The Role of the Nose in Snoring and Obstructive Sleep Apnoea: An Update.  Eur Arch Otorhinolaryngol. 2011; 268: 1365-73.   Aura SM; Ruben JA; Anderson JR; Pallanch JF; Dimitris MB; Simran SG; Lizeth ROBERTSD. Surgical modifications of the upper airway for obstructive sleep apnea in adults: a systematic review and meta-analysis. SLEEP 2010;33(10):7167-3627. Mark TOUSSAINT. Hypopharyngeal surgery in obstructive sleep apnea: an evidence-based medicine review.  Arch Otolaryngol Head Neck Surg. 2006 Feb;132(2):206-13.  Winston YH1, Dom Y, Neeraj DWIGHT. The efficacy of anatomically based multilevel surgery for obstructive sleep apnea. Otolaryngol Head Neck Surg. 2003 Oct;129(4):327-35.  Mark TOUSSAINT, Goldberg A. Hypopharyngeal Surgery in Obstructive Sleep Apnea: An Evidence-Based Medicine Review. Arch Otolaryngol Head Neck  Surg. 2006 Feb;132(2):206-13.  Nitza PJ et al. Upper-Airway Stimulation for Obstructive Sleep Apnea.  N Engl J Med. 2014 Jan 9;370(2):139-49.  Andrae Y et al. Increased Incidence of Cardiovascular Disease in Middle-aged Men with Obstructive Sleep Apnea. Am J Respir Crit Care Med; 2002 166: 159-165  Barney EM et al. Studying Life Effects and Effectiveness of Palatopharyngoplasty (SLEEP) study: Subjective Outcomes of Isolated Uvulopalatopharyngoplasty. Otolaryngol Head Neck Surg. 2011; 144: 623-631.        WHAT IF I ONLY HAVE SNORING?    Mandibular advancement devices, lateral sleep positioning, long-term weight loss and treatment of nasal allergies have been shown to improve snoring.  Exercising tongue muscles with a game (https://SocialDefender."[employer]/Yast/johana/Musicmetric-reduce-snoring/uk3060723438) or stimulating the tongue during the day with a device (https://doi.org/10.3390/hmc77445310) have improved snoring in some individuals.  https://www.CheckInOn.Me.[employer]/  https://www.sleepfoundation.org/best-anti-snoring-mouthpieces-and-mouthguards    Remember to Drive Safe... Drive Alive     Sleep health profoundly affects your health, mood, and your safety.  Thirty three percent of the population (one in three of us) is not getting enough sleep and many have a sleep disorder. Not getting enough sleep or having an untreated / undertreated sleep condition may make us sleepy without even knowing it. In fact, our driving could be dramatically impaired due to our sleep health. As your provider, here are some things I would like you to know about driving:     Here are some warning signs for impairment and dangerous drowsy driving:              -Having been awake more than 16 hours               -Looking tired               -Eyelid drooping              -Head nodding (it could be too late at this point)              -Driving for more than 30 minutes     Some things you could do to make the driving safer if you are experiencing some  drowsiness:              -Stop driving and rest              -Call for transportation              -Make sure your sleep disorder is adequately treated     Some things that have been shown NOT to work when experiencing drowsiness while driving:              -Turning on the radio              -Opening windows              -Eating any  distracting  /  entertaining  foods (e.g., sunflower seeds, candy, or any other)              -Talking on the phone      Your decision may not only impact your life, but also the life of others. Please, remember to drive safe for yourself and all of us.

## (undated) DEVICE — SU MONOCRYL 4-0 PS-2 27" UND Y426H

## (undated) DEVICE — CATH TRAY FOLEY SURESTEP 16FR W/URNE MTR STLK LATEX A303316A

## (undated) DEVICE — ESU GROUND PAD ADULT W/CORD E7507

## (undated) DEVICE — SU PDS II 0 CT-1 27" Z340H

## (undated) DEVICE — DRAPE STERI TOWEL LG 1010

## (undated) DEVICE — KIT ENDO FIRST STEP DISINFECTANT 200ML W/POUCH EP-4

## (undated) DEVICE — BNDG KLING 4" 2236

## (undated) DEVICE — DRSG GAUZE 4X4" 3033

## (undated) DEVICE — ENDO SNARE EXACTO COLD 9MM LOOP 2.4MMX230CM 00711115

## (undated) DEVICE — LINEN TOWEL PACK X30 5481

## (undated) DEVICE — DRSG KERLIX FLUFFS X5

## (undated) DEVICE — PREP CHLORAPREP 26ML TINTED HI-LITE ORANGE 930815

## (undated) DEVICE — GLOVE BIOGEL PI MICRO INDICATOR UNDERGLOVE SZ 8.0 48980

## (undated) DEVICE — CAST PADDING 4" STERILE 9044S

## (undated) DEVICE — ENDO TROCAR BLUNT TIP KII BALLOON 12X100MM C0R47

## (undated) DEVICE — SU PROLENE 4-0 PC-3 18" 8634G

## (undated) DEVICE — STPL LINEAR 90 X 3.5MM TA9035S

## (undated) DEVICE — Device

## (undated) DEVICE — SU VICRYL 4-0 PS-2 18" UND J496H

## (undated) DEVICE — SOL WATER IRRIG 1000ML BOTTLE 2F7114

## (undated) DEVICE — SUCTION MANIFOLD NEPTUNE 2 SYS 1 PORT 702-025-000

## (undated) DEVICE — LINEN TOWEL PACK X5 5464

## (undated) DEVICE — SU VICRYL 0 UR-6 27" J603H

## (undated) DEVICE — BLADE KNIFE SURG 15 371115

## (undated) DEVICE — SNARE CAPIVATOR ROUND COLD SNR BX10 M00561101

## (undated) DEVICE — ESU GROUND PAD UNIVERSAL W/O CORD

## (undated) DEVICE — PREP POVIDONE IODINE SOLUTION 10% 4OZ

## (undated) DEVICE — ENDO TROCAR SLEEVE KII Z-THREADED 05X100MM CTS02

## (undated) DEVICE — DRAPE MINI C-ARM 4003

## (undated) DEVICE — SU VICRYL 3-0 SH 27" UND J416H

## (undated) DEVICE — SPECIMEN CONTAINER 3OZ W/FORMALIN 59901

## (undated) DEVICE — SU VICRYL 3-0 PS-1 18" UND J683

## (undated) DEVICE — SOL WATER IRRIG 3000ML BAG 2B7117

## (undated) DEVICE — CAST PADDING 4" UNSTERILE 9044

## (undated) DEVICE — SOL NACL 0.9% IRRIG 1000ML BOTTLE 2F7124

## (undated) DEVICE — ENDO TRAP POLYP E-TRAP 00711099

## (undated) DEVICE — ESU LIGASURE MARYLAND LAPAROSCOPIC SLR/DVDR 5MMX37CM LF1937

## (undated) DEVICE — PIN GUARD 10-1-001 101001PBX

## (undated) DEVICE — STPL LINEAR CUT 75MM TLC75

## (undated) DEVICE — DRAPE SHEET REV FOLD 3/4 9349

## (undated) DEVICE — BLADE SAW OSCILLATING STRYK MED 9.0X25X0.38MM 2296-003-111

## (undated) DEVICE — SUCTION MANIFOLD NEPTUNE 2 SYS 4 PORT 0702-020-000

## (undated) DEVICE — GLOVE BIOGEL PI MICRO SZ 8.0 48580

## (undated) DEVICE — GOWN IMPERVIOUS 2XL BLUE

## (undated) DEVICE — TUBING SUCTION 12"X1/4" N612

## (undated) DEVICE — LINEN TOWEL PACK X6 WHITE 5487

## (undated) DEVICE — SYSTEM LAPAROVUE VISIBILITY LAPVUE10

## (undated) DEVICE — TUBING SMOKE EVAC PNEUMOCLEAR HIGH FLOW 0620050250

## (undated) DEVICE — ADH SKIN CLOSURE PREMIERPRO EXOFIN 1.0ML 3470

## (undated) DEVICE — WIPES FOLEY CARE SURESTEP PROVON DFC100

## (undated) DEVICE — BNDG ROLLER GAUZE CONFORM 3"X4YD 41-53

## (undated) DEVICE — SOL WATER IRRIG 500ML BOTTLE 2F7113

## (undated) DEVICE — ENDO TROCAR FIRST ENTRY KII FIOS Z-THRD 05X100MM CTF03

## (undated) DEVICE — PROTECTOR ARM ONE-STEP TRENDELENBURG 40418

## (undated) DEVICE — DECANTER BAG 2002S

## (undated) DEVICE — KIT PATIENT POSITIONING PIGAZZI LATEX FREE 40580

## (undated) DEVICE — ENDO FORCEP BX CAPTURA PRO SPIKE G50696

## (undated) DEVICE — DRAPE LEGGINGS CLEAR 8430

## (undated) DEVICE — DRAPE IOBAN INCISE 23X17" 6650EZ

## (undated) DEVICE — SUCTION IRR STRYKERFLOW II W/TIP 250-070-520

## (undated) DEVICE — SU PDS II 3-0 SH 27" Z316H

## (undated) DEVICE — KIT ENDO TURNOVER/PROCEDURE CARRY-ON 101822

## (undated) DEVICE — STPL POWERED ECHELON VASC 35MM PVE35A

## (undated) DEVICE — PACK EXTREMITY SOP15EXFSD

## (undated) RX ORDER — CEFAZOLIN SODIUM/WATER 2 G/20 ML
SYRINGE (ML) INTRAVENOUS
Status: DISPENSED
Start: 2023-03-08

## (undated) RX ORDER — LIDOCAINE HYDROCHLORIDE 20 MG/ML
INJECTION, SOLUTION INFILTRATION; PERINEURAL
Status: DISPENSED
Start: 2024-06-04

## (undated) RX ORDER — REGADENOSON 0.08 MG/ML
INJECTION, SOLUTION INTRAVENOUS
Status: DISPENSED
Start: 2024-06-03

## (undated) RX ORDER — FENTANYL CITRATE-0.9 % NACL/PF 10 MCG/ML
PLASTIC BAG, INJECTION (ML) INTRAVENOUS
Status: DISPENSED
Start: 2023-03-08

## (undated) RX ORDER — PROPOFOL 10 MG/ML
INJECTION, EMULSION INTRAVENOUS
Status: DISPENSED
Start: 2024-06-04

## (undated) RX ORDER — FENTANYL CITRATE 50 UG/ML
INJECTION, SOLUTION INTRAMUSCULAR; INTRAVENOUS
Status: DISPENSED
Start: 2023-03-08

## (undated) RX ORDER — DEXAMETHASONE SODIUM PHOSPHATE 4 MG/ML
INJECTION, SOLUTION INTRA-ARTICULAR; INTRALESIONAL; INTRAMUSCULAR; INTRAVENOUS; SOFT TISSUE
Status: DISPENSED
Start: 2024-06-04

## (undated) RX ORDER — METRONIDAZOLE 500 MG/100ML
INJECTION, SOLUTION INTRAVENOUS
Status: DISPENSED
Start: 2023-03-08

## (undated) RX ORDER — ONDANSETRON 2 MG/ML
INJECTION INTRAMUSCULAR; INTRAVENOUS
Status: DISPENSED
Start: 2023-03-08

## (undated) RX ORDER — BUPIVACAINE HYDROCHLORIDE 5 MG/ML
INJECTION, SOLUTION EPIDURAL; INTRACAUDAL
Status: DISPENSED
Start: 2024-06-04

## (undated) RX ORDER — ACETAMINOPHEN 325 MG/1
TABLET ORAL
Status: DISPENSED
Start: 2023-03-08

## (undated) RX ORDER — FENTANYL CITRATE 50 UG/ML
INJECTION, SOLUTION INTRAMUSCULAR; INTRAVENOUS
Status: DISPENSED
Start: 2024-06-04

## (undated) RX ORDER — CEFAZOLIN SODIUM/WATER 2 G/20 ML
SYRINGE (ML) INTRAVENOUS
Status: DISPENSED
Start: 2024-06-04

## (undated) RX ORDER — ENOXAPARIN SODIUM 100 MG/ML
INJECTION SUBCUTANEOUS
Status: DISPENSED
Start: 2023-03-08

## (undated) RX ORDER — FENTANYL CITRATE 50 UG/ML
INJECTION, SOLUTION INTRAMUSCULAR; INTRAVENOUS
Status: DISPENSED
Start: 2025-04-08

## (undated) RX ORDER — PROPOFOL 10 MG/ML
INJECTION, EMULSION INTRAVENOUS
Status: DISPENSED
Start: 2023-03-08

## (undated) RX ORDER — GABAPENTIN 300 MG/1
CAPSULE ORAL
Status: DISPENSED
Start: 2023-03-08

## (undated) RX ORDER — ONDANSETRON 2 MG/ML
INJECTION INTRAMUSCULAR; INTRAVENOUS
Status: DISPENSED
Start: 2024-06-04